# Patient Record
Sex: FEMALE | Race: WHITE | NOT HISPANIC OR LATINO | Employment: STUDENT | URBAN - METROPOLITAN AREA
[De-identification: names, ages, dates, MRNs, and addresses within clinical notes are randomized per-mention and may not be internally consistent; named-entity substitution may affect disease eponyms.]

---

## 2018-09-05 ENCOUNTER — APPOINTMENT (OUTPATIENT)
Dept: RADIOLOGY | Facility: CLINIC | Age: 23
End: 2018-09-05
Payer: COMMERCIAL

## 2018-09-05 ENCOUNTER — OFFICE VISIT (OUTPATIENT)
Dept: URGENT CARE | Facility: CLINIC | Age: 23
End: 2018-09-05
Payer: COMMERCIAL

## 2018-09-05 VITALS
DIASTOLIC BLOOD PRESSURE: 73 MMHG | SYSTOLIC BLOOD PRESSURE: 129 MMHG | TEMPERATURE: 98.2 F | RESPIRATION RATE: 18 BRPM | HEART RATE: 62 BPM | OXYGEN SATURATION: 100 %

## 2018-09-05 DIAGNOSIS — M79.641 RIGHT HAND PAIN: ICD-10-CM

## 2018-09-05 DIAGNOSIS — M79.641 RIGHT HAND PAIN: Primary | ICD-10-CM

## 2018-09-05 PROCEDURE — 99203 OFFICE O/P NEW LOW 30 MIN: CPT | Performed by: NURSE PRACTITIONER

## 2018-09-05 PROCEDURE — 73130 X-RAY EXAM OF HAND: CPT

## 2018-09-05 NOTE — PROGRESS NOTES
[facility] [facility]        NAME: Jackson Edgar is a 26 y o  female  : 1995    MRN: [id number]  DATE: 2018  TIME: 10:25 AM    Assessment and Plan   Right hand pain [M79 641]  1  Right hand pain  XR hand 3+ vw right         Patient Instructions   X-ray of right hand reveals No acute osseous abnormality  Follow RICE protocol the next 48-72 hours  Ibuprofen as directed  Follow up with PCP in 3-5 days  Proceed to  ER if symptoms worsen  Chief Complaint     Chief Complaint   Patient presents with    Hand Pain     right punched a wall on friday          History of Present Illness       Hand Pain    Incident onset: 5 days ago/ The injury mechanism was a direct blow (punched a wall  )  The pain is present in the right hand  The pain is at a severity of 3/10  She has tried NSAIDs for the symptoms  The treatment provided mild relief  Review of Systems   Review of Systems   Constitutional: Negative  HENT: Negative  Eyes: Negative  Respiratory: Negative  Cardiovascular: Negative  Gastrointestinal: Negative  Genitourinary: Negative  Musculoskeletal: Positive for arthralgias (right hand pain)  Neurological: Negative  Hematological: Negative  Psychiatric/Behavioral: Negative  Current Medications     No current outpatient prescriptions on file  Current Allergies     Allergies as of 2018    (No Known Allergies)            The following portions of the patient's history were reviewed and updated as appropriate: allergies, current medications, past family history, past medical history, past social history, past surgical history and problem list      No past medical history on file  No past surgical history on file  No family history on file  Medications have been verified          Objective   /73   Pulse 62   Temp 98 2 °F (36 8 °C)   Resp 18   SpO2 100%        Physical Exam     Physical Exam   Constitutional: She is oriented to person, place, and time  She appears well-developed and well-nourished  No distress  HENT:   Head: Normocephalic and atraumatic  Right Ear: External ear normal    Left Ear: External ear normal    Nose: Nose normal    Mouth/Throat: Oropharynx is clear and moist    Eyes: Conjunctivae and EOM are normal  Pupils are equal, round, and reactive to light  Neck: Normal range of motion  Neck supple  Cardiovascular: Normal rate, regular rhythm and normal heart sounds  Pulmonary/Chest: Effort normal and breath sounds normal    Abdominal: Soft  Bowel sounds are normal    Musculoskeletal:        Right hand: She exhibits tenderness (over 4th and 5th MCP's) and bony tenderness (4th and 5th meatatarsals  )  Neurological: She is alert and oriented to person, place, and time  She has normal reflexes  Skin: Skin is warm and dry  She is not diaphoretic  Psychiatric: She has a normal mood and affect  Her behavior is normal  Judgment and thought content normal    Nursing note and vitals reviewed  Ace bandage applied to right hand

## 2018-09-05 NOTE — PATIENT INSTRUCTIONS
Arthralgia   WHAT YOU NEED TO KNOW:   Arthralgia is pain in one or more joints, with no inflammation  It may be short-term and get better within 6 to 8 weeks  Arthralgia can be an early sign of arthritis  Arthralgia may be caused by a medical condition, such as a hormone disorder or a tumor  It may also be caused by an infection or injury  DISCHARGE INSTRUCTIONS:   Medicines: The following medicines may  be ordered for you:  · Acetaminophen  decreases pain  Ask how much to take and how often to take it  Follow directions  Acetaminophen can cause liver damage if not taken correctly  · NSAIDs  decrease pain and prevent swelling  Ask your healthcare provider which medicine is right for you  Ask how much to take and when to take it  Take as directed  NSAIDs can cause stomach bleeding and kidney problems if not taken correctly  · Pain relief cream  decreases pain  Use this cream as directed  · Take your medicine as directed  Contact your healthcare provider if you think your medicine is not helping or if you have side effects  Tell him of her if you are allergic to any medicine  Keep a list of the medicines, vitamins, and herbs you take  Include the amounts, and when and why you take them  Bring the list or the pill bottles to follow-up visits  Carry your medicine list with you in case of an emergency  Follow up with your healthcare provider or specialist as directed:  Write down your questions so you remember to ask them during your visits  Self-care:   · Apply heat  to help decrease pain  Use a heating pad or heat wrap  Apply heat for 20 to 30 minutes every 2 hours for as many days as directed  · Rest  as much as possible  Avoid activities that cause joint pain  · Apply ice  to help decrease swelling and pain  Ice may also help prevent tissue damage  Use an ice pack, or put crushed ice in a plastic bag   Cover it with a towel and place it on your painful joint for 15 to 20 minutes every hour or as directed  · Support  the joint with a brace or elastic wrap as directed  · Elevate  your joint above the level of your heart as often as you can to help decrease swelling and pain  Prop your painful joint on pillows or blankets to keep it elevated comfortably  · Lose weight  if you are overweight  Extra weight can put pressure on your joints and cause more pain  Ask your healthcare provider how much you should weigh  Ask him to help you create a weight loss plan  · Exercise  regularly to help improve joint movement and to decrease pain  Ask about the best exercise plan for you  Low-impact exercises can help take the pressure off your joints  Examples are walking, swimming, and water aerobics  Physical therapy:  A physical therapist teaches you exercises to help improve movement and strength, and to decrease pain  Ask your healthcare provider if physical therapy is right for you  Contact your healthcare provider or specialist if:   · You have a fever  · You continue to have joint pain that cannot be relieved with heat, ice, or medicine  · You have pain and inflammation around your joint  · You have questions or concerns about your condition or care  Return to the emergency department if:   · You have sudden, severe pain when you move your joint  · You have a fever and shaking chills  · You cannot move your joint  · You lose feeling on the side of your body where you have the painful joint  © 2017 [employer]  Information is for End User's use only and may not be sold, redistributed or otherwise used for commercial purposes  All illustrations and images included in CareNotes® are the copyrighted property of [employer]  or [employer]  The above information is an  only  It is not intended as medical advice for individual conditions or treatments   Talk to your doctor, nurse or pharmacist before following any medical regimen to see if it is safe and effective for you

## 2020-08-17 ENCOUNTER — TRANSCRIBE ORDERS (OUTPATIENT)
Dept: ADMINISTRATIVE | Facility: HOSPITAL | Age: 25
End: 2020-08-17

## 2020-08-17 DIAGNOSIS — M25.551 PAIN IN JOINT INVOLVING RIGHT PELVIC REGION AND THIGH: Primary | ICD-10-CM

## 2020-08-18 ENCOUNTER — HOSPITAL ENCOUNTER (OUTPATIENT)
Dept: ULTRASOUND IMAGING | Facility: HOSPITAL | Age: 25
Discharge: HOME | End: 2020-08-18
Attending: SPECIALIST
Payer: COMMERCIAL

## 2020-08-18 DIAGNOSIS — M25.551 PAIN IN JOINT INVOLVING RIGHT PELVIC REGION AND THIGH: ICD-10-CM

## 2020-08-18 PROCEDURE — 76856 US EXAM PELVIC COMPLETE: CPT

## 2020-08-18 PROCEDURE — 76830 TRANSVAGINAL US NON-OB: CPT

## 2020-10-20 ENCOUNTER — TRANSCRIBE ORDERS (OUTPATIENT)
Dept: ADMINISTRATIVE | Facility: HOSPITAL | Age: 25
End: 2020-10-20

## 2020-10-20 DIAGNOSIS — M25.551 PELVIC JOINT PAIN, RIGHT: Primary | ICD-10-CM

## 2020-10-26 ENCOUNTER — HOSPITAL ENCOUNTER (OUTPATIENT)
Dept: ULTRASOUND IMAGING | Facility: HOSPITAL | Age: 25
Discharge: HOME | End: 2020-10-26
Attending: SPECIALIST
Payer: COMMERCIAL

## 2020-10-26 DIAGNOSIS — M25.551 PELVIC JOINT PAIN, RIGHT: ICD-10-CM

## 2020-10-26 PROCEDURE — 76856 US EXAM PELVIC COMPLETE: CPT

## 2020-10-26 PROCEDURE — 76830 TRANSVAGINAL US NON-OB: CPT

## 2020-11-28 ENCOUNTER — HOSPITAL ENCOUNTER (EMERGENCY)
Facility: HOSPITAL | Age: 25
Discharge: HOME | End: 2020-11-28
Attending: INTERNAL MEDICINE | Admitting: INTERNAL MEDICINE
Payer: COMMERCIAL

## 2020-11-28 VITALS
WEIGHT: 130 LBS | DIASTOLIC BLOOD PRESSURE: 89 MMHG | TEMPERATURE: 98.8 F | SYSTOLIC BLOOD PRESSURE: 132 MMHG | OXYGEN SATURATION: 100 % | HEART RATE: 72 BPM | RESPIRATION RATE: 18 BRPM

## 2020-11-28 DIAGNOSIS — T83.32XA INTRAUTERINE CONTRACEPTIVE DEVICE THREADS LOST, INITIAL ENCOUNTER: Primary | ICD-10-CM

## 2020-11-28 LAB
EXT PREG TEST URINE: NEGATIVE
EXT. CONTROL ED NAV: NORMAL

## 2020-11-28 PROCEDURE — 99283 EMERGENCY DEPT VISIT LOW MDM: CPT

## 2020-11-28 PROCEDURE — 99282 EMERGENCY DEPT VISIT SF MDM: CPT | Performed by: PHYSICIAN ASSISTANT

## 2020-11-28 PROCEDURE — 81025 URINE PREGNANCY TEST: CPT | Performed by: PHYSICIAN ASSISTANT

## 2021-04-21 DIAGNOSIS — J02.9 SORE THROAT: Primary | ICD-10-CM

## 2021-04-21 DIAGNOSIS — R53.83 FATIGUE, UNSPECIFIED TYPE: ICD-10-CM

## 2021-04-21 DIAGNOSIS — R51.9 ACUTE NONINTRACTABLE HEADACHE, UNSPECIFIED HEADACHE TYPE: ICD-10-CM

## 2021-04-21 PROCEDURE — U0005 INFEC AGEN DETEC AMPLI PROBE: HCPCS | Performed by: PHYSICIAN ASSISTANT

## 2021-04-21 PROCEDURE — U0003 INFECTIOUS AGENT DETECTION BY NUCLEIC ACID (DNA OR RNA); SEVERE ACUTE RESPIRATORY SYNDROME CORONAVIRUS 2 (SARS-COV-2) (CORONAVIRUS DISEASE [COVID-19]), AMPLIFIED PROBE TECHNIQUE, MAKING USE OF HIGH THROUGHPUT TECHNOLOGIES AS DESCRIBED BY CMS-2020-01-R: HCPCS | Performed by: PHYSICIAN ASSISTANT

## 2021-04-22 LAB — SARS-COV-2 RNA RESP QL NAA+PROBE: NEGATIVE

## 2021-06-11 ENCOUNTER — OFFICE VISIT (OUTPATIENT)
Dept: URGENT CARE | Facility: CLINIC | Age: 26
End: 2021-06-11
Payer: COMMERCIAL

## 2021-06-11 VITALS
OXYGEN SATURATION: 100 % | HEIGHT: 64 IN | BODY MASS INDEX: 22.2 KG/M2 | HEART RATE: 60 BPM | RESPIRATION RATE: 18 BRPM | WEIGHT: 130 LBS | TEMPERATURE: 97.8 F

## 2021-06-11 DIAGNOSIS — H10.31 ACUTE CONJUNCTIVITIS OF RIGHT EYE, UNSPECIFIED ACUTE CONJUNCTIVITIS TYPE: Primary | ICD-10-CM

## 2021-06-11 PROCEDURE — 99213 OFFICE O/P EST LOW 20 MIN: CPT | Performed by: PHYSICIAN ASSISTANT

## 2021-06-11 RX ORDER — TOBRAMYCIN 3 MG/ML
1 SOLUTION/ DROPS OPHTHALMIC
Qty: 5 ML | Refills: 0 | Status: SHIPPED | OUTPATIENT
Start: 2021-06-11 | End: 2022-01-04

## 2021-06-11 NOTE — PROGRESS NOTES
[address] [facility] [employer] [facility]- [facility] pass          NAME: Shari Toledo is a 23 y o  female  : 1995    MRN: [id number]  DATE: 2021  TIME: 9:26 AM    Assessment and Plan   Acute conjunctivitis of right eye, unspecified acute conjunctivitis type [H10 31]  1  Acute conjunctivitis of right eye, unspecified acute conjunctivitis type  tobramycin (TOBREX) 0 3 % SOLN       Patient Instructions   To take drops as prescribed  · Wash your hands with soap and water often  Wash your hands before and after you touch your eyes  Also wash your hands before you prepare or eat food and after you use the bathroom or change a diaper  · Avoid allergens  Try to avoid the things that cause your allergies, such as pets, dust, or grass  · Avoid contact with others  Do not share towels or washcloths  Try to stay away from others as much as possible  Follow up with PCP in 3-5 days  To present to the ER if symptoms worsen  Chief Complaint     Chief Complaint   Patient presents with    Conjunctivitis     right eye crusted over this morning, swelling          History of Present Illness   Shari Toledo presents to the clinic c/o    Conjunctivitis   The current episode started yesterday  The onset was sudden  The problem occurs rarely  The problem has been unchanged  The problem is mild  Nothing relieves the symptoms  Nothing aggravates the symptoms  Associated symptoms include eye itching, eye discharge, eye pain and eye redness  Pertinent negatives include no fever, no decreased vision, no double vision, no photophobia, no abdominal pain, no congestion, no ear discharge, no ear pain, no headaches, no cough, no wheezing and no rash  Review of Systems   Review of Systems   Constitutional: Negative for chills, diaphoresis, fatigue and fever  HENT: Negative for congestion, ear discharge, ear pain and facial swelling  Eyes: Positive for pain, discharge, redness and itching   Negative for double vision, photophobia and visual disturbance  Respiratory: Negative for apnea, cough, chest tightness, shortness of breath and wheezing  Cardiovascular: Negative for chest pain and palpitations  Gastrointestinal: Negative for abdominal pain  Skin: Negative for color change, rash and wound  Neurological: Negative for dizziness and headaches  Hematological: Negative for adenopathy  Current Medications     No long-term medications on file  Current Allergies     Allergies as of 06/11/2021    (No Known Allergies)            The following portions of the patient's history were reviewed and updated as appropriate: allergies, current medications, past family history, past medical history, past social history, past surgical history and problem list   History reviewed  No pertinent past medical history    Past Surgical History:   Procedure Laterality Date    SINUS SURGERY      WISDOM TOOTH EXTRACTION       Social History     Socioeconomic History    Marital status: Single     Spouse name: Not on file    Number of children: Not on file    Years of education: Not on file    Highest education level: Not on file   Occupational History    Not on file   Social Needs    Financial resource strain: Not on file    Food insecurity     Worry: Not on file     Inability: Not on file    Transportation needs     Medical: Not on file     Non-medical: Not on file   Tobacco Use    Smoking status: Never Smoker    Smokeless tobacco: Never Used   Substance and Sexual Activity    Alcohol use: Yes     Frequency: 2-4 times a month    Drug use: Not Currently    Sexual activity: Not on file   Lifestyle    Physical activity     Days per week: Not on file     Minutes per session: Not on file    Stress: Not on file   Relationships    Social connections     Talks on phone: Not on file     Gets together: Not on file     Attends Hoahaoism service: Not on file     Active member of club or organization: Not on file Attends meetings of clubs or organizations: Not on file     Relationship status: Not on file    Intimate partner violence     Fear of current or ex partner: Not on file     Emotionally abused: Not on file     Physically abused: Not on file     Forced sexual activity: Not on file   Other Topics Concern    Not on file   Social History Narrative    Not on file       Objective   Pulse 60   Temp 97 8 °F (36 6 °C) (Temporal)   Resp 18   Ht 5' 4" (1 626 m)   Wt 59 kg (130 lb)   LMP 06/03/2021 (Exact Date)   SpO2 100%   BMI 22 31 kg/m²      Physical Exam     Physical Exam  Vitals signs and nursing note reviewed  Constitutional:       General: She is not in acute distress  Appearance: She is well-developed  She is not diaphoretic  HENT:      Head: Normocephalic and atraumatic  Right Ear: Tympanic membrane and external ear normal       Left Ear: Tympanic membrane and external ear normal       Nose: Nose normal    Eyes:      General: Lids are normal  Lids are everted, no foreign bodies appreciated  Vision grossly intact  No scleral icterus  Right eye: Discharge present  Left eye: No discharge  Extraocular Movements: Extraocular movements intact  Conjunctiva/sclera:      Right eye: Right conjunctiva is injected  Pupils: Pupils are equal, round, and reactive to light  Right eye: No corneal abrasion or fluorescein uptake  Left eye: No corneal abrasion or fluorescein uptake  Cardiovascular:      Rate and Rhythm: Normal rate and regular rhythm  Heart sounds: Normal heart sounds  No murmur  No friction rub  No gallop  Pulmonary:      Effort: Pulmonary effort is normal  No respiratory distress  Breath sounds: Normal breath sounds  No decreased breath sounds, wheezing, rhonchi or rales  Skin:     General: Skin is warm and dry  Coloration: Skin is not pale  Findings: No erythema or rash     Neurological:      Mental Status: She is alert and oriented to person, place, and time  Psychiatric:         Behavior: Behavior normal          Thought Content:  Thought content normal          Judgment: Judgment normal          Lilliam Villasenor PA-C

## 2021-06-11 NOTE — LETTER
June 11, 2021     Patient: Porsha Vick   YOB: 1995   Date of Visit: 6/11/2021       To Whom it May Concern: Johnnysadaf Parada is under my professional care  She was seen in my office on 6/11/2021  She may return to work on 06/12/2021  If you have any questions or concerns, please don't hesitate to call           Sincerely,          TAMMY DAY        CC: No Recipients

## 2021-06-18 ENCOUNTER — OFFICE VISIT (OUTPATIENT)
Dept: URGENT CARE | Facility: CLINIC | Age: 26
End: 2021-06-18
Payer: COMMERCIAL

## 2021-06-18 ENCOUNTER — HOSPITAL ENCOUNTER (EMERGENCY)
Facility: HOSPITAL | Age: 26
Discharge: HOME | End: 2021-06-18
Attending: INTERNAL MEDICINE
Payer: COMMERCIAL

## 2021-06-18 VITALS
SYSTOLIC BLOOD PRESSURE: 128 MMHG | RESPIRATION RATE: 16 BRPM | HEIGHT: 64 IN | OXYGEN SATURATION: 99 % | WEIGHT: 130 LBS | DIASTOLIC BLOOD PRESSURE: 87 MMHG | BODY MASS INDEX: 22.2 KG/M2 | HEART RATE: 58 BPM

## 2021-06-18 VITALS
TEMPERATURE: 99.1 F | DIASTOLIC BLOOD PRESSURE: 70 MMHG | OXYGEN SATURATION: 99 % | SYSTOLIC BLOOD PRESSURE: 113 MMHG | HEART RATE: 71 BPM | RESPIRATION RATE: 18 BRPM

## 2021-06-18 DIAGNOSIS — R53.83 OTHER FATIGUE: ICD-10-CM

## 2021-06-18 DIAGNOSIS — G43.909 MIGRAINE WITHOUT STATUS MIGRAINOSUS, NOT INTRACTABLE, UNSPECIFIED MIGRAINE TYPE: Primary | ICD-10-CM

## 2021-06-18 DIAGNOSIS — R51.9 ACUTE INTRACTABLE HEADACHE, UNSPECIFIED HEADACHE TYPE: Primary | ICD-10-CM

## 2021-06-18 LAB
ALBUMIN SERPL BCP-MCNC: 4.3 G/DL (ref 3.5–5.7)
ALP SERPL-CCNC: 49 U/L (ref 40–150)
ALT SERPL W P-5'-P-CCNC: 10 U/L (ref 7–52)
ANION GAP SERPL CALCULATED.3IONS-SCNC: 7 MMOL/L (ref 4–13)
APTT PPP: 29 SECONDS (ref 23–37)
AST SERPL W P-5'-P-CCNC: 13 U/L (ref 13–39)
BASOPHILS # BLD AUTO: 0 THOUSANDS/ΜL (ref 0–0.1)
BASOPHILS NFR BLD AUTO: 1 % (ref 0–2)
BILIRUB SERPL-MCNC: 0.4 MG/DL (ref 0.2–1)
BUN SERPL-MCNC: 12 MG/DL (ref 7–25)
CALCIUM SERPL-MCNC: 8.7 MG/DL (ref 8.6–10.5)
CHLORIDE SERPL-SCNC: 104 MMOL/L (ref 98–107)
CO2 SERPL-SCNC: 28 MMOL/L (ref 21–31)
CREAT SERPL-MCNC: 0.94 MG/DL (ref 0.6–1.2)
EOSINOPHIL # BLD AUTO: 0.1 THOUSAND/ΜL (ref 0–0.61)
EOSINOPHIL NFR BLD AUTO: 2 % (ref 0–5)
ERYTHROCYTE [DISTWIDTH] IN BLOOD BY AUTOMATED COUNT: 12.3 % (ref 11.5–14.5)
GFR SERPL CREATININE-BSD FRML MDRD: 85 ML/MIN/1.73SQ M
GLUCOSE SERPL-MCNC: 99 MG/DL (ref 65–99)
HCT VFR BLD AUTO: 38.6 % (ref 42–47)
HGB BLD-MCNC: 13 G/DL (ref 12–16)
INR PPP: 1.1 (ref 0.84–1.19)
LYMPHOCYTES # BLD AUTO: 2.1 THOUSANDS/ΜL (ref 0.6–4.47)
LYMPHOCYTES NFR BLD AUTO: 40 % (ref 21–51)
MCH RBC QN AUTO: 30.2 PG (ref 26–34)
MCHC RBC AUTO-ENTMCNC: 33.6 G/DL (ref 31–37)
MCV RBC AUTO: 90 FL (ref 81–99)
MONOCYTES # BLD AUTO: 0.4 THOUSAND/ΜL (ref 0.17–1.22)
MONOCYTES NFR BLD AUTO: 7 % (ref 2–12)
NEUTROPHILS # BLD AUTO: 2.6 THOUSANDS/ΜL (ref 1.4–6.5)
NEUTS SEG NFR BLD AUTO: 50 % (ref 42–75)
PLATELET # BLD AUTO: 253 THOUSANDS/UL (ref 149–390)
PMV BLD AUTO: 7.7 FL (ref 8.6–11.7)
POTASSIUM SERPL-SCNC: 4.1 MMOL/L (ref 3.5–5.5)
PROT SERPL-MCNC: 6.6 G/DL (ref 6.4–8.9)
PROTHROMBIN TIME: 14.1 SECONDS (ref 11.6–14.5)
RBC # BLD AUTO: 4.3 MILLION/UL (ref 3.9–5.2)
SODIUM SERPL-SCNC: 139 MMOL/L (ref 134–143)
WBC # BLD AUTO: 5.2 THOUSAND/UL (ref 4.8–10.8)

## 2021-06-18 PROCEDURE — 96375 TX/PRO/DX INJ NEW DRUG ADDON: CPT

## 2021-06-18 PROCEDURE — 85025 COMPLETE CBC W/AUTO DIFF WBC: CPT | Performed by: INTERNAL MEDICINE

## 2021-06-18 PROCEDURE — 96376 TX/PRO/DX INJ SAME DRUG ADON: CPT

## 2021-06-18 PROCEDURE — 96374 THER/PROPH/DIAG INJ IV PUSH: CPT

## 2021-06-18 PROCEDURE — 80053 COMPREHEN METABOLIC PANEL: CPT | Performed by: INTERNAL MEDICINE

## 2021-06-18 PROCEDURE — 99283 EMERGENCY DEPT VISIT LOW MDM: CPT

## 2021-06-18 PROCEDURE — 36415 COLL VENOUS BLD VENIPUNCTURE: CPT | Performed by: INTERNAL MEDICINE

## 2021-06-18 PROCEDURE — 85730 THROMBOPLASTIN TIME PARTIAL: CPT | Performed by: INTERNAL MEDICINE

## 2021-06-18 PROCEDURE — 96361 HYDRATE IV INFUSION ADD-ON: CPT

## 2021-06-18 PROCEDURE — 99282 EMERGENCY DEPT VISIT SF MDM: CPT | Performed by: INTERNAL MEDICINE

## 2021-06-18 PROCEDURE — 99215 OFFICE O/P EST HI 40 MIN: CPT | Performed by: NURSE PRACTITIONER

## 2021-06-18 PROCEDURE — 85610 PROTHROMBIN TIME: CPT | Performed by: INTERNAL MEDICINE

## 2021-06-18 RX ORDER — KETOROLAC TROMETHAMINE 30 MG/ML
15 INJECTION, SOLUTION INTRAMUSCULAR; INTRAVENOUS ONCE
Status: COMPLETED | OUTPATIENT
Start: 2021-06-18 | End: 2021-06-18

## 2021-06-18 RX ORDER — SODIUM CHLORIDE 9 MG/ML
1000 INJECTION, SOLUTION INTRAVENOUS CONTINUOUS
Status: DISCONTINUED | OUTPATIENT
Start: 2021-06-18 | End: 2021-06-18 | Stop reason: HOSPADM

## 2021-06-18 RX ORDER — METOCLOPRAMIDE HYDROCHLORIDE 5 MG/ML
10 INJECTION INTRAMUSCULAR; INTRAVENOUS ONCE
Status: COMPLETED | OUTPATIENT
Start: 2021-06-18 | End: 2021-06-18

## 2021-06-18 RX ORDER — DIPHENHYDRAMINE HYDROCHLORIDE 50 MG/ML
25 INJECTION INTRAMUSCULAR; INTRAVENOUS ONCE
Status: COMPLETED | OUTPATIENT
Start: 2021-06-18 | End: 2021-06-18

## 2021-06-18 RX ADMIN — DIPHENHYDRAMINE HYDROCHLORIDE 25 MG: 50 INJECTION INTRAMUSCULAR; INTRAVENOUS at 15:44

## 2021-06-18 RX ADMIN — METOCLOPRAMIDE 10 MG: 5 INJECTION, SOLUTION INTRAMUSCULAR; INTRAVENOUS at 15:45

## 2021-06-18 RX ADMIN — KETOROLAC TROMETHAMINE 15 MG: 30 INJECTION, SOLUTION INTRAMUSCULAR at 16:48

## 2021-06-18 RX ADMIN — KETOROLAC TROMETHAMINE 15 MG: 30 INJECTION, SOLUTION INTRAMUSCULAR at 15:45

## 2021-06-18 RX ADMIN — SODIUM CHLORIDE 1000 ML/HR: 0.9 INJECTION, SOLUTION INTRAVENOUS at 15:48

## 2021-06-18 NOTE — PATIENT INSTRUCTIONS
It is recommended that you go to the ED for evaluation of intractable headache  You have a ride to the ED   Follow up with your PCP after ED visit

## 2021-06-18 NOTE — ED PROVIDER NOTES
History  Chief Complaint   Patient presents with    Migraine     for several days unrelieved by medications  sent to ED from the care now     43-year-old female accompanied by boyfriend presents emergency room with 2-3 day history of headache  Patient states she has a history of migraines x2 her last 1 being about 5 years ago in [place] where she was hospitalized for the migraine  Patient states about a week ago she developed upper respiratory symptoms pinkeye which seemed to resolve however as of Wednesday she started getting headache which appeared like her previous migraines for sensitive nausea no vomiting     She denies any trauma loss of consciousness, facial droop slurred speech ambulatory difficulty hemiparesis  She denies fever cough  The patient is sitting in a dark room with sunglasses on  Patient states she has taken ibuprofen as well as Tylenol without any relief  Patient's last menstrual cycle ended on June 13th 2021 and it was a normal cycle  Prior to Admission Medications   Prescriptions Last Dose Informant Patient Reported? Taking?   tobramycin (TOBREX) 0 3 % SOLN   No No   Sig: Administer 1 drop to the right eye every 4 (four) hours while awake      Facility-Administered Medications: None       History reviewed  No pertinent past medical history  Past Surgical History:   Procedure Laterality Date    SINUS SURGERY      WISDOM TOOTH EXTRACTION         History reviewed  No pertinent family history  I have reviewed and agree with the history as documented  E-Cigarette/Vaping    E-Cigarette Use Never User      E-Cigarette/Vaping Substances     Social History     Tobacco Use    Smoking status: Never Smoker    Smokeless tobacco: Never Used   Vaping Use    Vaping Use: Never used   Substance Use Topics    Alcohol use: Yes    Drug use: Not Currently       Review of Systems   Constitutional: Positive for fatigue  HENT: Negative      Eyes:        Patient has sensitivity to light   Respiratory: Negative  Cardiovascular: Negative  Gastrointestinal: Negative  Genitourinary: Negative  Skin: Negative  Neurological: Negative  Hematological: Negative  Psychiatric/Behavioral: Negative  Physical Exam  Physical Exam  Vitals and nursing note reviewed  Exam conducted with a chaperone present  Constitutional:       General: She is not in acute distress  Appearance: Normal appearance  She is normal weight  She is not ill-appearing, toxic-appearing or diaphoretic  HENT:      Head: Normocephalic and atraumatic  Right Ear: Tympanic membrane normal       Left Ear: Tympanic membrane normal       Nose: Nose normal    Eyes:      Extraocular Movements: Extraocular movements intact  Conjunctiva/sclera: Conjunctivae normal       Pupils: Pupils are equal, round, and reactive to light  Cardiovascular:      Rate and Rhythm: Normal rate and regular rhythm  Pulmonary:      Effort: Pulmonary effort is normal       Breath sounds: Normal breath sounds  Abdominal:      General: Abdomen is flat  Palpations: Abdomen is soft  Musculoskeletal:         General: Normal range of motion  Cervical back: Normal range of motion and neck supple  Skin:     General: Skin is warm and dry  Neurological:      General: No focal deficit present  Mental Status: She is alert and oriented to person, place, and time  Mental status is at baseline  Comments: Cranial nerves 2-12 were grossly intact  Upper extremity examination normal mass tone sensation DTRs are equal and symmetric  Lower extremity examination normal mass tone sensation DTRs are equal and symmetric  Psychiatric:         Mood and Affect: Mood normal          Behavior: Behavior normal          Thought Content:  Thought content normal          Judgment: Judgment normal          Vital Signs  ED Triage Vitals [06/18/21 1506]   Temp Pulse Respirations Blood Pressure SpO2   -- (!) 48 17 138/80 99 %      Temp src Heart Rate Source Patient Position - Orthostatic VS BP Location FiO2 (%)   -- Monitor Sitting Left arm --      Pain Score       8           Vitals:    06/18/21 1506   BP: 138/80   Pulse: (!) 48   Patient Position - Orthostatic VS: Sitting         Visual Acuity      ED Medications  Medications   sodium chloride 0 9 % infusion (1,000 mL/hr Intravenous New Bag 6/18/21 1548)   diphenhydrAMINE (BENADRYL) injection 25 mg (25 mg Intravenous Given 6/18/21 1544)   metoclopramide (REGLAN) injection 10 mg (10 mg Intravenous Given 6/18/21 1545)   ketorolac (TORADOL) injection 15 mg (15 mg Intravenous Given 6/18/21 1545)   ketorolac (TORADOL) injection 15 mg (15 mg Intravenous Given 6/18/21 1648)       Diagnostic Studies  Results Reviewed     Procedure Component Value Units Date/Time    Protime-INR [[id number]]  (Normal) Collected: 06/18/21 1601    Lab Status: Final result Specimen: Blood from Arm, Left Updated: 06/18/21 1617     Protime 14 1 seconds      INR 1 10    APTT [[id number]]  (Normal) Collected: 06/18/21 1601    Lab Status: Final result Specimen: Blood from Arm, Left Updated: 06/18/21 1617     PTT 29 seconds     Comprehensive metabolic panel [[id number]] Collected: 06/18/21 1536    Lab Status: Final result Specimen: Blood from Arm, Left Updated: 06/18/21 1600     Sodium 139 mmol/L      Potassium 4 1 mmol/L      Chloride 104 mmol/L      CO2 28 mmol/L      ANION GAP 7 mmol/L      BUN 12 mg/dL      Creatinine 0 94 mg/dL      Glucose 99 mg/dL      Calcium 8 7 mg/dL      AST 13 U/L      ALT 10 U/L      Alkaline Phosphatase 49 U/L      Total Protein 6 6 g/dL      Albumin 4 3 g/dL      Total Bilirubin 0 40 mg/dL      eGFR 85 ml/min/1 73sq m     Narrative:      Urvashi guidelines for Chronic Kidney Disease (CKD):     Stage 1 with normal or high GFR (GFR > 90 mL/min/1 73 square meters)    Stage 2 Mild CKD (GFR = 60-89 mL/min/1 73 square meters)    Stage 3A Moderate CKD (GFR = 45-59 mL/min/1 73 square meters)    Stage 3B Moderate CKD (GFR = 30-44 mL/min/1 73 square meters)    Stage 4 Severe CKD (GFR = 15-29 mL/min/1 73 square meters)    Stage 5 End Stage CKD (GFR <15 mL/min/1 73 square meters)  Note: GFR calculation is accurate only with a steady state creatinine    CBC and differential [[id number]]  (Abnormal) Collected: 06/18/21 1536    Lab Status: Final result Specimen: Blood from Arm, Left Updated: 06/18/21 1544     WBC 5 20 Thousand/uL      RBC 4 30 Million/uL      Hemoglobin 13 0 g/dL      Hematocrit 38 6 %      MCV 90 fL      MCH 30 2 pg      MCHC 33 6 g/dL      RDW 12 3 %      MPV 7 7 fL      Platelets 112 Thousands/uL      Neutrophils Relative 50 %      Lymphocytes Relative 40 %      Monocytes Relative 7 %      Eosinophils Relative 2 %      Basophils Relative 1 %      Neutrophils Absolute 2 60 Thousands/µL      Lymphocytes Absolute 2 10 Thousands/µL      Monocytes Absolute 0 40 Thousand/µL      Eosinophils Absolute 0 10 Thousand/µL      Basophils Absolute 0 00 Thousands/µL     POCT pregnancy, urine [[id number]]     Lab Status: No result Specimen: Urine                  No orders to display              Procedures  Procedures         ED Course                                           MDM  Number of Diagnoses or Management Options  Diagnosis management comments: Patient states her headache is about 70-80% improved at this time and feels well enough to go home  Recommend following up with her gyn or PCP  If the headaches resume her continue or become more recurrence recommend possible Neurology follow-up  This is her 1st migraine in nearly 5 years        Disposition  Final diagnoses:   Migraine without status migrainosus, not intractable, unspecified migraine type     Time reflects when diagnosis was documented in both MDM as applicable and the Disposition within this note     Time User Action Codes Description Comment    6/18/2021  5:01 PM Lui Lao [Z21 171] Migraine without status migrainosus, not intractable, unspecified migraine type       ED Disposition     ED Disposition Condition Date/Time Comment    Discharge Stable Fri Jun 18, 2021  5:01 PM Carmen Reid discharge to home/self care  Follow-up Information    None         Patient's Medications   Discharge Prescriptions    No medications on file     No discharge procedures on file      PDMP Review     None          ED Provider  Electronically Signed by           Solomon Stapleton MD  06/18/21 7692

## 2021-06-18 NOTE — DISCHARGE INSTRUCTIONS
Follow-up with her PCP or gyn  Headaches become more prevalent or recurrent the patient should probably be seen by Neurology

## 2021-06-18 NOTE — PROGRESS NOTES
[address]        NAME: Nette Ponce is a 23 y o  female  : 1995    MRN: [id number]  DATE: 2021  TIME: 2:56 PM    Assessment and Plan   Acute intractable headache, unspecified headache type [R51 9]  1  Acute intractable headache, unspecified headache type  Transfer to other facility   2  Other fatigue  Transfer to other facility         Patient Instructions       Follow up with PCP in 3-5 days  Proceed to  ER if symptoms worsen  It is recommended that you go to the ED for evaluation of intractable headache  You have a ride to the ED   Follow up with your PCP after ED visit  Chief Complaint     Chief Complaint   Patient presents with    Migraine     Pt c/o a migraine for three days  History of Present Illness       This is a 23year old female who states hasn't been feeling well x 1 week  She states she was seen here 1 week ago with pink eye and treated  She states over the last 3 days she has had a migraine and it is in both temples and feels like a knife going through it  She states that she has taken ibuprofen and tylenol w/o relief  She states that she is nauseated but no vomiting  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  She denies fevers  She states she had covid in 2020  She did not drive here  Migraine   Associated symptoms include nausea  Review of Systems   Review of Systems   Constitutional: Positive for fatigue  Eyes: Negative  Respiratory: Negative  Cardiovascular: Negative  Gastrointestinal: Positive for nausea  Endocrine: Negative  Genitourinary: Negative  Musculoskeletal: Negative  Skin: Negative  Allergic/Immunologic: Negative  Neurological: Positive for headaches  Hematological: Negative  Psychiatric/Behavioral: Negative            Current Medications       Current Outpatient Medications:     tobramycin (TOBREX) 0 3 % SOLN, Administer 1 drop to the right eye every 4 (four) hours while awake, Disp: 5 mL, Rfl: 0    Current Allergies     Allergies as of 06/18/2021    (No Known Allergies)            The following portions of the patient's history were reviewed and updated as appropriate: allergies, current medications, past family history, past medical history, past social history, past surgical history and problem list      History reviewed  No pertinent past medical history  Past Surgical History:   Procedure Laterality Date    SINUS SURGERY      WISDOM TOOTH EXTRACTION         History reviewed  No pertinent family history  Medications have been verified  Objective   /70   Pulse 71   Temp 99 1 °F (37 3 °C)   Resp 18   LMP 06/03/2021 (Exact Date)   SpO2 99%   Patient's last menstrual period was 06/03/2021 (exact date)  Physical Exam     Physical Exam  Vitals and nursing note reviewed  Constitutional:       General: She is not in acute distress  Appearance: Normal appearance  She is normal weight  She is ill-appearing  She is not toxic-appearing or diaphoretic  Comments: Dressed in long sleeve shirt, has a ball cap on, alves over the head with her glasses and sun glasses on , appears to not feel well, slow moving  Not toxic appearing  VSS    HENT:      Head: Normocephalic and atraumatic  Right Ear: Tympanic membrane and ear canal normal       Left Ear: Tympanic membrane and ear canal normal       Nose: Nose normal       Mouth/Throat:      Mouth: Mucous membranes are moist    Eyes:      General: No scleral icterus  Right eye: No discharge  Left eye: No discharge  Extraocular Movements: Extraocular movements intact  Conjunctiva/sclera: Conjunctivae normal       Pupils: Pupils are equal, round, and reactive to light  Comments: Left ptosis    Cardiovascular:      Rate and Rhythm: Normal rate and regular rhythm  Pulses: Normal pulses  Heart sounds: Normal heart sounds     Pulmonary: Effort: Pulmonary effort is normal       Breath sounds: Normal breath sounds  Abdominal:      General: Abdomen is flat  Palpations: Abdomen is soft  Musculoskeletal:         General: Normal range of motion  Cervical back: Normal range of motion and neck supple  Skin:     General: Skin is warm and dry  Capillary Refill: Capillary refill takes less than 2 seconds  Neurological:      General: No focal deficit present  Mental Status: She is alert and oriented to person, place, and time  Psychiatric:         Mood and Affect: Mood normal          Behavior: Behavior normal          Thought Content: Thought content normal          Judgment: Judgment normal            Sent to ED for further evaluation - may benefit from migraine cocktail, CT head due to pain and recent pink eye to rule out sinusitis or abnormal CT  Concern of ? Ptosis    - pt denies knowledge of normalcy

## 2022-01-03 ENCOUNTER — OFFICE VISIT (OUTPATIENT)
Dept: URGENT CARE | Facility: CLINIC | Age: 27
End: 2022-01-03
Payer: COMMERCIAL

## 2022-01-03 VITALS
OXYGEN SATURATION: 100 % | HEART RATE: 52 BPM | WEIGHT: 130 LBS | RESPIRATION RATE: 14 BRPM | DIASTOLIC BLOOD PRESSURE: 70 MMHG | SYSTOLIC BLOOD PRESSURE: 140 MMHG | TEMPERATURE: 97.4 F | BODY MASS INDEX: 22.31 KG/M2

## 2022-01-03 DIAGNOSIS — R53.83 OTHER FATIGUE: Primary | ICD-10-CM

## 2022-01-03 PROCEDURE — 99213 OFFICE O/P EST LOW 20 MIN: CPT

## 2022-01-03 NOTE — PATIENT INSTRUCTIONS
Add magnesium po daily  Schedule appointment with PCP for follow up and routine blood work  Drink plenty of fluids and rest       Fatigue, Ambulatory Care   GENERAL INFORMATION:   Fatigue  is mental and physical exhaustion that does not get better with rest  It may interfere with your daily activities and can cause extreme sleepiness  Although it is normal to feel tired sometimes, long-term fatigue may be a sign of serious illness  Seek immediate care for the following symptoms:   · Difficulty breathing    · Chest pain  Management and treatment for fatigue includes the following:   · Keep a fatigue diary  to help you find out what makes you feel more or less tired  · Exercise as directed  Ask your healthcare provider about the best exercise plan for you  Even moderate exercise may decrease your fatigue  · Keep a regular schedule  Go to bed at the same time every night and limit naps  · Eat healthy foods  including fruits, vegetables, whole-grain breads, low-fat dairy products, beans, lean meats, and fish  Ask if you need to be on a special diet  · Limit caffeine and alcohol  because they can interfere with your sleep  Women should limit alcohol to 1 drink a day  Men should limit alcohol to 2 drinks a day  A drink of alcohol is 12 ounces of beer, 5 ounces of wine, or 1½ ounces of liquor  Ask your healthcare provider how much caffeine is safe for you  · Do not smoke  Smoking can cause health problems that make you tired  If you smoke, it is never too late to quit  Ask your healthcare provider for information if you need help quitting  Follow up with your healthcare provider as directed:  Write down your questions so you remember to ask them during your visits  CARE AGREEMENT:   You have the right to help plan your care  Learn about your health condition and how it may be treated  Discuss treatment options with your caregivers to decide what care you want to receive   You always have the right to refuse treatment  The above information is an  only  It is not intended as medical advice for individual conditions or treatments  Talk to your doctor, nurse or pharmacist before following any medical regimen to see if it is safe and effective for you  © 2014 [address] is for End User's use only and may not be sold, redistributed or otherwise used for commercial purposes  All illustrations and images included in CareNotes® are the copyrighted property of A D [employer] M [employer]  or [employer]

## 2022-01-03 NOTE — LETTER
January 3, 2022     Patient: Jasmeet Moore   YOB: 1995   Date of Visit: 1/3/2022       To Whom It May Concern: It is my medical opinion that Chad Ragsdale may return to work on 1/4/2022  If you have any questions or concerns, please don't hesitate to call           Sincerely,        TAMMY DAY    CC: No Recipients

## 2022-01-03 NOTE — PROGRESS NOTES
[address]        NAME: Xander Francisco is a 23 y o  female  : 1995    MRN: [id number]  DATE: January 3, 2022  TIME: 3:10 PM    Assessment and Plan   Other fatigue [R53 83]  1  Other fatigue  Ambulatory referral to [facility]       Referral placed to primary care for follow-up  Patient Instructions     Discussed with patient taking magnesium over-the-counter as directed on the bottles for headaches  Will need follow-up with PCP for further workup    Follow up with PCP in 3-5 days  Proceed to  ER if symptoms worsen  Chief Complaint     Chief Complaint   Patient presents with    Headache     unusual fatigue, headache not relieved by meds  Very sleepy, dizziness and weakness  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  Covid x2 and flu vaccine taken  Taking tylenol and ibuprofen  Suspects mono or anemia    Fatigue         History of Present Illness       Patient is a 81-year-old female who presents to the office today with complaints of fatigue and headache for the past 2 weeks  She states that she has a history of anemia and would like her blood levels checked  She just started a new insurance at the beginning of the month and currently does not have a primary care physician  She denies any other symptoms  She states that she had her menses on the  which was her typical 5 days with the heaviest day being day 3  She states that she goes through about 6 pads in a day  Denies any blood in her stool or vomit  States that she does take an iron pill daily  Denies any recent illness or exposure to COVID  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 but is declining COVID testing at this time  Review of Systems   Review of Systems   Constitutional: Positive for fatigue  Negative for fever  Respiratory: Negative for shortness of breath and wheezing  Cardiovascular: Negative for chest pain and palpitations     Gastrointestinal: Negative for diarrhea, nausea and vomiting  Neurological: Positive for headaches  Hematological: Bruises/bleeds easily  All other systems reviewed and are negative  LMP: 12/29/2021    Current Medications       Current Outpatient Medications:     Cyanocobalamin 5000 MCG SUBL, Place under the tongue, Disp: , Rfl:     Fe Bisgly-Vit C-Vit B12-FA 28-60-0 008-0 4 MG CAPS, Take by mouth, Disp: , Rfl:     VITAMIN D PO, Take by mouth, Disp: , Rfl:     tobramycin (TOBREX) 0 3 % SOLN, Administer 1 drop to the right eye every 4 (four) hours while awake, Disp: 5 mL, Rfl: 0    Current Allergies     Allergies as of 01/03/2022    (No Known Allergies)            The following portions of the patient's history were reviewed and updated as appropriate: allergies, current medications, past family history, past medical history, past social history, past surgical history and problem list      Past Medical History:   Diagnosis Date    Anemia     Mononucleosis        Past Surgical History:   Procedure Laterality Date    SINUS SURGERY      WISDOM TOOTH EXTRACTION         History reviewed  No pertinent family history  Medications have been verified  Objective   /70   Pulse (!) 52   Temp (!) 97 4 °F (36 3 °C)   Resp 14   Wt 59 kg (130 lb)   LMP 12/27/2021   SpO2 100%   BMI 22 31 kg/m²        Physical Exam     Physical Exam  Vitals and nursing note reviewed  Constitutional:       General: She is not in acute distress  Appearance: Normal appearance  She is normal weight  She is not ill-appearing or toxic-appearing  HENT:      Right Ear: Tympanic membrane normal       Left Ear: Tympanic membrane normal       Nose: No congestion or rhinorrhea  Mouth/Throat:      Mouth: Mucous membranes are moist  Mucous membranes are pale  Eyes:      Comments: Pale conjunctiva bilaterally   Cardiovascular:      Rate and Rhythm: Normal rate and regular rhythm  Pulses: Normal pulses        Heart sounds: Normal heart sounds  No murmur heard  Pulmonary:      Effort: No respiratory distress  Breath sounds: No wheezing or rales  Abdominal:      Palpations: Abdomen is soft  Lymphadenopathy:      Cervical: No cervical adenopathy  Skin:     General: Skin is warm and dry  Capillary Refill: Capillary refill takes less than 2 seconds  Neurological:      General: No focal deficit present  Mental Status: She is alert and oriented to person, place, and time  GCS: GCS eye subscore is 4  GCS verbal subscore is 5  GCS motor subscore is 6  Cranial Nerves: No cranial nerve deficit  Sensory: No sensory deficit  Motor: No weakness        Coordination: Coordination normal       Gait: Gait normal       Deep Tendon Reflexes: Reflexes normal    Psychiatric:         Mood and Affect: Mood normal          Behavior: Behavior normal

## 2022-01-04 ENCOUNTER — OFFICE VISIT (OUTPATIENT)
Dept: FAMILY MEDICINE CLINIC | Facility: CLINIC | Age: 27
End: 2022-01-04
Payer: COMMERCIAL

## 2022-01-04 VITALS
DIASTOLIC BLOOD PRESSURE: 60 MMHG | OXYGEN SATURATION: 100 % | TEMPERATURE: 97.2 F | HEART RATE: 53 BPM | HEIGHT: 66 IN | SYSTOLIC BLOOD PRESSURE: 100 MMHG | BODY MASS INDEX: 21.21 KG/M2 | WEIGHT: 132 LBS

## 2022-01-04 DIAGNOSIS — Z11.59 NEED FOR HEPATITIS C SCREENING TEST: ICD-10-CM

## 2022-01-04 DIAGNOSIS — Z11.4 SCREENING FOR HIV (HUMAN IMMUNODEFICIENCY VIRUS): ICD-10-CM

## 2022-01-04 DIAGNOSIS — Z76.89 ENCOUNTER TO ESTABLISH CARE WITH NEW DOCTOR: Primary | ICD-10-CM

## 2022-01-04 DIAGNOSIS — D50.9 IRON DEFICIENCY ANEMIA, UNSPECIFIED IRON DEFICIENCY ANEMIA TYPE: ICD-10-CM

## 2022-01-04 DIAGNOSIS — Z23 NEED FOR HPV VACCINE: ICD-10-CM

## 2022-01-04 DIAGNOSIS — R53.83 FATIGUE, UNSPECIFIED TYPE: ICD-10-CM

## 2022-01-04 PROCEDURE — 3725F SCREEN DEPRESSION PERFORMED: CPT | Performed by: FAMILY MEDICINE

## 2022-01-04 PROCEDURE — 90471 IMMUNIZATION ADMIN: CPT

## 2022-01-04 PROCEDURE — 90651 9VHPV VACCINE 2/3 DOSE IM: CPT

## 2022-01-04 PROCEDURE — 99204 OFFICE O/P NEW MOD 45 MIN: CPT | Performed by: FAMILY MEDICINE

## 2022-01-04 RX ORDER — BUTALBITAL, ACETAMINOPHEN AND CAFFEINE 300; 40; 50 MG/1; MG/1; MG/1
CAPSULE ORAL
COMMUNITY
Start: 2021-10-08 | End: 2022-01-04

## 2022-01-04 NOTE — PROGRESS NOTES
Assessment/Plan:      Diagnoses and all orders for this visit:    Encounter to establish care with new doctor    Need for hepatitis C screening test  -     Hepatitis C Antibody ([facility]); Future    Screening for HIV (human immunodeficiency virus)  -     HIV 1/2 Antigen/Antibody (4th Generation) w Reflex [facility]; Future    Fatigue, unspecified type  -     CBC and differential; Future  -     Iron Panel (Includes Ferritin, Iron Sat%, Iron, and TIBC); Future  -     TSH, 3rd generation with Free T4 reflex; Future    Iron deficiency anemia, unspecified iron deficiency anemia type  -     CBC and differential; Future  -     Iron Panel (Includes Ferritin, Iron Sat%, Iron, and TIBC); Future    Need for HPV vaccine  -     HPV VACCINE 9 VALENT IM    Other orders  -     Discontinue: Butalbital-APAP-Caffeine -40 MG CAPS; take 1 to 2 capsules by mouth every 4 hours if needed for headache (Patient not taking: Reported on 3/7/3720)  -     IRON-FOLIC ACID PO; Take by mouth      Reports she had the flu shot already and thinks she has had booster for tdap so will bring records for next visit  Return in about 4 weeks (around 2/1/2022) for Annual physical with pcp  The following portions of the patient's history were reviewed and updated as appropriate: allergies, current medications, past family history, past medical history, past social history, past surgical history, and problem list      Subjective:     Patient ID: Deedee Peralta is a 23 y o  female  HPI    Deedee Peralta presents today to establish care  Patient doing well today  History was reviewed as below  Anemia: reports history of anemia  Reports was taking folic acid/iron 28 mg with vit c to help absorb  Reports was taking it regularly but has not been taking it recently  She feels severe fatigue that is similar to what she normally feels like when her iron is low  Reports headaches and muscle aches since getting the vaccine   Taking ibuprofen frequently for the last month then got abdominal pain  Thought she had a gastric ulcer so started taking over the counter omeprazole as advised by her dad who is a PA at an urgent care  She reports improvement of the abdominal pain since then  Patient reports she does not normally like to take medications for pain so she takes a lot of vitamins to help with that  ADHD: Reports diagnosis 6 years ago  Reports depression and ADHD  Reports initially on depression and adhd  reports was on meds for adhd and reports out of body experience so stopped  Was seeing psychiatrist and therapist at the time at a place called [facility]  Reports no longer seeing either  Reports therapy is something she is interested in returning to but declines referral at this time  PHQ-9 Depression Screening    Little interest or pleasure in doing things: 0 - not at all  Feeling down, depressed, or hopeless: 1 - several days          Past Medical History:   Diagnosis Date    ADHD     Anemia     Migraine     Mononucleosis        Past Surgical History:   Procedure Laterality Date    SINUS SURGERY      WISDOM TOOTH EXTRACTION         Family History   Problem Relation Age of Onset    Hypertension Mother     Hypertension Father     No Known Problems Brother     Hypertension Maternal Grandmother     Cancer Maternal Grandfather         bladder    Lupus Paternal Grandmother     Diabetes Paternal Grandmother     Alzheimer's disease Paternal Grandfather        Social History     Tobacco Use    Smoking status: Never Smoker    Smokeless tobacco: Never Used   Vaping Use    Vaping Use: Never used   Substance Use Topics    Alcohol use:  Yes     Alcohol/week: 1 0 standard drink     Types: 1 Glasses of wine per week     Comment: socially weekly    Drug use: Never      Social History     Social History Narrative    Lives in house with BF            Currently in college        Not working while in school       No Known Allergies    Current Outpatient Medications on File Prior to Visit   Medication Sig Dispense Refill    Cyanocobalamin 5000 MCG SUBL Place under the tongue      Fe Bisgly-Vit C-Vit B12-FA 28-60-0 008-0 4 MG CAPS Take by mouth      IRON-FOLIC ACID PO Take by mouth      VITAMIN D PO Take by mouth      [DISCONTINUED] Butalbital-APAP-Caffeine -40 MG CAPS take 1 to 2 capsules by mouth every 4 hours if needed for headache (Patient not taking: Reported on 1/4/2022)      [DISCONTINUED] tobramycin (TOBREX) 0 3 % SOLN Administer 1 drop to the right eye every 4 (four) hours while awake (Patient not taking: Reported on 1/4/2022 ) 5 mL 0     No current facility-administered medications on file prior to visit  Review of Systems   Constitutional: Positive for fatigue  Negative for chills and fever  HENT: Negative for congestion, rhinorrhea and sore throat  Eyes: Negative for visual disturbance  Respiratory: Negative for cough, shortness of breath and wheezing  Cardiovascular: Negative for chest pain, palpitations and leg swelling  Gastrointestinal: Negative for abdominal pain, constipation, diarrhea, nausea and vomiting  Genitourinary: Negative for dysuria and hematuria  Musculoskeletal: Negative for arthralgias and myalgias  Skin: Negative for rash  Neurological: Negative for weakness, light-headedness and headaches  Objective:    Vitals:    01/04/22 0643   BP: 100/60   Pulse: (!) 53   Temp: (!) 97 2 °F (36 2 °C)   SpO2: 100%   Weight: 59 9 kg (132 lb)   Height: 5' 5 75" (1 67 m)         Physical Exam  Vitals and nursing note reviewed  Constitutional:       General: She is not in acute distress  Appearance: Normal appearance  She is not ill-appearing or toxic-appearing  HENT:      Head: Normocephalic and atraumatic  Right Ear: External ear normal       Left Ear: External ear normal    Eyes:      General: No scleral icterus  Right eye: No discharge           Left eye: No discharge  Extraocular Movements: Extraocular movements intact  Conjunctiva/sclera: Conjunctivae normal    Cardiovascular:      Rate and Rhythm: Regular rhythm  Bradycardia present  Heart sounds: Normal heart sounds  No murmur heard  No friction rub  No gallop  Pulmonary:      Effort: Pulmonary effort is normal  No respiratory distress  Breath sounds: Normal breath sounds  No wheezing or rales  Neurological:      Mental Status: She is alert

## 2022-01-21 ENCOUNTER — OFFICE VISIT (OUTPATIENT)
Dept: FAMILY MEDICINE CLINIC | Facility: CLINIC | Age: 27
End: 2022-01-21
Payer: COMMERCIAL

## 2022-01-21 VITALS
SYSTOLIC BLOOD PRESSURE: 114 MMHG | WEIGHT: 132 LBS | HEIGHT: 66 IN | HEART RATE: 63 BPM | DIASTOLIC BLOOD PRESSURE: 70 MMHG | BODY MASS INDEX: 21.21 KG/M2 | TEMPERATURE: 97.3 F | OXYGEN SATURATION: 100 %

## 2022-01-21 DIAGNOSIS — Z11.1 SCREENING FOR TUBERCULOSIS: ICD-10-CM

## 2022-01-21 DIAGNOSIS — R59.0 LYMPHADENOPATHY OF RIGHT CERVICAL REGION: Primary | ICD-10-CM

## 2022-01-21 PROCEDURE — 3008F BODY MASS INDEX DOCD: CPT | Performed by: FAMILY MEDICINE

## 2022-01-21 PROCEDURE — 99214 OFFICE O/P EST MOD 30 MIN: CPT | Performed by: FAMILY MEDICINE

## 2022-01-21 PROCEDURE — 1036F TOBACCO NON-USER: CPT | Performed by: FAMILY MEDICINE

## 2022-01-21 PROCEDURE — 86580 TB INTRADERMAL TEST: CPT

## 2022-01-21 NOTE — PROGRESS NOTES
Assessment/Plan:      Diagnoses and all orders for this visit:    Lymphadenopathy of right cervical region  Comments:  likely reactive but no definite source  closely monitor one f/u to r/o malignancy  pt advised of importance and verbalized undestanding  Screening for tuberculosis  -     TB Skin Test    Other orders  -     Ginkgo Biloba (GINKOBA PO); Take by mouth          No follow-ups on file  The following portions of the patient's history were reviewed and updated as appropriate: allergies, current medications, past family history, past medical history, past social history, past surgical history, and problem list      Subjective:     Patient ID: Halle Anderson is a 23 y o  female  HPI    Right side of neck has had lymph nodes  She was here today for ppd read but wanted someone to take a look at her neck  Blood work was reviewed as well  lumps for 10 days  Started on the top of the right side of the neck but now it feels like there is one on the right side of the face and under the jaw  Reports it causes pain because it was pushing on the face which has since stopped  Tender to the touch  Reports the jaw one decreased since it was swollen, the neck has gotten bigger, and the ear has stayed the same  Reports she feels better than the last visit so she thinks that it might be from that  Denies any dental pain  No cough, congestion, runny nose, diarrhea, abdominal pain, fevers, chills, night sweats, lightheadedness, dizziness, ear pain  Reports fatigue but that has improved  Reports feels well enough to work out on [employer]  No sick contacts  No mono, no strep       PHQ-9 Depression Screening            Current Outpatient Medications on File Prior to Visit   Medication Sig Dispense Refill    Cyanocobalamin 5000 MCG SUBL Place under the tongue      Fe Bisgly-Vit C-Vit B12-FA 28-60-0 008-0 4 MG CAPS Take by mouth      Ginkgo Biloba (GINKOBA PO) Take by mouth      IRON-FOLIC ACID PO Take by mouth  VITAMIN D PO Take by mouth       No current facility-administered medications on file prior to visit  Review of Systems      Objective:    Vitals:    01/21/22 1055   BP: 114/70   BP Location: Left arm   Patient Position: Sitting   Cuff Size: Standard   Pulse: 63   Temp: (!) 97 3 °F (36 3 °C)   TempSrc: Tympanic   SpO2: 100%   Weight: 59 9 kg (132 lb)   Height: 5' 5 75" (1 67 m)         Physical Exam  Vitals and nursing note reviewed  Constitutional:       General: She is not in acute distress  Appearance: Normal appearance  She is not ill-appearing or toxic-appearing  HENT:      Head: Normocephalic and atraumatic  Right Ear: Hearing, ear canal and external ear normal  A middle ear effusion is present  Left Ear: Hearing, ear canal and external ear normal  A middle ear effusion is present  Nose:      Right Sinus: No maxillary sinus tenderness or frontal sinus tenderness  Left Sinus: No maxillary sinus tenderness or frontal sinus tenderness  Mouth/Throat:      Mouth: Mucous membranes are moist       Pharynx: Oropharynx is clear  No oropharyngeal exudate  Eyes:      General: No scleral icterus  Right eye: No discharge  Left eye: No discharge  Extraocular Movements: Extraocular movements intact  Conjunctiva/sclera: Conjunctivae normal       Pupils: Pupils are equal, round, and reactive to light  Cardiovascular:      Rate and Rhythm: Normal rate and regular rhythm  Heart sounds: Normal heart sounds  No murmur heard  No friction rub  No gallop  Pulmonary:      Effort: Pulmonary effort is normal  No respiratory distress  Breath sounds: Normal breath sounds  No wheezing or rales  Chest:   Breasts:      Right: No axillary adenopathy or supraclavicular adenopathy  Left: No axillary adenopathy or supraclavicular adenopathy  Lymphadenopathy:      Head:      Right side of head: Submandibular adenopathy present   No submental, tonsillar, preauricular, posterior auricular or occipital adenopathy  Left side of head: No submental, submandibular, tonsillar, preauricular, posterior auricular or occipital adenopathy  Cervical: Cervical adenopathy present  Right cervical: Superficial cervical adenopathy present  No deep cervical adenopathy  Left cervical: No superficial or deep cervical adenopathy  Upper Body:      Right upper body: No supraclavicular, axillary or epitrochlear adenopathy  Left upper body: No supraclavicular, axillary or epitrochlear adenopathy  Lower Body: No right inguinal adenopathy  No left inguinal adenopathy  Comments: 2 lymph nodes one right cervical chain and one submandibular  Cervical larger about 1 5 cm  Both TTP  No overlying skin changes   Neurological:      Mental Status: She is alert

## 2022-01-24 LAB
INDURATION: 0 MM
TB SKIN TEST: NEGATIVE

## 2022-02-24 ENCOUNTER — OFFICE VISIT (OUTPATIENT)
Dept: FAMILY MEDICINE CLINIC | Facility: CLINIC | Age: 27
End: 2022-02-24
Payer: COMMERCIAL

## 2022-02-24 VITALS
DIASTOLIC BLOOD PRESSURE: 70 MMHG | HEART RATE: 57 BPM | TEMPERATURE: 98.9 F | WEIGHT: 131.25 LBS | HEIGHT: 66 IN | OXYGEN SATURATION: 100 % | BODY MASS INDEX: 21.09 KG/M2 | SYSTOLIC BLOOD PRESSURE: 112 MMHG

## 2022-02-24 DIAGNOSIS — Z00.00 ANNUAL PHYSICAL EXAM: Primary | ICD-10-CM

## 2022-02-24 DIAGNOSIS — Z23 ENCOUNTER FOR IMMUNIZATION: ICD-10-CM

## 2022-02-24 DIAGNOSIS — R53.83 FATIGUE, UNSPECIFIED TYPE: ICD-10-CM

## 2022-02-24 DIAGNOSIS — D50.9 IRON DEFICIENCY ANEMIA, UNSPECIFIED IRON DEFICIENCY ANEMIA TYPE: ICD-10-CM

## 2022-02-24 PROCEDURE — 90471 IMMUNIZATION ADMIN: CPT

## 2022-02-24 PROCEDURE — 1036F TOBACCO NON-USER: CPT | Performed by: FAMILY MEDICINE

## 2022-02-24 PROCEDURE — 3008F BODY MASS INDEX DOCD: CPT | Performed by: FAMILY MEDICINE

## 2022-02-24 PROCEDURE — 3725F SCREEN DEPRESSION PERFORMED: CPT | Performed by: FAMILY MEDICINE

## 2022-02-24 PROCEDURE — 90715 TDAP VACCINE 7 YRS/> IM: CPT

## 2022-02-24 PROCEDURE — 99395 PREV VISIT EST AGE 18-39: CPT | Performed by: FAMILY MEDICINE

## 2022-02-24 NOTE — PATIENT INSTRUCTIONS
Start taking ferrous sulfate either 324 mg or 65 of the elemental iron daily or twice daily on how you tolerate it  - if doing poorly, take with orange juice and limit to either all doses at one time or every other day      Wellness Visit for Adults   AMBULATORY CARE:   A wellness visit  is when you see your healthcare provider to get screened for health problems  Your healthcare provider will also give you advice on how to stay healthy  Write down your questions so you remember to ask them  Ask your healthcare provider how often you should have a wellness visit  What happens at a wellness visit:  Your healthcare provider will ask about your health, and your family history of health problems  This includes high blood pressure, heart disease, and cancer  He or she will ask if you have symptoms that concern you, if you smoke, and about your mood  You may also be asked about your intake of medicines, supplements, food, and alcohol  Any of the following may be done:  · Your weight  will be checked  Your height may also be checked so your body mass index (BMI) can be calculated  Your BMI shows if you are at a healthy weight  · Your blood pressure  and heart rate will be checked  Your temperature may also be checked  · Blood and urine tests  may be done  Blood tests may be done to check your cholesterol levels  Abnormal cholesterol levels increase your risk for heart disease and stroke  You may also need a blood or urine test to check for diabetes if you are at increased risk  Urine tests may be done to look for signs of an infection or kidney disease  · A physical exam  includes checking your heartbeat and lungs with a stethoscope  Your healthcare provider may also check your skin to look for sun damage  · Screening tests  may be recommended  A screening test is done to check for diseases that may not cause symptoms   The screening tests you may need depend on your age, gender, family history, and lifestyle habits  For example, colorectal screening may be recommended if you are 48years old or older  Screening tests you need if you are a woman:   · A Pap smear  is used to screen for cervical cancer  Pap smears are usually done every 3 to 5 years depending on your age  You may need them more often if you have had abnormal Pap smear test results in the past  Ask your healthcare provider how often you should have a Pap smear  · A mammogram  is an x-ray of your breasts to screen for breast cancer  Experts recommend mammograms every 2 years starting at age 48 years  You may need a mammogram at age 52 years or younger if you have an increased risk for breast cancer  Talk to your healthcare provider about when you should start having mammograms and how often you need them  Vaccines you may need:   · Get an influenza vaccine  every year  The influenza vaccine protects you from the flu  Several types of viruses cause the flu  The viruses change over time, so new vaccines are made each year  · Get a tetanus-diphtheria (Td) booster vaccine  every 10 years  This vaccine protects you against tetanus and diphtheria  Tetanus is a severe infection that may cause painful muscle spasms and lockjaw  Diphtheria is a severe bacterial infection that causes a thick covering in the back of your mouth and throat  · Get a human papillomavirus (HPV) vaccine  if you are female and aged 23 to 32 or male 23 to 24 and never received it  This vaccine protects you from HPV infection  HPV is the most common infection spread by sexual contact  HPV may also cause vaginal, penile, and anal cancers  · Get a pneumococcal vaccine  if you are aged 72 years or older  The pneumococcal vaccine is an injection given to protect you from pneumococcal disease  Pneumococcal disease is an infection caused by pneumococcal bacteria  The infection may cause pneumonia, meningitis, or an ear infection      · Get a shingles vaccine  if you are 61 or older, even if you have had shingles before  The shingles vaccine is an injection to protect you from the varicella-zoster virus  This is the same virus that causes chickenpox  Shingles is a painful rash that develops in people who had chickenpox or have been exposed to the virus  How to eat healthy:  My Plate is a model for planning healthy meals  It shows the types and amounts of foods that should go on your plate  Fruits and vegetables make up about half of your plate, and grains and protein make up the other half  A serving of dairy is included on the side of your plate  The amount of calories and serving sizes you need depends on your age, gender, weight, and height  Examples of healthy foods are listed below:  · Eat a variety of vegetables  such as dark green, red, and orange vegetables  You can also include canned vegetables low in sodium (salt) and frozen vegetables without added butter or sauces  · Eat a variety of fresh fruits , canned fruit in 100% juice, frozen fruit, and dried fruit  · Include whole grains  At least half of the grains you eat should be whole grains  Examples include whole-wheat bread, wheat pasta, brown rice, and whole-grain cereals such as oatmeal     · Eat a variety of protein foods such as seafood (fish and shellfish), lean meat, and poultry without skin (turkey and chicken)  Examples of lean meats include pork leg, shoulder, or tenderloin, and beef round, sirloin, tenderloin, and extra lean ground beef  Other protein foods include eggs and egg substitutes, beans, peas, soy products, nuts, and seeds  · Choose low-fat dairy products such as skim or 1% milk or low-fat yogurt, cheese, and cottage cheese  · Limit unhealthy fats  such as butter, hard margarine, and shortening  Exercise:  Exercise at least 30 minutes per day on most days of the week  Some examples of exercise include walking, biking, dancing, and swimming   You can also fit in more physical activity by taking the stairs instead of the elevator or parking farther away from stores  Include muscle strengthening activities 2 days each week  Regular exercise provides many health benefits  It helps you manage your weight, and decreases your risk for type 2 diabetes, heart disease, stroke, and high blood pressure  Exercise can also help improve your mood  Ask your healthcare provider about the best exercise plan for you  General health and safety guidelines:   · Do not smoke  Nicotine and other chemicals in cigarettes and cigars can cause lung damage  Ask your healthcare provider for information if you currently smoke and need help to quit  E-cigarettes or smokeless tobacco still contain nicotine  Talk to your healthcare provider before you use these products  · Limit alcohol  A drink of alcohol is 12 ounces of beer, 5 ounces of wine, or 1½ ounces of liquor  · Lose weight, if needed  Being overweight increases your risk of certain health conditions  These include heart disease, high blood pressure, type 2 diabetes, and certain types of cancer  · Protect your skin  Do not sunbathe or use tanning beds  Use sunscreen with a SPF 15 or higher  Apply sunscreen at least 15 minutes before you go outside  Reapply sunscreen every 2 hours  Wear protective clothing, hats, and sunglasses when you are outside  · Drive safely  Always wear your seatbelt  Make sure everyone in your car wears a seatbelt  A seatbelt can save your life if you are in an accident  Do not use your cell phone when you are driving  This could distract you and cause an accident  Pull over if you need to make a call or send a text message  · Practice safe sex  Use latex condoms if are sexually active and have more than one partner  Your healthcare provider may recommend screening tests for sexually transmitted infections (STIs)  · Wear helmets, lifejackets, and protective gear    Always wear a helmet when you ride a bike or motorcycle, go skiing, or play sports that could cause a head injury  Wear protective equipment when you play sports  Wear a lifejacket when you are on a boat or doing water sports  © Copyright [employer] 2021 Information is for End User's use only and may not be sold, redistributed or otherwise used for commercial purposes  All illustrations and images included in CareNotes® are the copyrighted property of A D A M , Inc  or [facility] Hermila Rodriguez   The above information is an  only  It is not intended as medical advice for individual conditions or treatments  Talk to your doctor, nurse or pharmacist before following any medical regimen to see if it is safe and effective for you

## 2022-02-24 NOTE — PROGRESS NOTES
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  is going to think about booster shot for covid pending how cases are going march    Relevant Orders    TDAP VACCINE GREATER THAN OR EQUAL TO 6YO IM (Completed)    Iron deficiency anemia, unspecified iron deficiency anemia type        low normal iron studies  advised on ferrous sulfate once or twice daily  once every other day if having constipation or other SE    Fatigue, unspecified type        low normal iron studies          Immunizations and preventive care screenings were discussed with patient today  Appropriate education was printed on patient's after visit summary  Counseling:  Alcohol/drug use: discussed moderation in alcohol intake, the recommendations for healthy alcohol use, and avoidance of illicit drug use  Dental Health: discussed importance of regular tooth brushing, flossing, and dental visits  Injury prevention: discussed safety/seat belts, safety helmets, smoke detectors, carbon dioxide detectors, and smoking near bedding or upholstery  Sexual health: discussed sexually transmitted diseases, partner selection, use of condoms, avoidance of unintended pregnancy, and contraceptive alternatives  · Exercise: the importance of regular exercise/physical activity was discussed  Recommend exercise 3-5 times per week for at least 30 minutes  Return in 1 year (on 2023) for Annual physical with pcp       Chief Complaint:     Chief Complaint   Patient presents with   Paige Mccoy Physical Exam      History of Present Illness:     Adult Annual Physical   Patient here for a comprehensive physical exam  The patient reports no problems  Diet and Physical Activity  · Diet/Nutrition: well balanced diet, limited junk food and consuming 3-5 servings of fruits/vegetables daily  · Exercise: 1-2 times a week on average and 30-60 minutes on average  Depression Screening  PHQ-2/9 Depression Screening    Little interest or pleasure in doing things: 0 - not at all  Feeling down, depressed, or hopeless: 0 - not at all  PHQ-2 Score: 0  PHQ-2 Interpretation: Negative depression screen       General Health  · Sleep: sleeps well and gets 7-8 hours of sleep on average  · Hearing: normal - bilateral   · Vision: no vision problems, goes for regular eye exams, wears glasses and wears contacts  · Dental: regular dental visits, brushes teeth twice daily and flosses teeth occasionally  /GYN Health  · Last menstrual period: 01/29/2022, regular  · Contraceptive method: copper IUD  · History of STDs?: yes  Treated, retested after too         Review of Systems:     Review of Systems   Constitutional: Negative for chills, fatigue and fever  HENT: Negative for ear pain and sore throat  Eyes: Negative for pain and visual disturbance  Respiratory: Negative for cough and shortness of breath  Cardiovascular: Negative for chest pain and palpitations  Gastrointestinal: Negative for abdominal pain, constipation, diarrhea, nausea and vomiting  Endocrine: Negative for polydipsia, polyphagia and polyuria  Genitourinary: Negative for dysuria and hematuria  Musculoskeletal: Negative for arthralgias and back pain  Skin: Negative for color change, pallor and rash  Neurological: Negative for dizziness, seizures, syncope, speech difficulty and weakness  All other systems reviewed and are negative       Past Medical History:     Past Medical History:   Diagnosis Date    ADHD     Anemia     Migraine     Mononucleosis       Past Surgical History:     Past Surgical History:   Procedure Laterality Date    SINUS SURGERY      WISDOM TOOTH EXTRACTION        Social History:     Social History     Socioeconomic History    Marital status: Single     Spouse name: None    Number of children: None    Years of education: None    Highest education level: None   Occupational History    None   Tobacco Use    Smoking status: Never Smoker    Smokeless tobacco: Never Used   Vaping Use    Vaping Use: Never used   Substance and Sexual Activity    Alcohol use:  Yes     Alcohol/week: 1 0 standard drink     Types: 1 Glasses of wine per week     Comment: socially weekly    Drug use: Never    Sexual activity: Yes     Partners: Male     Comment: history STD-chlamydia   Other Topics Concern    None   Social History Narrative    Lives in house with BF            Currently in college        Not working while in school     Social Determinants of Health     Financial Resource Strain: Not on file   Food Insecurity: Not on file   Transportation Needs: Not on file   Physical Activity: Not on file   Stress: Not on file   Social Connections: Not on file   Intimate Partner Violence: Not on file   Housing Stability: Not on file      Family History:     Family History   Problem Relation Age of Onset    Hypertension Mother     Hypertension Father     No Known Problems Brother     Hypertension Maternal Grandmother     Cancer Maternal Grandfather         bladder    Lupus Paternal Grandmother     Diabetes Paternal Grandmother     Alzheimer's disease Paternal Grandfather       Current Medications:     Current Outpatient Medications   Medication Sig Dispense Refill    Cyanocobalamin 5000 MCG SUBL Place under the tongue      Fe Bisgly-Vit C-Vit B12-FA 28-60-0 008-0 4 MG CAPS Take by mouth      Ginkgo Biloba (GINKOBA PO) Take by mouth      IRON-FOLIC ACID PO Take by mouth      VITAMIN D PO Take by mouth       No current facility-administered medications for this visit  Allergies:     No Known Allergies   Physical Exam:     /70 (BP Location: Left arm, Patient Position: Sitting, Cuff Size: Standard)   Pulse 57   Temp 98 9 °F (37 2 °C) (Tympanic)   Ht 5' 5 75" (1 67 m)   Wt 59 5 kg (131 lb 4 oz)   LMP 01/28/2022 (Exact Date)   SpO2 100%   BMI 21 35 kg/m²     Physical Exam  Vitals and nursing note reviewed  Constitutional:       General: She is not in acute distress  Appearance: Normal appearance  She is well-developed  She is not ill-appearing, toxic-appearing or diaphoretic  HENT:      Head: Normocephalic and atraumatic  Right Ear: Tympanic membrane, ear canal and external ear normal       Left Ear: Tympanic membrane, ear canal and external ear normal       Nose: Nose normal  No congestion or rhinorrhea  Mouth/Throat:      Mouth: Mucous membranes are moist       Pharynx: Oropharynx is clear  No oropharyngeal exudate or posterior oropharyngeal erythema  Eyes:      Extraocular Movements: Extraocular movements intact  Conjunctiva/sclera: Conjunctivae normal       Pupils: Pupils are equal, round, and reactive to light  Neck:      Vascular: No carotid bruit  Cardiovascular:      Rate and Rhythm: Normal rate and regular rhythm  Pulses: Normal pulses  Heart sounds: Normal heart sounds  No murmur heard  No friction rub  No gallop  Pulmonary:      Effort: Pulmonary effort is normal  No respiratory distress  Breath sounds: Normal breath sounds  No wheezing or rales  Abdominal:      General: Bowel sounds are normal       Palpations: Abdomen is soft  Tenderness: There is no abdominal tenderness  There is no guarding  Musculoskeletal:         General: Normal range of motion  Cervical back: Neck supple  No muscular tenderness  Right lower leg: No edema  Left lower leg: No edema  Lymphadenopathy:      Cervical: No cervical adenopathy     Skin:     General: Skin is warm and dry  Findings: No lesion  Neurological:      General: No focal deficit present  Mental Status: She is alert  Cranial Nerves: No cranial nerve deficit  Sensory: No sensory deficit  Motor: No weakness            Nel Ferreira MD   [id number]

## 2022-03-21 ENCOUNTER — OFFICE VISIT (OUTPATIENT)
Dept: URGENT CARE | Facility: CLINIC | Age: 27
End: 2022-03-21
Payer: COMMERCIAL

## 2022-03-21 VITALS
OXYGEN SATURATION: 98 % | HEART RATE: 55 BPM | TEMPERATURE: 97.6 F | SYSTOLIC BLOOD PRESSURE: 118 MMHG | RESPIRATION RATE: 16 BRPM | BODY MASS INDEX: 21.66 KG/M2 | HEIGHT: 65 IN | DIASTOLIC BLOOD PRESSURE: 72 MMHG | WEIGHT: 130 LBS

## 2022-03-21 DIAGNOSIS — T36.95XA ANTIBIOTIC-INDUCED YEAST INFECTION: ICD-10-CM

## 2022-03-21 DIAGNOSIS — H65.92 LEFT NON-SUPPURATIVE OTITIS MEDIA: Primary | ICD-10-CM

## 2022-03-21 DIAGNOSIS — B37.9 ANTIBIOTIC-INDUCED YEAST INFECTION: ICD-10-CM

## 2022-03-21 PROCEDURE — 99213 OFFICE O/P EST LOW 20 MIN: CPT

## 2022-03-21 RX ORDER — AMOXICILLIN 875 MG/1
875 TABLET, COATED ORAL 2 TIMES DAILY
Qty: 14 TABLET | Refills: 0 | Status: SHIPPED | OUTPATIENT
Start: 2022-03-21 | End: 2022-03-28

## 2022-03-21 RX ORDER — FLUCONAZOLE 150 MG/1
150 TABLET ORAL ONCE
Qty: 1 TABLET | Refills: 0 | Status: SHIPPED | OUTPATIENT
Start: 2022-03-21 | End: 2022-03-21

## 2022-03-21 NOTE — PROGRESS NOTES
[facility]        NAME: Deedee Peralta is a 23 y o  female  : 1995    MRN: [id number]  DATE: 2022  TIME: 5:09 PM    Assessment and Plan   Left non-suppurative otitis media [H65 92]  1  Left non-suppurative otitis media  amoxicillin (AMOXIL) 875 mg tablet   2  Antibiotic-induced yeast infection  fluconazole (DIFLUCAN) 150 mg tablet         Patient Instructions     Take the antibiotics with food  Take probiotics or eat yogurt with live cultures  Follow up with PCP in 3-5 days  Proceed to  ER if symptoms worsen  Chief Complaint     Chief Complaint   Patient presents with    Earache     B/L earache started yesterday         History of Present Illness       Earache   There is pain in the left ear  This is a new problem  The current episode started in the past 7 days  The problem occurs constantly  The problem has been gradually worsening  There has been no fever  The pain is mild  Associated symptoms include headaches and neck pain  Pertinent negatives include no abdominal pain, coughing, diarrhea, ear discharge, rhinorrhea, sore throat or vomiting  She has tried ear drops and heat packs for the symptoms  The treatment provided no relief  Review of Systems   Review of Systems   Constitutional: Negative for activity change, appetite change, chills and fever  HENT: Positive for ear pain, sinus pressure and sinus pain  Negative for ear discharge, facial swelling, rhinorrhea and sore throat  Respiratory: Negative for cough and chest tightness  Cardiovascular: Negative for chest pain and palpitations  Gastrointestinal: Negative for abdominal pain, diarrhea, nausea and vomiting  Musculoskeletal: Positive for neck pain  Neurological: Positive for dizziness and headaches  Negative for weakness and numbness  All other systems reviewed and are negative          Current Medications       Current Outpatient Medications:     Cyanocobalamin 5000 MCG SUBL, Place under the tongue, Disp: , Rfl:     Fe Bisgly-Vit C-Vit B12-FA 28-60-0 008-0 4 MG CAPS, Take by mouth, Disp: , Rfl:     Ginkgo Biloba (GINKOBA PO), Take by mouth, Disp: , Rfl:     IRON-FOLIC ACID PO, Take by mouth, Disp: , Rfl:     VITAMIN D PO, Take by mouth, Disp: , Rfl:     amoxicillin (AMOXIL) 875 mg tablet, Take 1 tablet (875 mg total) by mouth 2 (two) times a day for 7 days, Disp: 14 tablet, Rfl: 0    fluconazole (DIFLUCAN) 150 mg tablet, Take 1 tablet (150 mg total) by mouth once for 1 dose, Disp: 1 tablet, Rfl: 0    Current Allergies     Allergies as of 03/21/2022    (No Known Allergies)            The following portions of the patient's history were reviewed and updated as appropriate: allergies, current medications, past family history, past medical history, past social history, past surgical history and problem list      Past Medical History:   Diagnosis Date    ADHD     Anemia     Migraine     Mononucleosis        Past Surgical History:   Procedure Laterality Date    SINUS SURGERY      WISDOM TOOTH EXTRACTION         Family History   Problem Relation Age of Onset    Hypertension Mother     Hypertension Father     No Known Problems Brother     Hypertension Maternal Grandmother     Cancer Maternal Grandfather         bladder    Lupus Paternal Grandmother     Diabetes Paternal Grandmother     Alzheimer's disease Paternal Grandfather          Medications have been verified  Objective   /72   Pulse 55   Temp 97 6 °F (36 4 °C)   Resp 16   Ht 5' 5" (1 651 m)   Wt 59 kg (130 lb)   SpO2 98%   BMI 21 63 kg/m²        Physical Exam     Physical Exam  Vitals and nursing note reviewed  Constitutional:       General: She is not in acute distress  Appearance: Normal appearance  She is normal weight  She is not ill-appearing or toxic-appearing     HENT:      Right Ear: Tympanic membrane normal       Ears:      Comments: Erythematous, bulging TM without rupture     Nose: Congestion and rhinorrhea present  Mouth/Throat:      Pharynx: Oropharynx is clear  No oropharyngeal exudate or posterior oropharyngeal erythema  Cardiovascular:      Rate and Rhythm: Normal rate and regular rhythm  Pulses: Normal pulses  Heart sounds: Normal heart sounds  Pulmonary:      Effort: Pulmonary effort is normal       Breath sounds: Normal breath sounds  Musculoskeletal:         General: Normal range of motion  Lymphadenopathy:      Cervical: No cervical adenopathy  Skin:     General: Skin is warm and dry  Capillary Refill: Capillary refill takes less than 2 seconds  Neurological:      General: No focal deficit present  Mental Status: She is alert and oriented to person, place, and time

## 2022-03-21 NOTE — PATIENT INSTRUCTIONS
Take the antibiotics with food  Take probiotics or eat yogurt with live cultures  Otitis Media, Ambulatory Care   GENERAL INFORMATION:   Otitis media  is an ear infection  Common symptoms include the following:   · Fever or a headache    · Ear pain    · Trouble hearing    · Ear feels plugged or full or you have ringing or buzzing in your ear    · Dizziness or you lose your balance    · Nausea or vomiting  Seek immediate care for the following symptoms:   · Seizure    · Fever and a stiff neck  Treatment for otitis media  may include any of the following:  · NSAIDs  help decrease swelling and pain or fever  This medicine is available with or without a doctor's order  NSAIDs can cause stomach bleeding or kidney problems in certain people  If you take blood thinner medicine, always ask your healthcare provider if NSAIDs are safe for you  Always read the medicine label and follow directions  · Ear drops  to help treat your ear pain  · Antibiotics  to help kill the germs that caused your ear infection  Care for otitis media:   · Use heat  Place a warm, moist washcloth on your ear to decrease pain  Apply for 15 to 20 minutes, 3 to 4 times a day    · Use ice  Ice helps decrease swelling and pain  Use an ice pack or put crushed ice in a plastic bag  Cover the ice pack with a towel and place it on your ear for 15 to 20 minutes, 3 to 4 times a day for 2 days  Prevent otitis media:   · Wash your hands often  This will help prevent the spread of germs  Encourage everyone in your house to wash their hands with soap and water after they use the bathroom  Everyone should also wash their hands after they change a child's diaper and before they prepare or eat food  · Stay away from people who are ill  Germs are easily and quickly spread through contact  Follow up with your healthcare provider as directed:  Write down your questions so you remember to ask them during your visits     CARE AGREEMENT:   You have the right to help plan your care  Learn about your health condition and how it may be treated  Discuss treatment options with your caregivers to decide what care you want to receive  You always have the right to refuse treatment  The above information is an  only  It is not intended as medical advice for individual conditions or treatments  Talk to your doctor, nurse or pharmacist before following any medical regimen to see if it is safe and effective for you  © 2014 [address] is for End User's use only and may not be sold, redistributed or otherwise used for commercial purposes  All illustrations and images included in CareNotes® are the copyrighted property of [employer]  or Tomer Jaime

## 2022-04-04 ENCOUNTER — OFFICE VISIT (OUTPATIENT)
Dept: OBGYN CLINIC | Facility: CLINIC | Age: 27
End: 2022-04-04
Payer: COMMERCIAL

## 2022-04-04 VITALS
BODY MASS INDEX: 22.59 KG/M2 | HEIGHT: 65 IN | DIASTOLIC BLOOD PRESSURE: 62 MMHG | SYSTOLIC BLOOD PRESSURE: 120 MMHG | WEIGHT: 135.6 LBS

## 2022-04-04 DIAGNOSIS — Z01.419 ROUTINE GYNECOLOGICAL EXAMINATION: Primary | ICD-10-CM

## 2022-04-04 DIAGNOSIS — N94.6 DYSMENORRHEA: ICD-10-CM

## 2022-04-04 DIAGNOSIS — R10.2 PELVIC PAIN: ICD-10-CM

## 2022-04-04 DIAGNOSIS — Z12.4 SCREENING FOR MALIGNANT NEOPLASM OF CERVIX: ICD-10-CM

## 2022-04-04 PROCEDURE — G0145 SCR C/V CYTO,THINLAYER,RESCR: HCPCS | Performed by: ADVANCED PRACTICE MIDWIFE

## 2022-04-04 PROCEDURE — 99385 PREV VISIT NEW AGE 18-39: CPT | Performed by: ADVANCED PRACTICE MIDWIFE

## 2022-04-04 PROCEDURE — 1036F TOBACCO NON-USER: CPT | Performed by: ADVANCED PRACTICE MIDWIFE

## 2022-04-04 PROCEDURE — 3008F BODY MASS INDEX DOCD: CPT | Performed by: ADVANCED PRACTICE MIDWIFE

## 2022-04-04 RX ORDER — NAPROXEN SODIUM 550 MG/1
550 TABLET ORAL 2 TIMES DAILY WITH MEALS
Qty: 30 TABLET | Refills: 1 | Status: SHIPPED | OUTPATIENT
Start: 2022-04-04

## 2022-04-04 NOTE — PATIENT INSTRUCTIONS
Breast Self Exam for Women   AMBULATORY CARE:   A breast self-exam (BSE)  is a way to check your breasts for lumps and other changes  Regular BSEs can help you know how your breasts normally look and feel  Most breast lumps or changes are not cancer, but you should always have them checked by a healthcare provider  Why you should do a BSE:  Breast cancer is the most common type of cancer in women  Even if you have mammograms, you may still want to do a BSE regularly  If you know how your breasts normally feel and look, it may help you know when to contact your healthcare provider  Mammograms can miss some cancers  You may find a lump during a BSE that did not show up on a mammogram   When you should do a BSE:  If you have periods, you may want to do your BSE 1 week after your period ends  This is the time when your breasts may be the least swollen, lumpy, or tender  You can do regular BSEs even if you are breastfeeding or have breast implants  Call your doctor if:   · You find any lumps or changes in your breasts  · You have breast pain or fluid coming from your nipples  · You have questions or concerns about your condition or care  How to do a BSE:       · Look at your breasts in a mirror  Look at the size and shape of each breast and nipple  Check for swelling, lumps, dimpling, scaly skin, or other skin changes  Look for nipple changes, such as a nipple that is painful or beginning to pull inward  Gently squeeze both nipples and check to see if fluid (that is not breast milk) comes out of them  If you find any of these or other breast changes, contact your healthcare provider  Check your breasts while you sit or  the following 3 positions:    ? Hang your arms down at your sides  ? Raise your hands and join them behind your head  ? Put firm pressure with your hands on your hips  Bend slightly forward while you look at your breasts in the mirror  · Lie down and feel your breasts    When you lie down, your breast tissue spreads out evenly over your chest  This makes it easier for you to feel for lumps and anything that may not be normal for your breasts  Do a BSE on one breast at a time  ? Place a small pillow or towel under your left shoulder  Put your left arm behind your head  ? Use the 3 middle fingers of your right hand  Use your fingertip pads, on the top of your fingers  Your fingertip pad is the most sensitive part of your finger  ? Use small circles to feel your breast tissue  Use your fingertip pads to make dime-sized, overlapping circles on your breast and armpits  Use light, medium, and firm pressure  First, press lightly  Second, press with medium pressure to feel a little deeper into the breast  Last, use firm pressure to feel deep within your breast     ? Examine your entire breast area  Examine the breast area from above the breast to below the breast where you feel only ribs  Make small circles with your fingertips, starting in the middle of your armpit  Make circles going up and down the breast area  Continue toward your breast and all the way across it  Examine the area from your armpit all the way over to the middle of your chest (breastbone)  Stop at the middle of your chest     ? Move the pillow or towel to your right shoulder, and put your right arm behind your head  Use the 3 fingertip pads of your left hand, and repeat the above steps to do a BSE on your right breast     What else you can do to check for breast problems or cancer:  Talk to your healthcare provider about mammograms  A mammogram is an x-ray of your breasts to screen for breast cancer or other problems  Your provider can tell you the benefits and risks of mammograms  The first mammogram is usually at age 39 or 48  Your provider may recommend you start at 36 or younger if your risk for breast cancer is high  Mammograms usually continue every 1 to 2 years until age 76         Follow up with your doctor as directed:  Write down your questions so you remember to ask them during your visits  © Copyright [employer] 2022 Information is for End User's use only and may not be sold, redistributed or otherwise used for commercial purposes  All illustrations and images included in CareNotes® are the copyrighted property of A D A M , Inc  or Italia Kin[employer]  The above information is an  only  It is not intended as medical advice for individual conditions or treatments  Talk to your doctor, nurse or pharmacist before following any medical regimen to see if it is safe and effective for you

## 2022-04-04 NOTE — PROGRESS NOTES
[facility] [address], [place]    ASSESSMENT/PLAN: Jeanine Alvarenga is a 33 y o  No obstetric history on file  who presents for annual gynecologic exam     Encounter for routine gynecologic examination  - Routine well woman exam completed today  - Cervical Cancer Screening: Current ASCCP Guidelines reviewed  Last Pap: 04/04/2022   Next Pap Due: today  - HPV Vaccination status: had 1 dose- reaction to vaccine  - STI screening offered including HIV testing: not indicated based on hx or requested at time of visit  - Contraceptive counseling discussed  Current contraception: Paraguard IUD since 6/2020:   - RTO 1 yr    Additional problems addressed during this visit:  1  Routine gynecological examination  -     Liquid-based pap, screening    2  Screening for malignant neoplasm of cervix  -     Liquid-based pap, screening    3  Dysmenorrhea  -     naproxen sodium (ANAPROX) 550 mg tablet; Take 1 tablet (550 mg total) by mouth 2 (two) times a day with meals    4  Pelvic pain  -     naproxen sodium (ANAPROX) 550 mg tablet; Take 1 tablet (550 mg total) by mouth 2 (two) times a day with meals  -     US pelvis complete w transvaginal; Future; Expected date: 04/04/2022    - if pain continues will get pelvic ultrasound    CC:  Annual Gynecologic Examination    HPI: Jeanine Alvarenga is a 33 y o  No obstetric history on file  who presents for annual gynecologic examination  Glenny Zaldivar presents for gyn exam today  3/3/2022 LMP  Menses is have been irregular 30-40 days, flow approximately 5 days long and 2nd day is the worst- states that she takes motrin- notes that it is only on the right side and notes that pain radiates down right leg  Using Paraguard as birth control method  States that this has been the best birth control method  Currently taking a supplement to improve cycle changes  Has intense breast pain before menses starts  Last pap smear 2020- normal   6-8 sleep per day   1-3 servings of calcium rich food per day, takes mg-Ca, Zinc  3-4 days exercise per week  1 servings of caffeine per day  Ocassionally perform SBE monthly  Concerns: has tried the patch, multiple pills and Nexplanon had reaction to hormones and is using Paraguard  States that she has hx of right ovarian cyst rupture as a teen  The following portions of the patient's history were reviewed and updated as appropriate: allergies, current medications, past family history, past medical history, obstetric history, gynecologic history, past social history, past surgical history and problem list     Review of Systems   Constitutional: Negative for activity change, appetite change, fatigue and fever  Respiratory: Negative for cough and shortness of breath  Cardiovascular: Negative for chest pain, palpitations and leg swelling  Gastrointestinal: Negative for constipation and diarrhea  Genitourinary: Positive for pelvic pain  Negative for difficulty urinating, dysuria, frequency, menstrual problem, vaginal bleeding, vaginal discharge and vaginal pain  Neurological: Negative for light-headedness and headaches  Psychiatric/Behavioral: The patient is not nervous/anxious  Objective:  /62 (BP Location: Right arm, Patient Position: Sitting, Cuff Size: Standard)   Ht 5' 5" (1 651 m)   Wt 61 5 kg (135 lb 9 6 oz)   LMP 03/03/2022 (Exact Date)   BMI 22 57 kg/m²    Physical Exam  Vitals reviewed  Constitutional:       Appearance: Normal appearance  HENT:      Head: Normocephalic  Neck:      Thyroid: No thyroid mass or thyroid tenderness  Cardiovascular:      Rate and Rhythm: Normal rate and regular rhythm  Heart sounds: Normal heart sounds  Pulmonary:      Effort: Pulmonary effort is normal       Breath sounds: Normal breath sounds  Chest:   Breasts:      Right: No mass, nipple discharge, skin change, tenderness or axillary adenopathy        Left: No mass, nipple discharge, skin change, tenderness or axillary adenopathy  Abdominal:      General: There is no distension  Palpations: There is no mass  Tenderness: There is no abdominal tenderness  There is no guarding  Genitourinary:     General: Normal vulva  Exam position: Lithotomy position  Labia:         Right: No tenderness or lesion  Left: No tenderness or lesion  Vagina: No vaginal discharge, tenderness, bleeding or lesions  Cervix: No discharge, lesion, erythema or cervical bleeding  Uterus: Normal  Not enlarged and not tender  Adnexa:         Right: No mass, tenderness or fullness  Left: No mass, tenderness or fullness  Comments: IUD string visualized  Musculoskeletal:      Cervical back: Normal range of motion  Lymphadenopathy:      Upper Body:      Right upper body: No axillary adenopathy  Left upper body: No axillary adenopathy  Skin:     General: Skin is warm and dry  Neurological:      Mental Status: She is alert     Psychiatric:         Mood and Affect: Mood normal          Behavior: Behavior normal          Judgment: Judgment normal

## 2022-04-11 ENCOUNTER — TELEPHONE (OUTPATIENT)
Dept: OBGYN CLINIC | Facility: CLINIC | Age: 27
End: 2022-04-11

## 2022-04-11 LAB
LAB AP GYN PRIMARY INTERPRETATION: NORMAL
Lab: NORMAL

## 2022-04-11 NOTE — TELEPHONE ENCOUNTER
Pt called office stated she has a lot of pain 10 out of 10 was going to go to the ER was taking tylenol and ibuprofen would like a call back  Stated she will be going for ultrasound sooner  Please review previous appt notes and contact pt

## 2022-04-11 NOTE — TELEPHONE ENCOUNTER
Spoke with Gage at this time  Stated she had a 10/10 pain in her abd on Saturday and was going to go to the ED but took tylenol and motrin and pain subsided  Mild pain noted on Sunday  Pain today is only 1/10 and described as more sore not painful  No heavy bleeding noted  Pt wanted to let you know she was scheduling for the us asap  Would also like to know if you have any other recommendations   She is willing to try new bc at this time due to how intense the pain was on saturday

## 2022-04-11 NOTE — TELEPHONE ENCOUNTER
I would recommend that she get u/s and then we can discuss options  eCce Joaquin did leave a message for her

## 2022-04-11 NOTE — TELEPHONE ENCOUNTER
Tried to return patients call, but had to [facility]  I did tell her to try to get me in the [place] office since I am here today

## 2022-04-13 ENCOUNTER — HOSPITAL ENCOUNTER (OUTPATIENT)
Dept: ULTRASOUND IMAGING | Facility: HOSPITAL | Age: 27
Discharge: HOME | End: 2022-04-13
Payer: COMMERCIAL

## 2022-04-13 DIAGNOSIS — R10.2 PELVIC PAIN: ICD-10-CM

## 2022-04-13 PROCEDURE — 76856 US EXAM PELVIC COMPLETE: CPT

## 2022-04-13 PROCEDURE — 76830 TRANSVAGINAL US NON-OB: CPT

## 2022-04-27 ENCOUNTER — TELEPHONE (OUTPATIENT)
Dept: OBGYN CLINIC | Facility: MEDICAL CENTER | Age: 27
End: 2022-04-27

## 2022-04-27 DIAGNOSIS — N94.6 DYSMENORRHEA: ICD-10-CM

## 2022-04-27 DIAGNOSIS — R10.2 PELVIC PAIN: Primary | ICD-10-CM

## 2022-04-27 RX ORDER — ACETAMINOPHEN AND CODEINE PHOSPHATE 120; 12 MG/5ML; MG/5ML
1 SOLUTION ORAL DAILY
Qty: 28 TABLET | Refills: 3 | Status: SHIPPED | OUTPATIENT
Start: 2022-04-27 | End: 2022-06-23

## 2022-04-27 RX ORDER — FLUCONAZOLE 150 MG/1
TABLET ORAL
COMMUNITY
Start: 2022-03-21 | End: 2022-06-23

## 2022-04-27 NOTE — TELEPHONE ENCOUNTER
Spoke with Corwin Postachio  States that she is continuing to have pelvic pain sporadically and some episodes of spotting  She has done pregnancy tests that are negative  Has not found any benefit from using FLOW  We discussed possibility that the she is sensitive to the presence of the Paragard or the Copper in the Paragard  Will try a progestin only pill with onset of next period  In the past she has had weight loss and hair loss with different pills, but is willing to try since there has been no other explanation

## 2022-04-27 NOTE — TELEPHONE ENCOUNTER
Patient called is a little upset and would like to speak to you about the medication that was prescribed to her  Please review when you get a chance   Thank you

## 2022-06-22 NOTE — PROGRESS NOTES
Assessment/Plan:      Diagnoses and all orders for this visit:    Numbness and tingling in right hand  -     Nerve conduction test; Future    Alpha-1-antitrypsin deficiency ([facility] [address] )  -     Alpha-1-Antitrypsin Deficiency; Future          Return for Next scheduled follow up with pcp  The following portions of the patient's history were reviewed and updated as appropriate: allergies, current medications, past family history, past medical history, past social history, past surgical history, and problem list      Subjective:     Patient ID: Kristina Berg is a 33 y o  female  HPI    Patient here for evaluation after her chiropractor advised her to do so for right hand numbness  Reports fingers get numbness  Reports right hand  At her job she does a lot of twisting- dialysis tech  Reports been going on for 3 months where it is worsening  She did massage therapy for the shoulder to help with tight shoulder and didn't help it  In college she was supposed to get tested for thoracic outlet syndrome and never follow through  She has trouble writing because holding the pencil is hard  Right handed  Reports occasionally comes and goes  Reports symptoms everyday  Reports sometimes wakes up and its numb  Patient also reports dad recently diagnosed with alpha 1 antitrypsin deficiency and she would like to get tested  Requesting TEO as well because grandmother has lupus  She denies any symptoms so we discussed that TEO is not a screening test done regularly without symptoms as it is nonspecific and can be elevated even in patients that dont have lupus        PHQ-9 Depression Screening    Little interest or pleasure in doing things: 1 - several days  Feeling down, depressed, or hopeless: 1 - several days          Current Outpatient Medications on File Prior to Visit   Medication Sig Dispense Refill    Cyanocobalamin 5000 MCG SUBL Place under the tongue      Fe Bisgly-Vit C-Vit B12-FA 28-60-0 008-0 4 MG CAPS Take by mouth      Ginkgo Biloba (GINKOBA PO) Take by mouth      IRON-FOLIC ACID PO Take by mouth      naproxen sodium (ANAPROX) 550 mg tablet Take 1 tablet (550 mg total) by mouth 2 (two) times a day with meals 30 tablet 1    VITAMIN D PO Take by mouth      [DISCONTINUED] fluconazole (DIFLUCAN) 150 mg tablet take 1 tablet by mouth AS A ONE TIME DOSE (Patient not taking: Reported on 6/23/2022)      [DISCONTINUED] norethindrone (Ortho Micronor) 0 35 MG tablet Take 1 tablet (0 35 mg total) by mouth daily (Patient not taking: Reported on 6/23/2022) 28 tablet 3     No current facility-administered medications on file prior to visit  Review of Systems      Objective:    Vitals:    06/23/22 0712   BP: 108/70   Temp: 98 1 °F (36 7 °C)   Weight: 64 kg (141 lb)   Height: 5' 5" (1 651 m)         Physical Exam  Musculoskeletal:      Right hand: No swelling, tenderness or bony tenderness  Normal range of motion  Decreased strength (decreased  strength)  Decreased sensation (reports tingling Pinky and 4th finger tingling with phalens  no symptoms with tinels)  Left hand: Normal  No swelling, tenderness or bony tenderness  Normal range of motion  Cervical back: Normal range of motion and neck supple  Spasms (right trapezius) and tenderness present  No bony tenderness  No pain with movement  Normal range of motion        Comments: spurling negative bilaterally

## 2022-06-23 ENCOUNTER — OFFICE VISIT (OUTPATIENT)
Dept: FAMILY MEDICINE CLINIC | Facility: CLINIC | Age: 27
End: 2022-06-23
Payer: COMMERCIAL

## 2022-06-23 ENCOUNTER — APPOINTMENT (OUTPATIENT)
Dept: LAB | Facility: CLINIC | Age: 27
End: 2022-06-23
Payer: COMMERCIAL

## 2022-06-23 VITALS
WEIGHT: 141 LBS | BODY MASS INDEX: 23.49 KG/M2 | HEIGHT: 65 IN | SYSTOLIC BLOOD PRESSURE: 108 MMHG | TEMPERATURE: 98.1 F | DIASTOLIC BLOOD PRESSURE: 70 MMHG

## 2022-06-23 DIAGNOSIS — R53.83 FATIGUE, UNSPECIFIED TYPE: ICD-10-CM

## 2022-06-23 DIAGNOSIS — R20.0 NUMBNESS AND TINGLING IN RIGHT HAND: Primary | ICD-10-CM

## 2022-06-23 DIAGNOSIS — Z11.59 NEED FOR HEPATITIS C SCREENING TEST: ICD-10-CM

## 2022-06-23 DIAGNOSIS — Z11.4 SCREENING FOR HIV (HUMAN IMMUNODEFICIENCY VIRUS): ICD-10-CM

## 2022-06-23 DIAGNOSIS — E88.01 ALPHA-1-ANTITRYPSIN DEFICIENCY (HCC): ICD-10-CM

## 2022-06-23 DIAGNOSIS — R53.83 OTHER FATIGUE: ICD-10-CM

## 2022-06-23 DIAGNOSIS — D50.9 IRON DEFICIENCY ANEMIA, UNSPECIFIED IRON DEFICIENCY ANEMIA TYPE: ICD-10-CM

## 2022-06-23 DIAGNOSIS — R20.2 NUMBNESS AND TINGLING IN RIGHT HAND: Primary | ICD-10-CM

## 2022-06-23 LAB
BASOPHILS # BLD AUTO: 0.04 THOUSANDS/ΜL (ref 0–0.1)
BASOPHILS NFR BLD AUTO: 1 % (ref 0–1)
EOSINOPHIL # BLD AUTO: 0.07 THOUSAND/ΜL (ref 0–0.61)
EOSINOPHIL NFR BLD AUTO: 2 % (ref 0–6)
ERYTHROCYTE [DISTWIDTH] IN BLOOD BY AUTOMATED COUNT: 11.9 % (ref 11.6–15.1)
FERRITIN SERPL-MCNC: 14 NG/ML (ref 8–388)
HCT VFR BLD AUTO: 40.4 % (ref 34.8–46.1)
HCV AB SER QL: NORMAL
HGB BLD-MCNC: 13.9 G/DL (ref 11.5–15.4)
IMM GRANULOCYTES # BLD AUTO: 0.01 THOUSAND/UL (ref 0–0.2)
IMM GRANULOCYTES NFR BLD AUTO: 0 % (ref 0–2)
IRON SATN MFR SERPL: 57 % (ref 15–50)
IRON SERPL-MCNC: 225 UG/DL (ref 50–170)
LYMPHOCYTES # BLD AUTO: 1.62 THOUSANDS/ΜL (ref 0.6–4.47)
LYMPHOCYTES NFR BLD AUTO: 36 % (ref 14–44)
MCH RBC QN AUTO: 31.5 PG (ref 26.8–34.3)
MCHC RBC AUTO-ENTMCNC: 34.4 G/DL (ref 31.4–37.4)
MCV RBC AUTO: 92 FL (ref 82–98)
MONOCYTES # BLD AUTO: 0.33 THOUSAND/ΜL (ref 0.17–1.22)
MONOCYTES NFR BLD AUTO: 7 % (ref 4–12)
NEUTROPHILS # BLD AUTO: 2.4 THOUSANDS/ΜL (ref 1.85–7.62)
NEUTS SEG NFR BLD AUTO: 54 % (ref 43–75)
NRBC BLD AUTO-RTO: 0 /100 WBCS
PLATELET # BLD AUTO: 237 THOUSANDS/UL (ref 149–390)
PMV BLD AUTO: 10.1 FL (ref 8.9–12.7)
RBC # BLD AUTO: 4.41 MILLION/UL (ref 3.81–5.12)
TIBC SERPL-MCNC: 397 UG/DL (ref 250–450)
TSH SERPL DL<=0.05 MIU/L-ACNC: 2.85 UIU/ML (ref 0.45–4.5)
WBC # BLD AUTO: 4.47 THOUSAND/UL (ref 4.31–10.16)

## 2022-06-23 PROCEDURE — 3725F SCREEN DEPRESSION PERFORMED: CPT | Performed by: FAMILY MEDICINE

## 2022-06-23 PROCEDURE — 83550 IRON BINDING TEST: CPT

## 2022-06-23 PROCEDURE — 99214 OFFICE O/P EST MOD 30 MIN: CPT | Performed by: FAMILY MEDICINE

## 2022-06-23 PROCEDURE — 86803 HEPATITIS C AB TEST: CPT

## 2022-06-23 PROCEDURE — 3008F BODY MASS INDEX DOCD: CPT | Performed by: FAMILY MEDICINE

## 2022-06-23 PROCEDURE — 36415 COLL VENOUS BLD VENIPUNCTURE: CPT

## 2022-06-23 PROCEDURE — 84443 ASSAY THYROID STIM HORMONE: CPT

## 2022-06-23 PROCEDURE — 85025 COMPLETE CBC W/AUTO DIFF WBC: CPT

## 2022-06-23 PROCEDURE — 82103 ALPHA-1-ANTITRYPSIN TOTAL: CPT

## 2022-06-23 PROCEDURE — 87389 HIV-1 AG W/HIV-1&-2 AB AG IA: CPT

## 2022-06-23 PROCEDURE — 1036F TOBACCO NON-USER: CPT | Performed by: FAMILY MEDICINE

## 2022-06-23 PROCEDURE — 83540 ASSAY OF IRON: CPT

## 2022-06-23 PROCEDURE — 82728 ASSAY OF FERRITIN: CPT

## 2022-06-24 LAB
A1AT SERPL-MCNC: 120 MG/DL (ref 100–188)
HIV 1+2 AB+HIV1 P24 AG SERPL QL IA: NORMAL

## 2022-09-19 ENCOUNTER — PROCEDURE VISIT (OUTPATIENT)
Dept: NEUROLOGY | Facility: CLINIC | Age: 27
End: 2022-09-19
Payer: COMMERCIAL

## 2022-09-19 DIAGNOSIS — R20.2 NUMBNESS AND TINGLING OF RIGHT HAND: ICD-10-CM

## 2022-09-19 DIAGNOSIS — R20.0 NUMBNESS AND TINGLING OF RIGHT HAND: ICD-10-CM

## 2022-09-19 PROCEDURE — 95886 MUSC TEST DONE W/N TEST COMP: CPT | Performed by: PHYSICAL MEDICINE & REHABILITATION

## 2022-09-19 PROCEDURE — 95909 NRV CNDJ TST 5-6 STUDIES: CPT | Performed by: PHYSICAL MEDICINE & REHABILITATION

## 2022-09-19 NOTE — PROGRESS NOTES
EMG 1 Limb     Date/Time 9/19/2022 11:01 AM     Performed by  Donalda Phoenix, MD     Authorized by Antonia Li MD                [facility] [address]  [phone]    Electromyography & Nerve Conduction Studies Report          Full Name: Phil Workman Gender: Female  MRN: [id number] YOB: 1995      Visit Date: 9/19/2022 9:18 AM  Age: 33 Years  Examining Physician: Dr Medardo Hinson  Referring Physician: Dr Clara Yanez History: 33years old right-handed female presents with complaints of numbness in all fingers for the last 3 months  At times, she notes pain in her right shoulder which is not relieved with massage  Numbness in the fingers does wake her up from sleep sometimes  Patient is being evaluated for a focal neuropathy  Temperature 32 degrees  Sensory Nerve Conduction Study       Nerve / Sites Rec  Site Onset Lat Peak Lat  Amp Segments Distance Velocity     ms ms µV  cm m/s   R Median - Dig II (Antidromic)  Wrist Index 2 0 2 9 58 2 Wrist - Index 14 69      Ref  ?3 4  ? 20 0 Ref  ?50   R Ulnar - Dig V (Antidromic)  Wrist Dig V 2 1 2 9 36 2 Wrist - Dig V 12 56      Ref  ?2 9  ? 17 0 Ref  ?50   R Radial - Superficial (Antidromic)      Forearm Wrist 1 4 2 0 30 4 Forearm - Wrist 10 71      Ref  ?2 9 ? 15 0 Ref  ?50       Motor Nerve Conduction Study       Nerve / Sites Muscle Latency Ref  Amplitude Ref  Segments Distance Lat Diff Velocity Ref  ms ms mV mV  cm ms m/s m/s   R Median - APB      Wrist APB 3 0 ?4 4 10 6 ?4 0 Wrist - APB 7         Elbow APB 7 0  8 8  Elbow - Wrist 22 4 06 54 ?49   R Ulnar - ADM      Wrist ADM 2 6 ?3 3 8 4 ?6 0 Wrist - ADM 7         B  Elbow ADM 6 4  9 6  B  Elbow - Wrist 22 3 73 59 ?49      A  Elbow ADM 7 5  9 5  A  Elbow - B  Elbow 10 1 17 86 ?49       F Waves       Nerve F Latency Ref  ms ms   R Median - APB 26 1 ? 31 0   R Ulnar - ADM 27 1 ? 32 0       EMG Summary Table     Spontaneous MUAP Recruitment   Muscle Nerve Roots IA Fib PSW Fasc H F  Dur  Amp PPP Config Pattern   R  First dorsal interosseous Ulnar C8-T1 NL None None None None NL NL None NL NL   R  Deltoid Axillary C5-C6 NL None None None None NL NL None NL NL   R  Biceps brachii Musculocut  C5-C6 NL None None None None NL NL None NL NL   R  Triceps brachii Radial C6-C8 NL None None None None NL NL None NL NL   R  Pronator teres Median C6-C7 NL None None None None NL NL None NL NL   R  Abductor pollicis brevis Median P7-L1 NL None None None None NL NL None NL NL   R  Cervical paraspinals (low)  - NL None None None None NL NL None NL NL                     Summary        Motor and sensory conduction studies were performed on the right median and ulnar nerves  The distal motor latencies were normal  The motor action potential amplitudes were normal  Motor conduction velocities were normal including conduction velocity of the ulnar nerve across the elbow  Median and ulnar F waves were normal     Median and ulnar distal sensory latencies were normal including conduction of the median nerve across the palm with normal sensory action potential amplitudes  Concentric needle EMG was performed on various distal and proximal muscles of the right upper extremity including APB, FDI, pronator teres, deltoid, biceps, triceps and low cervical paraspinal region  There was no evidence of active denervation in any of the muscles tested  The compound motor unit action potentials were of normal configuration with interference patterns being full or full for effort  Impression:        Normal study  1 There is no electrophysiologic evidence of a median or ulnar neuropathy  2  There is no electrophysiologic evidence of a cervical radiculopathy

## 2022-09-28 ENCOUNTER — OFFICE VISIT (OUTPATIENT)
Dept: OBGYN CLINIC | Facility: CLINIC | Age: 27
End: 2022-09-28
Payer: COMMERCIAL

## 2022-09-28 VITALS
HEART RATE: 74 BPM | BODY MASS INDEX: 23.66 KG/M2 | WEIGHT: 142 LBS | SYSTOLIC BLOOD PRESSURE: 131 MMHG | DIASTOLIC BLOOD PRESSURE: 87 MMHG | HEIGHT: 65 IN

## 2022-09-28 DIAGNOSIS — M89.8X6 PAIN OF LEFT TIBIA: Primary | ICD-10-CM

## 2022-09-28 DIAGNOSIS — M25.562 ACUTE PAIN OF LEFT KNEE: ICD-10-CM

## 2022-09-28 PROCEDURE — 99204 OFFICE O/P NEW MOD 45 MIN: CPT | Performed by: FAMILY MEDICINE

## 2022-09-28 NOTE — PROGRESS NOTES
Subjective:    Chief Complaint   Patient presents with    Left Leg - Pain       Porsha Vick is a 33 y o  female complains of left tibia pain  Onset of the symptoms was several months ago  Mechanism of injury: fell off horse  Aggravating factors: direct pressure, standing, walking  and weight bearing  Treatment to date: avoidance of offending activity, ice, OTC analgesics which are ineffective and rest  Symptoms have gradually worsened  Patient reports worsening pain right anterior tibia over the past 2 weeks  Patient is unable to stand on affected extremity without pain  Does report occasional locking of the knee as well    Patient had a fall while riding a horse on June 26, 2022  Evaluated at [facility] where initial radiographs revealed no acute fracture or dislocation of left knee or tib-fib    The following portions of the patient's history were reviewed and updated as appropriate: allergies, current medications, past family history, past medical history, past social history, past surgical history and problem list     Occupation:   nursing school    Review of Systems   Constitutional: Negative for fever  HENT: Negative for dental problem and headaches  Eyes: Negative for vision loss  Respiratory: Negative for cough and shortness of breath  Cardiovascular: Negative for leg swelling and palpitations  Gastrointestinal: Negative for constipation and diarrhea  Genitourinary: Negative for bladder incontinence and difficulty urinating  Musculoskeletal: Negative for back pain and difficulty walking  Skin: Negative for rash and ulcer  Neurological: Negative for dizziness and headaches  Hem/Lymph/Immuno: Negative for blood clots  Does not bruise/bleed easily  Psychiatric/Behavioral: Negative for confusion           Objective:  /87   Pulse 74   Ht 5' 5" (1 651 m)   Wt 64 4 kg (142 lb)   BMI 23 63 kg/m²   Skin: no rashes, lesions, skin discolorations, lacerations  Vasculature: normal popliteal and pedal pulse, normal skin color, normal capillary refill in extremity, no lower extremity edema  Neurologic: Neurologic exam is normal throughout lower extremities, Awake, alert, and oriented x3, no apparent distress  Musculoskeletal: left KNEE EXAM  Gait: limping gait negative  Inspection:No erythema, no induration  There is no gross deformity  Palpation: Swelling: negative  Effusion:  Mild effusion noted  Medial joint line TTP:  Mild tenderness  Lateral joint line TTP: negative  Positive tenderness to palpation over anterior midshaft of the tibia  Positive fulcrum test tibia  Patient is unable to perform hop test on the affected extremity secondary to pain  ROM: Full flexion and extension  Special tests: Mari's: negative, Apley's compression: negative  Instability to varus/valgus stress: negative  Anterior Drawer: negative Lachman's test: negative  Posterior Drawer: negative  Full dorsiflexion plantar flexion inversion and eversion of the left ankle with 5/5 strength  No sensory deficits in the left lower extremity to light touch             Assessment/Plan:  1  Acute pain of left knee  2  Pain of left tibia  - MRI tibia fibula left wo contrast; Future    >45min devoted to review of previous, pertinent medical records, imaging, discussion of treatment options, counseling and documentation  Given worsening pain over midshaft of the tibia ongoing for greater than 3 months we will order an MRI of the tib-fib for further evaluation  Patient will follow-up with the visit to discuss results  Crutches and nonweightbearing status offered patient however she declines after counseling  Will continue to weight bear as tolerated  Directed to ice the area daily for 20 minutes at a time using a barrier to protect the skin- stressed specifically icing after activity to address inflammation  Continue with naproxen as needed for pain  Follow up in once MRI results return   Should sx's worsen or any concerns arise, they were advised to follow up sooner or seek more immediate medical attention  All of the patient's concerns were addressed and questions answered  They verbalized agreement with and understanding of the treatment plan

## 2022-10-06 ENCOUNTER — HOSPITAL ENCOUNTER (OUTPATIENT)
Dept: MRI IMAGING | Facility: HOSPITAL | Age: 27
End: 2022-10-06
Attending: FAMILY MEDICINE
Payer: COMMERCIAL

## 2022-10-06 DIAGNOSIS — M89.8X6 PAIN OF LEFT TIBIA: ICD-10-CM

## 2022-10-06 PROCEDURE — 73718 MRI LOWER EXTREMITY W/O DYE: CPT

## 2022-10-06 PROCEDURE — G1004 CDSM NDSC: HCPCS

## 2022-10-11 ENCOUNTER — TELEPHONE (OUTPATIENT)
Dept: FAMILY MEDICINE CLINIC | Facility: CLINIC | Age: 27
End: 2022-10-11

## 2022-10-11 DIAGNOSIS — M89.8X6 PAIN IN LEFT TIBIA: Primary | ICD-10-CM

## 2022-10-11 NOTE — TELEPHONE ENCOUNTER
Pt called in asking for a referral for orthopedics for her appointment tomorrow for left tibia pain from an accident in June 2022       Please advise

## 2022-11-09 ENCOUNTER — VBI (OUTPATIENT)
Dept: ADMINISTRATIVE | Facility: OTHER | Age: 27
End: 2022-11-09

## 2023-06-26 ENCOUNTER — ANNUAL EXAM (OUTPATIENT)
Dept: OBGYN CLINIC | Facility: CLINIC | Age: 28
End: 2023-06-26
Payer: COMMERCIAL

## 2023-06-26 VITALS
DIASTOLIC BLOOD PRESSURE: 76 MMHG | WEIGHT: 135 LBS | HEIGHT: 65 IN | BODY MASS INDEX: 22.49 KG/M2 | SYSTOLIC BLOOD PRESSURE: 112 MMHG

## 2023-06-26 DIAGNOSIS — N91.4 SECONDARY OLIGOMENORRHEA: ICD-10-CM

## 2023-06-26 DIAGNOSIS — Z11.3 ROUTINE SCREENING FOR STI (SEXUALLY TRANSMITTED INFECTION): ICD-10-CM

## 2023-06-26 DIAGNOSIS — N94.6 DYSMENORRHEA: ICD-10-CM

## 2023-06-26 DIAGNOSIS — Z01.419 ENCOUNTER FOR GYNECOLOGICAL EXAMINATION: Primary | ICD-10-CM

## 2023-06-26 PROCEDURE — 99395 PREV VISIT EST AGE 18-39: CPT | Performed by: STUDENT IN AN ORGANIZED HEALTH CARE EDUCATION/TRAINING PROGRAM

## 2023-06-26 PROCEDURE — 87591 N.GONORRHOEAE DNA AMP PROB: CPT | Performed by: STUDENT IN AN ORGANIZED HEALTH CARE EDUCATION/TRAINING PROGRAM

## 2023-06-26 PROCEDURE — 87491 CHLMYD TRACH DNA AMP PROBE: CPT | Performed by: STUDENT IN AN ORGANIZED HEALTH CARE EDUCATION/TRAINING PROGRAM

## 2023-06-26 RX ORDER — MAGNESIUM GLUCONATE 30 MG(550)
400 TABLET ORAL 2 TIMES DAILY
COMMUNITY

## 2023-06-26 NOTE — PROGRESS NOTES
[facility]  [address] [facility]    ASSESSMENT/PLAN: Leopoldo Melo is a 33 y o  Janis Haus who presents for annual gynecologic exam     Encounter for routine gynecologic examination  - Routine well woman exam completed today  - Cervical Cancer Screening: Current ASCCP Guidelines reviewed  Last Pap: 04/04/2022   Due 2025  - HPV Vaccination status: Immunization series complete  - STI screening offered including HIV testing: Declines  - Contraceptive counseling discussed  Current contraception: Pargard IUD  Has severe dysmenorrhea, suspect endometriosis, however has mood effects from [facility]  Discussed trying Mirena instead of Paraguard given severe dysmenorrhea  Additional problems addressed during this visit:  1  Encounter for gynecological examination    2  Dysmenorrhea  Assessment & Plan:  - Longstandign history of severe dysmenorrhea  However has severe mood disturbance with hormonal contraception  Discussed trying lngIUD (e g  Mirena)      3  Secondary oligomenorrhea  -     TSH, 3rd generation with Free T4 reflex; Future  -     Follicle stimulating hormone; Future  -     Luteinizing hormone; Future  -     Prolactin; Future  -     Testosterone, free, total; Future  -     HIV 1/2 AG/AB w Reflex [facility] for 2 yr old and above; Future  -     RPR-Syphilis Screening (Total Syphilis IGG/IGM); Future  -     Hepatitis B surface antigen; Future    4  Routine screening for STI (sexually transmitted infection)  -     Chlamydia/GC amplified DNA by PCR      CC:  Annual Gynecologic Examination    HPI: Leopoldo Melo is a 33 y o  Janis Ha who presents for annual gynecologic examination  Ria Royalzach presents for annual GYN visit  She has a longstanding history of severe dysmenorrhea for which she has to medicate with ibuprofen 800 mg plus tylenol 1 gram and still needs to take time off from work due to the intensity of the pain  She contracepts with Parguard IUD     She has a history of "severe mood disturbance with hormonal contraception in the past     She denies any breast concerns  Is not currently sexually active  The following portions of the patient's history were reviewed and updated as appropriate: allergies, current medications, past family history, past medical history, obstetric history, gynecologic history, past social history, past surgical history and problem list     Review of Systems   Constitutional: Negative for chills and fever  Respiratory: Negative for cough and shortness of breath  Cardiovascular: Negative for chest pain and leg swelling  Gastrointestinal: Negative for abdominal pain, nausea and vomiting  Genitourinary: Negative for dysuria, frequency and urgency  Neurological: Negative for dizziness, light-headedness and headaches  Objective:  /76   Ht 5' 5\" (1 651 m)   Wt 61 2 kg (135 lb)   LMP 06/16/2023 (Exact Date)   BMI 22 47 kg/m²    Physical Exam  Exam conducted with a chaperone present  Constitutional:       Appearance: Normal appearance  HENT:      Head: Normocephalic and atraumatic  Cardiovascular:      Rate and Rhythm: Normal rate  Pulmonary:      Effort: Pulmonary effort is normal    Chest:   Breasts:     Breasts are symmetrical       Right: Normal  No swelling, bleeding, inverted nipple, mass, nipple discharge, skin change or tenderness  Left: Normal  No swelling, bleeding, inverted nipple, mass, nipple discharge, skin change or tenderness  Abdominal:      General: There is no distension  Tenderness: There is no abdominal tenderness  There is no guarding  Genitourinary:     Exam position: Lithotomy position  Pubic Area: No rash  Labia:         Right: No rash, tenderness or lesion  Left: No rash, tenderness or lesion  Urethra: No prolapse, urethral swelling or urethral lesion  Vagina: Normal  No vaginal discharge, erythema, tenderness, bleeding or lesions        Cervix: No " cervical motion tenderness, discharge, friability or erythema  Uterus: Not enlarged, not tender and no uterine prolapse  Adnexa:         Right: No mass, tenderness or fullness  Left: No mass, tenderness or fullness  Lymphadenopathy:      Upper Body:      Right upper body: No axillary adenopathy  Left upper body: No axillary adenopathy  Lower Body: No right inguinal adenopathy  No left inguinal adenopathy  Neurological:      Mental Status: She is alert

## 2023-06-26 NOTE — ASSESSMENT & PLAN NOTE
- Longstandign history of severe dysmenorrhea  However has severe mood disturbance with hormonal contraception    Discussed trying lngIUD (e g  Mirena)

## 2023-06-27 LAB
C TRACH DNA SPEC QL NAA+PROBE: NEGATIVE
N GONORRHOEA DNA SPEC QL NAA+PROBE: NEGATIVE

## 2023-06-29 ENCOUNTER — APPOINTMENT (OUTPATIENT)
Dept: LAB | Facility: CLINIC | Age: 28
End: 2023-06-29
Payer: COMMERCIAL

## 2023-06-29 DIAGNOSIS — N91.4 SECONDARY OLIGOMENORRHEA: ICD-10-CM

## 2023-06-29 LAB
FSH SERPL-ACNC: 6.3 MIU/ML
HBV SURFACE AG SER QL: NORMAL
HIV 1+2 AB+HIV1 P24 AG SERPL QL IA: NORMAL
HIV 2 AB SERPL QL IA: NORMAL
HIV1 AB SERPL QL IA: NORMAL
HIV1 P24 AG SERPL QL IA: NORMAL
LH SERPL-ACNC: 8.1 MIU/ML
PROLACTIN SERPL-MCNC: 20.83 NG/ML (ref 3.34–26.72)
TREPONEMA PALLIDUM IGG+IGM AB [PRESENCE] IN SERUM OR PLASMA BY IMMUNOASSAY: NORMAL
TSH SERPL DL<=0.05 MIU/L-ACNC: 3.21 UIU/ML (ref 0.45–4.5)

## 2023-06-29 PROCEDURE — 84403 ASSAY OF TOTAL TESTOSTERONE: CPT

## 2023-06-29 PROCEDURE — 86780 TREPONEMA PALLIDUM: CPT

## 2023-06-29 PROCEDURE — 36415 COLL VENOUS BLD VENIPUNCTURE: CPT

## 2023-06-29 PROCEDURE — 87340 HEPATITIS B SURFACE AG IA: CPT

## 2023-06-29 PROCEDURE — 83002 ASSAY OF GONADOTROPIN (LH): CPT

## 2023-06-29 PROCEDURE — 84146 ASSAY OF PROLACTIN: CPT

## 2023-06-29 PROCEDURE — 87389 HIV-1 AG W/HIV-1&-2 AB AG IA: CPT

## 2023-06-29 PROCEDURE — 84402 ASSAY OF FREE TESTOSTERONE: CPT

## 2023-06-29 PROCEDURE — 83001 ASSAY OF GONADOTROPIN (FSH): CPT

## 2023-06-29 PROCEDURE — 84443 ASSAY THYROID STIM HORMONE: CPT

## 2023-06-30 LAB
TESTOST FREE SERPL-MCNC: 2.2 PG/ML (ref 0–4.2)
TESTOST SERPL-MCNC: 40 NG/DL (ref 13–71)

## 2023-07-07 ENCOUNTER — TELEPHONE (OUTPATIENT)
Dept: OBGYN CLINIC | Facility: MEDICAL CENTER | Age: 28
End: 2023-07-07

## 2023-09-29 ENCOUNTER — APPOINTMENT (OUTPATIENT)
Dept: LAB | Facility: MEDICAL CENTER | Age: 28
End: 2023-09-29
Payer: COMMERCIAL

## 2023-09-29 ENCOUNTER — OFFICE VISIT (OUTPATIENT)
Dept: FAMILY MEDICINE CLINIC | Facility: MEDICAL CENTER | Age: 28
End: 2023-09-29

## 2023-09-29 VITALS
TEMPERATURE: 98.2 F | SYSTOLIC BLOOD PRESSURE: 110 MMHG | HEART RATE: 52 BPM | WEIGHT: 136.4 LBS | HEIGHT: 65 IN | OXYGEN SATURATION: 97 % | BODY MASS INDEX: 22.73 KG/M2 | DIASTOLIC BLOOD PRESSURE: 68 MMHG

## 2023-09-29 DIAGNOSIS — M25.50 ARTHRALGIA, UNSPECIFIED JOINT: ICD-10-CM

## 2023-09-29 DIAGNOSIS — H04.123 DRY MOUTH AND EYES: ICD-10-CM

## 2023-09-29 DIAGNOSIS — Z76.89 ENCOUNTER TO ESTABLISH CARE WITH NEW DOCTOR: Primary | ICD-10-CM

## 2023-09-29 DIAGNOSIS — R53.83 FATIGUE, UNSPECIFIED TYPE: ICD-10-CM

## 2023-09-29 DIAGNOSIS — Z13.220 LIPID SCREENING: ICD-10-CM

## 2023-09-29 DIAGNOSIS — R68.2 DRY MOUTH AND EYES: ICD-10-CM

## 2023-09-29 LAB
ALBUMIN SERPL BCP-MCNC: 4.8 G/DL (ref 3.5–5)
ALP SERPL-CCNC: 51 U/L (ref 34–104)
ALT SERPL W P-5'-P-CCNC: 10 U/L (ref 7–52)
ANA SER QL IA: POSITIVE
ANION GAP SERPL CALCULATED.3IONS-SCNC: 8 MMOL/L
AST SERPL W P-5'-P-CCNC: 12 U/L (ref 13–39)
B BURGDOR IGG+IGM SER QL IA: NEGATIVE
BASOPHILS # BLD AUTO: 0.03 THOUSANDS/ÂΜL (ref 0–0.1)
BASOPHILS NFR BLD AUTO: 1 % (ref 0–1)
BILIRUB SERPL-MCNC: 0.63 MG/DL (ref 0.2–1)
BUN SERPL-MCNC: 11 MG/DL (ref 5–25)
CALCIUM SERPL-MCNC: 9.3 MG/DL (ref 8.4–10.2)
CHLORIDE SERPL-SCNC: 104 MMOL/L (ref 96–108)
CHOLEST SERPL-MCNC: 139 MG/DL
CO2 SERPL-SCNC: 28 MMOL/L (ref 21–32)
CREAT SERPL-MCNC: 0.85 MG/DL (ref 0.6–1.3)
CRP SERPL QL: <1 MG/L
EOSINOPHIL # BLD AUTO: 0.06 THOUSAND/ÂΜL (ref 0–0.61)
EOSINOPHIL NFR BLD AUTO: 1 % (ref 0–6)
ERYTHROCYTE [DISTWIDTH] IN BLOOD BY AUTOMATED COUNT: 12.1 % (ref 11.6–15.1)
EST. AVERAGE GLUCOSE BLD GHB EST-MCNC: 103 MG/DL
GFR SERPL CREATININE-BSD FRML MDRD: 94 ML/MIN/1.73SQ M
GLUCOSE P FAST SERPL-MCNC: 89 MG/DL (ref 65–99)
HBA1C MFR BLD: 5.2 %
HCT VFR BLD AUTO: 41.2 % (ref 34.8–46.1)
HDLC SERPL-MCNC: 65 MG/DL
HGB BLD-MCNC: 13.7 G/DL (ref 11.5–15.4)
IMM GRANULOCYTES # BLD AUTO: 0.01 THOUSAND/UL (ref 0–0.2)
IMM GRANULOCYTES NFR BLD AUTO: 0 % (ref 0–2)
LDLC SERPL CALC-MCNC: 65 MG/DL (ref 0–100)
LYMPHOCYTES # BLD AUTO: 1.56 THOUSANDS/ÂΜL (ref 0.6–4.47)
LYMPHOCYTES NFR BLD AUTO: 34 % (ref 14–44)
MCH RBC QN AUTO: 29.7 PG (ref 26.8–34.3)
MCHC RBC AUTO-ENTMCNC: 33.3 G/DL (ref 31.4–37.4)
MCV RBC AUTO: 89 FL (ref 82–98)
MONOCYTES # BLD AUTO: 0.31 THOUSAND/ÂΜL (ref 0.17–1.22)
MONOCYTES NFR BLD AUTO: 7 % (ref 4–12)
NEUTROPHILS # BLD AUTO: 2.59 THOUSANDS/ÂΜL (ref 1.85–7.62)
NEUTS SEG NFR BLD AUTO: 57 % (ref 43–75)
NONHDLC SERPL-MCNC: 74 MG/DL
NRBC BLD AUTO-RTO: 0 /100 WBCS
PLATELET # BLD AUTO: 234 THOUSANDS/UL (ref 149–390)
PMV BLD AUTO: 9.9 FL (ref 8.9–12.7)
POTASSIUM SERPL-SCNC: 4.1 MMOL/L (ref 3.5–5.3)
PROT SERPL-MCNC: 7.5 G/DL (ref 6.4–8.4)
RBC # BLD AUTO: 4.61 MILLION/UL (ref 3.81–5.12)
RHEUMATOID FACT SER QL LA: NEGATIVE
SODIUM SERPL-SCNC: 140 MMOL/L (ref 135–147)
TRIGL SERPL-MCNC: 44 MG/DL
WBC # BLD AUTO: 4.56 THOUSAND/UL (ref 4.31–10.16)

## 2023-09-29 PROCEDURE — 86430 RHEUMATOID FACTOR TEST QUAL: CPT

## 2023-09-29 PROCEDURE — 36415 COLL VENOUS BLD VENIPUNCTURE: CPT

## 2023-09-29 PROCEDURE — 80053 COMPREHEN METABOLIC PANEL: CPT

## 2023-09-29 PROCEDURE — 83036 HEMOGLOBIN GLYCOSYLATED A1C: CPT

## 2023-09-29 PROCEDURE — 85025 COMPLETE CBC W/AUTO DIFF WBC: CPT

## 2023-09-29 PROCEDURE — 86225 DNA ANTIBODY NATIVE: CPT

## 2023-09-29 PROCEDURE — 86039 ANTINUCLEAR ANTIBODIES (ANA): CPT

## 2023-09-29 PROCEDURE — 80061 LIPID PANEL: CPT

## 2023-09-29 PROCEDURE — 86235 NUCLEAR ANTIGEN ANTIBODY: CPT

## 2023-09-29 PROCEDURE — 86140 C-REACTIVE PROTEIN: CPT

## 2023-09-29 PROCEDURE — 86038 ANTINUCLEAR ANTIBODIES: CPT

## 2023-09-29 PROCEDURE — 86618 LYME DISEASE ANTIBODY: CPT

## 2023-09-29 PROCEDURE — 86200 CCP ANTIBODY: CPT

## 2023-09-29 RX ORDER — CYCLOSPORINE 0.5 MG/ML
EMULSION OPHTHALMIC
COMMUNITY
Start: 2023-09-25

## 2023-09-29 NOTE — PROGRESS NOTES
Assessment/Plan:    Check labs as below. Advised about influenza vaccine which she states she will have done at work. Return in 2 weeks for annual physical and follow-up lab results. 1. Encounter to establish care with new doctor  60-year-old female presents to establish care with new provider. Past medical history includes but is not limited to anemia, ADHD, ovarian cyst, migraines. She is concerned with ongoing complaint of fatigue, arthralgias, dry eyes, dry mouth for several years. 2. Fatigue, unspecified type  - Sjogren's Antibodies; Future  - C-reactive protein; Future  - RF Screen w/ Reflex to Titer; Future  - Cyclic citrul peptide antibody, IgG; Future  - Lyme Disease Serology w/Reflex; Future  - TEO Screen w/ Reflex to Titer/Pattern; Future  - CBC and differential; Future  - Comprehensive metabolic panel; Future  - Hemoglobin A1C; Future    3. Dry mouth and eyes  - Sjogren's Antibodies; Future  - C-reactive protein; Future  - RF Screen w/ Reflex to Titer; Future  - Cyclic citrul peptide antibody, IgG; Future  - TEO Screen w/ Reflex to Titer/Pattern; Future  - CBC and differential; Future  - Comprehensive metabolic panel; Future  - Hemoglobin A1C; Future    4. Arthralgia, unspecified joint  - Sjogren's Antibodies; Future  - C-reactive protein; Future  - RF Screen w/ Reflex to Titer; Future  - Cyclic citrul peptide antibody, IgG; Future  - Lyme Disease Serology w/Reflex; Future  - TEO Screen w/ Reflex to Titer/Pattern; Future  - CBC and differential; Future  - Comprehensive metabolic panel; Future    5. Lipid screening  - Lipid panel; Future      Subjective:      Patient ID: Capo Oscar is a 33 y.o. female. 33year old female presents to establish care with new provider. 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   Past medical history includes but is not limited to migraines, anemia, ADHD, ovarian cyst.  She is complaining of chronic dry eyes, dry mouth, fatigue, joint pain, excessive thirst ongoing for years. She was at her eye doctor on Monday and was given Restasis for her dry eyes but has only been using the drops for 2 days so far. She was advised to follow up with pcp for this. He thinks she has a lot of inflammation. She has tried Ibuprofen prior for the pain which does help. She also takes Tumeric daily for inflammation. She has a family history of lupus. She has not yet had a workup for this. Fatigue  Associated symptoms include arthralgias, fatigue and headaches. Headache      The following portions of the patient's history were reviewed and updated as appropriate: allergies, past family history, past medical history, past social history, past surgical history and problem list.    Review of Systems   Constitutional: Positive for fatigue. HENT: Negative. Dry mouth     Eyes:        Dry eyes     Respiratory: Negative. Cardiovascular: Negative. Gastrointestinal: Negative. Endocrine: Positive for polydipsia. Genitourinary: Negative. Musculoskeletal: Positive for arthralgias. Skin: Negative. Allergic/Immunologic: Negative. Neurological: Positive for headaches. Hematological: Negative. Psychiatric/Behavioral: Negative. Objective:      /68 (BP Location: Left arm, Patient Position: Sitting, Cuff Size: Adult)   Pulse (!) 52   Temp 98.2 °F (36.8 °C)   Ht 5' 5" (1.651 m)   Wt 61.9 kg (136 lb 6.4 oz)   LMP 08/31/2023 (Exact Date)   SpO2 97%   BMI 22.70 kg/m²          Physical Exam  Vitals and nursing note reviewed. Constitutional:       General: She is not in acute distress. Appearance: Normal appearance. She is not ill-appearing. HENT:      Head: Normocephalic and atraumatic. Nose: Nose normal.      Mouth/Throat:      Pharynx: Oropharynx is clear. Eyes:      General:         Right eye: No discharge. Left eye: No discharge.       Conjunctiva/sclera: Conjunctivae normal.   Cardiovascular:      Rate and Rhythm: Regular rhythm. Bradycardia present. Pulses: Normal pulses. Heart sounds: Normal heart sounds. Pulmonary:      Effort: Pulmonary effort is normal.      Breath sounds: Normal breath sounds. Abdominal:      General: Abdomen is flat. Bowel sounds are normal.      Palpations: Abdomen is soft. Tenderness: There is no abdominal tenderness. Musculoskeletal:      Cervical back: Normal range of motion and neck supple. Skin:     General: Skin is warm and dry. Neurological:      General: No focal deficit present. Mental Status: She is alert and oriented to person, place, and time. Mental status is at baseline. Psychiatric:         Mood and Affect: Mood normal.         Behavior: Behavior normal.         Thought Content:  Thought content normal.                    Isadora Sotelo

## 2023-09-30 LAB
ENA SS-A AB SER-ACNC: <0.2 AI (ref 0–0.9)
ENA SS-B AB SER-ACNC: <0.2 AI (ref 0–0.9)

## 2023-10-02 DIAGNOSIS — R76.8 ANA POSITIVE: Primary | ICD-10-CM

## 2023-10-02 LAB
ANA HOMOGEN SER QL IF: NORMAL
ANA HOMOGEN TITR SER: NORMAL {TITER}
DSDNA AB SER-ACNC: <4 IU/ML
ENA RNP AB SER IA-ACNC: NEGATIVE
ENA SM AB SER IA-ACNC: NEGATIVE
ENA SM+RNP IGG SER-ACNC: NEGATIVE
ENA SS-A AB SER IA-ACNC: NEGATIVE
ENA SS-B AB SER IA-ACNC: NEGATIVE

## 2023-10-03 ENCOUNTER — TELEPHONE (OUTPATIENT)
Dept: FAMILY MEDICINE CLINIC | Facility: MEDICAL CENTER | Age: 28
End: 2023-10-03

## 2023-10-03 NOTE — TELEPHONE ENCOUNTER
----- Message from Kade RainestinghamDO sent at 10/2/2023  4:40 PM EDT -----  PCP out of office. Lab work-up notable for positive TEO which can be present with autoimmune issue. Also given constellation of symptoms reported at last PCP visit would advise consultation with Rheumatology, referral order placed.

## 2023-10-06 LAB — CCP AB SER IA-ACNC: 1.9

## 2023-10-12 ENCOUNTER — OFFICE VISIT (OUTPATIENT)
Dept: RHEUMATOLOGY | Facility: CLINIC | Age: 28
End: 2023-10-12
Payer: COMMERCIAL

## 2023-10-12 ENCOUNTER — APPOINTMENT (OUTPATIENT)
Dept: LAB | Facility: CLINIC | Age: 28
End: 2023-10-12
Payer: COMMERCIAL

## 2023-10-12 ENCOUNTER — OFFICE VISIT (OUTPATIENT)
Dept: FAMILY MEDICINE CLINIC | Facility: MEDICAL CENTER | Age: 28
End: 2023-10-12
Payer: COMMERCIAL

## 2023-10-12 VITALS
OXYGEN SATURATION: 99 % | BODY MASS INDEX: 22.53 KG/M2 | WEIGHT: 135.2 LBS | HEART RATE: 72 BPM | HEIGHT: 65 IN | SYSTOLIC BLOOD PRESSURE: 118 MMHG | DIASTOLIC BLOOD PRESSURE: 64 MMHG

## 2023-10-12 VITALS
BODY MASS INDEX: 22.49 KG/M2 | TEMPERATURE: 99.5 F | RESPIRATION RATE: 16 BRPM | HEIGHT: 65 IN | SYSTOLIC BLOOD PRESSURE: 118 MMHG | WEIGHT: 135 LBS | HEART RATE: 64 BPM | OXYGEN SATURATION: 98 % | DIASTOLIC BLOOD PRESSURE: 64 MMHG

## 2023-10-12 DIAGNOSIS — I73.00 RAYNAUD'S DISEASE WITHOUT GANGRENE: ICD-10-CM

## 2023-10-12 DIAGNOSIS — M35.00 SICCA, UNSPECIFIED TYPE (HCC): ICD-10-CM

## 2023-10-12 DIAGNOSIS — R19.7 DIARRHEA, UNSPECIFIED TYPE: ICD-10-CM

## 2023-10-12 DIAGNOSIS — R76.8 ANA POSITIVE: Primary | ICD-10-CM

## 2023-10-12 DIAGNOSIS — M25.50 ARTHRALGIA, UNSPECIFIED JOINT: ICD-10-CM

## 2023-10-12 DIAGNOSIS — Z83.49 FH: ALPHA 1 ANTITRYPSIN DEFICIENCY: ICD-10-CM

## 2023-10-12 DIAGNOSIS — R53.83 OTHER FATIGUE: ICD-10-CM

## 2023-10-12 DIAGNOSIS — Z11.59 NEED FOR HEPATITIS B SCREENING TEST: ICD-10-CM

## 2023-10-12 DIAGNOSIS — Z00.00 ANNUAL PHYSICAL EXAM: Primary | ICD-10-CM

## 2023-10-12 DIAGNOSIS — K21.9 GASTROESOPHAGEAL REFLUX DISEASE WITHOUT ESOPHAGITIS: ICD-10-CM

## 2023-10-12 LAB
BACTERIA UR QL AUTO: ABNORMAL /HPF
BILIRUB UR QL STRIP: NEGATIVE
C3 SERPL-MCNC: 128 MG/DL (ref 87–200)
C4 SERPL-MCNC: 32 MG/DL (ref 19–52)
CLARITY UR: CLEAR
COLOR UR: ABNORMAL
ERYTHROCYTE [SEDIMENTATION RATE] IN BLOOD: 1 MM/HOUR (ref 0–19)
GLUCOSE UR STRIP-MCNC: NEGATIVE MG/DL
HBV CORE AB SER QL: NORMAL
HBV CORE IGM SER QL: NORMAL
HBV SURFACE AG SER QL: NORMAL
HCV AB SER QL: NORMAL
HGB UR QL STRIP.AUTO: NEGATIVE
KETONES UR STRIP-MCNC: ABNORMAL MG/DL
LEUKOCYTE ESTERASE UR QL STRIP: NEGATIVE
NITRITE UR QL STRIP: NEGATIVE
NON-SQ EPI CELLS URNS QL MICRO: ABNORMAL /HPF
PH UR STRIP.AUTO: 6.5 [PH]
PROT UR STRIP-MCNC: NEGATIVE MG/DL
RBC #/AREA URNS AUTO: ABNORMAL /HPF
SP GR UR STRIP.AUTO: 1.01 (ref 1–1.03)
UROBILINOGEN UR STRIP-ACNC: <2 MG/DL
WBC #/AREA URNS AUTO: ABNORMAL /HPF

## 2023-10-12 PROCEDURE — 85652 RBC SED RATE AUTOMATED: CPT | Performed by: PHYSICIAN ASSISTANT

## 2023-10-12 PROCEDURE — 86160 COMPLEMENT ANTIGEN: CPT | Performed by: PHYSICIAN ASSISTANT

## 2023-10-12 PROCEDURE — 86671 FUNGUS NES ANTIBODY: CPT

## 2023-10-12 PROCEDURE — 99204 OFFICE O/P NEW MOD 45 MIN: CPT | Performed by: PHYSICIAN ASSISTANT

## 2023-10-12 PROCEDURE — 86235 NUCLEAR ANTIGEN ANTIBODY: CPT | Performed by: PHYSICIAN ASSISTANT

## 2023-10-12 PROCEDURE — 87340 HEPATITIS B SURFACE AG IA: CPT | Performed by: PHYSICIAN ASSISTANT

## 2023-10-12 PROCEDURE — 83516 IMMUNOASSAY NONANTIBODY: CPT

## 2023-10-12 PROCEDURE — 82103 ALPHA-1-ANTITRYPSIN TOTAL: CPT

## 2023-10-12 PROCEDURE — 86146 BETA-2 GLYCOPROTEIN ANTIBODY: CPT | Performed by: PHYSICIAN ASSISTANT

## 2023-10-12 PROCEDURE — 86036 ANCA SCREEN EACH ANTIBODY: CPT

## 2023-10-12 PROCEDURE — 81001 URINALYSIS AUTO W/SCOPE: CPT | Performed by: PHYSICIAN ASSISTANT

## 2023-10-12 PROCEDURE — 86803 HEPATITIS C AB TEST: CPT | Performed by: PHYSICIAN ASSISTANT

## 2023-10-12 PROCEDURE — 36415 COLL VENOUS BLD VENIPUNCTURE: CPT

## 2023-10-12 PROCEDURE — 86376 MICROSOMAL ANTIBODY EACH: CPT | Performed by: PHYSICIAN ASSISTANT

## 2023-10-12 PROCEDURE — 99395 PREV VISIT EST AGE 18-39: CPT

## 2023-10-12 PROCEDURE — 86705 HEP B CORE ANTIBODY IGM: CPT | Performed by: PHYSICIAN ASSISTANT

## 2023-10-12 PROCEDURE — 86704 HEP B CORE ANTIBODY TOTAL: CPT | Performed by: PHYSICIAN ASSISTANT

## 2023-10-12 PROCEDURE — 86147 CARDIOLIPIN ANTIBODY EA IG: CPT

## 2023-10-12 PROCEDURE — 86800 THYROGLOBULIN ANTIBODY: CPT | Performed by: PHYSICIAN ASSISTANT
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
## 2023-10-12 NOTE — PROGRESS NOTES
Assessment and Plan:  Ms. Gail Llanos is a 81-year-old female with past medical history significant for iron deficiency anemia and dysmenorrhea who presents for rheumatology evaluation of positive TEO. The patient has been experiencing fatigue, polyarthralgia with stiffness and swelling, intermittent salivary gland swelling, severe dry eye and dry mouth, Raynaud's phenomenon, diarrhea, GERD, and has a family history of SLE in paternal grandmother. Recent labs as ordered by the primary team revealed TEO 1:80 in a speckled pattern and otherwise unremarkable in terms of dsDNA, SSA, SSB, Joseph, RNP, RF, CCP. We discussed today that there is suspicion for underlying autoimmunity in light of the aforementioned complaints. I have placed referral for ENT for lip biopsy to evaluate for potential seronegative Sjogren's and GI referral for c/o diarrhea and GERD. Follow-up in approximately 4 to 6 weeks to review results.     Plan:  Diagnoses and all orders for this visit:    TEO positive  -     Ambulatory Referral to Rheumatology  -     C4 complement  -     C3 complement  -     Urinalysis with microscopic  -     Sedimentation rate, automated  -     Chronic Hepatitis Panel  -     Cancel: Anticardiolipin Ab, IgG/M, Qn  -     Beta-2 glycoprotein antibodies  -     Cancel: Inflammatory Bowel Disease-IBD  -     HLA-B27 antigen  -     Histone Antibody  -     Centromere Antibody  -     Thyroid Antibodies Panel  -     Ambulatory Referral to Otolaryngology  -     Ambulatory Referral to Gastroenterology    FH: alpha 1 antitrypsin deficiency  -     Cancel: Alpha-1-antitrypsin    Sicca, unspecified type ([address])  -     Ambulatory Referral to Otolaryngology    Diarrhea, unspecified type  -     Cancel: Inflammatory Bowel Disease-IBD  -     HLA-B27 antigen  -     Ambulatory Referral to Gastroenterology    Arthralgia, unspecified joint  -     Cancel: Inflammatory Bowel Disease-IBD  -     HLA-B27 antigen  -     Ambulatory Referral to Gastroenterology    Raynaud's disease without gangrene    Gastroesophageal reflux disease without esophagitis  -     Cancel: Inflammatory Bowel Disease-IBD  -     HLA-B27 antigen  -     Ambulatory Referral to Gastroenterology    Other fatigue  -     Thyroid Antibodies Panel    Need for hepatitis B screening test  -     Chronic Hepatitis Panel        I have personally reviewed prior notes, recent laboratory results, and pertinent films in PACS. Activities as tolerated. Exercise: try to maintain a low impact exercise regimen as much as possible. Walk for 30 minutes a day for at least 3 days a week. Continue other medications as prescribed by PCP and other specialists. RTC in 4 to 6 weeks      Follow-up plan: 4-6 wks         Chief Complaint  Chief Complaint   Patient presents with    New Patient Visit     Referred by PCP  TEO positive  Dry eyes and mouth    Fatigue    Joint Swelling    Joint Pain       HPI  Alejandro Quintanilla is a 33 y.o.  female who presents as a Rheumatology consult referred by Hailey Cline DO for evaluation of +TEO. The patient states that she has been having multiple complaints for quite some time now and was recommended by her ophthalmologist to have testing done for Sjogren's. This work-up was ordered by the primary team and then she was referred to us. Pertinent positives: fatigue, polyarthralgia with stiffness (hands, hips, knees - 2-3hrs of AM stiffness. Petrona Lavender occ swelling in hands and knees. .  Ibuprofen helps with pain), intermittent salivary gland swelling, severe dry eye and dry mouth (uses restasis drops), Raynaud's phenomenon (fingers and toes), headaches, diarrhea, GERD, and has a family history of SLE in paternal grandmother.     Denies fevers, unintentional weight loss, alopecia, inflammatory eye disease, persistent/recurrent skin rash, psoriasis, photosensitivity, mouth/nose ulcers, pleuritic chest pain, vomiting, blood in stools, inflammatory bowel disease, blood clots, miscarriages (G0). Review of Systems  Review of Systems  Constitutional: +fatigue. Negative for weight change, fevers, chills, night sweats. ENT/Mouth: Negative for hearing changes, ear pain, nasal congestion, sinus pain, hoarseness, sore throat, rhinorrhea, swallowing difficulty. Eyes: Negative for pain, redness, discharge, vision changes. Cardiovascular: Negative for chest pain, SOB, palpitations. Respiratory: Negative for cough, sputum, wheezing, dyspnea. Gastrointestinal: +diarrhea, pain, heartburn. Negative for nausea, vomiting, constipation  Genitourinary: Negative for dysuria, urinary frequency, hematuria. Musculoskeletal: As per HPI. Skin: +color changes. No skin rash   Neuro: Negative for weakness, numbness, tingling, loss of consciousness. Psych: Negative for anxiety, depression. Heme/Lymph: Negative for easy bruising, bleeding, lymphadenopathy.       Allergies  No Known Allergies    Home Medications    Current Outpatient Medications:     Cyanocobalamin 5000 MCG SUBL, Place under the tongue, Disp: , Rfl:     Fe Bisgly-Vit C-Vit B12-FA 28-60-0.008-0.4 MG CAPS, Take by mouth, Disp: , Rfl:     Ginkgo Biloba (GINKOBA PO), Take by mouth, Disp: , Rfl:     Magnesium Gluconate 550 MG TABS, Take 400 mg by mouth 2 (two) times a day, Disp: , Rfl:     Restasis 0.05 % ophthalmic emulsion, instill 1 drop into both eyes twice a day, Disp: , Rfl:     VITAMIN D PO, Take by mouth, Disp: , Rfl:     Past Medical History  Past Medical History:   Diagnosis Date    ADHD     Anemia     Chlamydia     Migraine     Mononucleosis     Ovarian cyst        Past Surgical History   Past Surgical History:   Procedure Laterality Date    SINUS SURGERY      WISDOM TOOTH EXTRACTION         Family History    Family History   Problem Relation Age of Onset    Hypertension Mother     Hypertension Father     No Known Problems Brother     Hypertension Maternal Grandmother     Cancer Maternal Grandfather         bladder Lupus Paternal Grandmother     Diabetes Paternal Grandmother     Alzheimer's disease Paternal Grandfather      Paternal grandmother with SLE. Social History  Occupation: RN (dialysis)  Social History     Substance and Sexual Activity   Alcohol Use Yes    Alcohol/week: 1.0 standard drink of alcohol    Types: 1 Glasses of wine per week    Comment: socially weekly     Social History     Substance and Sexual Activity   Drug Use Never     Social History     Tobacco Use   Smoking Status Never   Smokeless Tobacco Never       Objective:  Vitals:    10/12/23 1356   BP: 118/64   Pulse: 72   SpO2: 99%   Weight: 61.3 kg (135 lb 3.2 oz)   Height: 5' 5" (1.651 m)       Physical Exam  General: Well appearing, well nourished, in no acute distress. Oriented x 3, normal mood and affect. Ambulating without difficulty. Skin: Good turgor, no rash, unusual bruising or prominent lesions. Hair: Normal texture and distribution. Nails: Normal color, no deformities. HEENT:  Head: Normocephalic, atraumatic. Eyes: Conjunctiva clear, sclera non-icteric, EOM intact. Nose: No external lesions, mucosa non-inflamed. Mouth: Mucous membranes moist, no mucosal lesions. Neck: Supple  Musculoskeletal:   No asymmetry or deformities noted of bilateral upper and lower extremities. No pain or swelling of joints bilaterally to include: shoulder, elbow, wrist, MCP I-V, PIP I-V, knee, ankle, MTP I-V. Neurologic: Alert and oriented. No focal neurological deficits appreciated. Psychiatric: Normal mood and affect. Reviewed labs and imaging. Imaging:   No results found.      Labs:   Office Visit on 10/12/2023   Component Date Value Ref Range Status    C4, COMPLEMENT 10/12/2023 32  19 - 52 mg/dL Final    C3 Complement 10/12/2023 128  87 - 200 mg/dL Final    Color, UA 10/12/2023 Light Yellow   Final    Clarity, UA 10/12/2023 Clear   Final    Specific Gravity, UA 10/12/2023 1.015  1.003 - 1.030 Final    pH, UA 10/12/2023 6.5  4.5, 5.0, 5.5, 6. 0, 6.5, 7.0, 7.5, 8.0 Final    Leukocytes, UA 10/12/2023 Negative  Negative Final    Nitrite, UA 10/12/2023 Negative  Negative Final    Protein, UA 10/12/2023 Negative  Negative mg/dl Final    Glucose, UA 10/12/2023 Negative  Negative mg/dl Final    Ketones, UA 10/12/2023 20 (1+) (A)  Negative mg/dl Final    Urobilinogen, UA 10/12/2023 <2.0  <2.0 mg/dl mg/dl Final    Bilirubin, UA 10/12/2023 Negative  Negative Final    Occult Blood, UA 10/12/2023 Negative  Negative Final    RBC, UA 10/12/2023 1-2  None Seen, 1-2 /hpf Final    WBC, UA 10/12/2023 1-2  None Seen, 1-2 /hpf Final    Epithelial Cells 10/12/2023 Occasional  None Seen, Occasional /hpf Final    Bacteria, UA 10/12/2023 Occasional  None Seen, Occasional /hpf Final    Sed Rate 10/12/2023 1  0 - 19 mm/hour Final    Hepatitis B Surface Ag 10/12/2023 Non-reactive  Non-Reactive Final    Hepatitis C Ab 10/12/2023 Non-reactive  Non-Reactive Final    Hep B C IgM 10/12/2023 Non-reactive  Non-Reactive Final    Hep B Core Total Ab 10/12/2023 Non-reactive  Non-Reactive Final    Thyroglobulin Ab 10/12/2023 2.0 (H)  0.0 - 0.9 IU/mL Final    Thyroglobulin Antibody measured by [facility]   Appointment on 09/29/2023   Component Date Value Ref Range Status    SS-A (RO) Ab 09/29/2023 <0.2  0.0 - 0.9 AI Final    SS-B (LA) Ab 09/29/2023 <0.2  0.0 - 0.9 AI Final    CRP 09/29/2023 <1.0  <3.0 mg/L Final    Rheumatoid Factor 09/29/2023 Negative  Negative Final    Cyclic Citrullinated Peptide Ab  09/29/2023 1.9  See comment Final    TEO 09/29/2023 Positive (A)  Negative Final    WBC 09/29/2023 4.56  4.31 - 10.16 Thousand/uL Final    RBC 09/29/2023 4.61  3.81 - 5.12 Million/uL Final    Hemoglobin 09/29/2023 13.7  11.5 - 15.4 g/dL Final    Hematocrit 09/29/2023 41.2  34.8 - 46.1 % Final    MCV 09/29/2023 89  82 - 98 fL Final    MCH 09/29/2023 29.7  26.8 - 34.3 pg Final    MCHC 09/29/2023 33.3  31.4 - 37.4 g/dL Final    RDW 09/29/2023 12.1  11.6 - 15.1 % Final    MPV 09/29/2023 9.9  8.9 - 12.7 fL Final    Platelets 57/96/9386 234  149 - 390 Thousands/uL Final    nRBC 09/29/2023 0  /100 WBCs Final    Neutrophils Relative 09/29/2023 57  43 - 75 % Final    Immat GRANS % 09/29/2023 0  0 - 2 % Final    Lymphocytes Relative 09/29/2023 34  14 - 44 % Final    Monocytes Relative 09/29/2023 7  4 - 12 % Final    Eosinophils Relative 09/29/2023 1  0 - 6 % Final    Basophils Relative 09/29/2023 1  0 - 1 % Final    Neutrophils Absolute 09/29/2023 2.59  1.85 - 7.62 Thousands/µL Final    Immature Grans Absolute 09/29/2023 0.01  0.00 - 0.20 Thousand/uL Final    Lymphocytes Absolute 09/29/2023 1.56  0.60 - 4.47 Thousands/µL Final    Monocytes Absolute 09/29/2023 0.31  0.17 - 1.22 Thousand/µL Final    Eosinophils Absolute 09/29/2023 0.06  0.00 - 0.61 Thousand/µL Final    Basophils Absolute 09/29/2023 0.03  0.00 - 0.10 Thousands/µL Final    Sodium 09/29/2023 140  135 - 147 mmol/L Final    Potassium 09/29/2023 4.1  3.5 - 5.3 mmol/L Final    Chloride 09/29/2023 104  96 - 108 mmol/L Final    CO2 09/29/2023 28  21 - 32 mmol/L Final    ANION GAP 09/29/2023 8  mmol/L Final    BUN 09/29/2023 11  5 - 25 mg/dL Final    Creatinine 09/29/2023 0.85  0.60 - 1.30 mg/dL Final    Standardized to IDMS reference method    Glucose, Fasting 09/29/2023 89  65 - 99 mg/dL Final    Calcium 09/29/2023 9.3  8.4 - 10.2 mg/dL Final    AST 09/29/2023 12 (L)  13 - 39 U/L Final    ALT 09/29/2023 10  7 - 52 U/L Final    Specimen collection should occur prior to Sulfasalazine administration due to the potential for falsely depressed results. Alkaline Phosphatase 09/29/2023 51  34 - 104 U/L Final    Total Protein 09/29/2023 7.5  6.4 - 8.4 g/dL Final    Albumin 09/29/2023 4.8  3.5 - 5.0 g/dL Final    Total Bilirubin 09/29/2023 0.63  0.20 - 1.00 mg/dL Final    Use of this assay is not recommended for patients undergoing treatment with eltrombopag due to the potential for falsely elevated results.   N-acetyl-p-benzoquinone imine (metabolite of Acetaminophen) will generate erroneously low results in samples for patients that have taken an overdose of Acetaminophen. eGFR 09/29/2023 94  ml/min/1.73sq m Final    Hemoglobin A1C 09/29/2023 5.2  Normal 4.0-5.6%; PreDiabetic 5.7-6.4%; Diabetic >=6.5%; Glycemic control for adults with diabetes <7.0% % Final    EAG 09/29/2023 103  mg/dl Final    Cholesterol 09/29/2023 139  See Comment mg/dL Final    Cholesterol:         Pediatric <18 Years        Desirable          <170 mg/dL      Borderline High    170-199 mg/dL      High               >=200 mg/dL        Adult >=18 Years            Desirable         <200 mg/dL      Borderline High   200-239 mg/dL      High              >239 mg/dL      Triglycerides 09/29/2023 44  See Comment mg/dL Final    Triglyceride:     0-9Y            <75mg/dL     10Y-17Y         <90 mg/dL       >=18Y     Normal          <150 mg/dL     Borderline High 150-199 mg/dL     High            200-499 mg/dL        Very High       >499 mg/dL    Specimen collection should occur prior to Metamizole administration due to the potential for falsely depressed results. HDL, Direct 09/29/2023 65  >=50 mg/dL Final    LDL Calculated 09/29/2023 65  0 - 100 mg/dL Final    LDL Cholesterol:     Optimal           <100 mg/dl     Near Optimal      100-129 mg/dl     Above Optimal       Borderline High 130-159 mg/dl       High            160-189 mg/dl       Very High       >189 mg/dl         This screening LDL is a calculated result. It does not have the accuracy of the Direct Measured LDL in the monitoring of patients with hyperlipidemia and/or statin therapy. Direct Measure LDL (FEH438) must be ordered separately in these patients.     Non-HDL-Chol (CHOL-HDL) 09/29/2023 74  mg/dl Final    Lyme Total Antibodies 09/29/2023 Negative  Negative Final    TEO Titer 1 09/29/2023 Titer of 80   Final    TEO Pattern 1 09/29/2023 Speckled pattern   Final    Anti-dsDNA Quant 09/29/2023 <4  4 - 10 IU/mL Final Negative      <=4  IU/mL  Indeterminate 5-9  IU/mL  Positive      >=10 IU/mL    Sjogren's Anti-SS-A 09/29/2023 Negative  Negative Final    Sjogren's Anti-SS-B 09/29/2023 Negative  Negative Final    Joseph Antibodies 09/29/2023 Negative  Negative Final    Joseph/RNP Antibodies 09/29/2023 Negative  Negative Final    RNP Antibodies 09/29/2023 Negative  Negative Final   Appointment on 06/29/2023   Component Date Value Ref Range Status    TSH 3RD GENERATON 06/29/2023 3. 211  0.450 - 4.500 uIU/mL Final    The recommended reference ranges for TSH during pregnancy are as follows:   First trimester 0.1 to 2.5 uIU/mL   Second trimester  0.2 to 3.0 uIU/mL   Third trimester 0.3 to 3.0 uIU/m    Note: Normal ranges may not apply to patients who are transgender, non-binary, or whose legal sex, sex at birth, and gender identity differ. Adult TSH (3rd generation) reference range follows the recommended guidelines of the American Thyroid Association, January, 2020. [facility] 06/29/2023 6.3  See Comment mIU/mL Final    FSH:   Menstruating Females:     Mid-Follicular Phase  0.5-2.6 mIU/mL     Mid-Cycle Phase       4.5-22.5 mIU/mL     Mid-Luteal Phase      1.8-5.1  mIU/mL   Postmenopausal Females     Post Menopausal       16.7-113.6   mIU/mL         Note: Normal ranges may not apply to patients who are transgender, non-binary, or whose legal sex, sex at birth, and gender identity differ. [facility] 06/29/2023 8.1  See Comment mIU/mL Final    LH:     Mid-Follicular Phase  3.9-93.3  mIU/mL     Mid-Cycle Peak        19.2-103.0 mIU/mL     Mid-Luteal Phase      1.2-12.9  mIU/mL     Post Menopausal       10.9-58.6 mIU/mL    Note: Normal ranges may not apply to patients who are transgender, non-binary, or whose legal sex, sex at birth, and gender identity differ.     Prolactin 06/29/2023 20.83  3.34 - 26.72 ng/mL Final    PROLACTIN:     Note: Normal ranges may not apply to patients who are transgender, non-binary, or whose legal sex, sex at birth, and gender identity differ. Testosterone, Free 06/29/2023 2.2  0.0 - 4.2 pg/mL Final    TESTOSTERONE TOTAL 06/29/2023 40  13 - 71 ng/dL Final    HIV-1 p24 Antigen 06/29/2023 Non-Reactive  Non-Reactive Final    HIV-1 Antibody 06/29/2023 Non-Reactive  Non-Reactive Final    HIV-2 Antibody 06/29/2023 Non-Reactive  Non-Reactive Final    HIV Ag-Ab 5th Gen 06/29/2023 Non-Reactive  Non-Reactive Final    A Non-Reactive test result does not preclude the possibility of exposure or infection with HIV-1 and/or HIV-2. Non-Reactive results can occur if the quantity of marker present is below the detection limits or is not present during the stage of disease in which a sample is collected. Repeat testing should be considered where there is clinical suspicion of infection. Syphilis Total Antibody 06/29/2023 Non-reactive  Non-Reactive Final    No serological evidence of infection with T. pallidum. Early or incubating syphilis infection cannot be excluded. Consider repeat testing based on clinical suspicion.     Hepatitis B Surface Ag 06/29/2023 Non-reactive  Non-Reactive Final   Annual Exam on 06/26/2023   Component Date Value Ref Range Status    N gonorrhoeae, DNA Probe 06/26/2023 Negative  Negative Final    Chlamydia trachomatis, DNA Probe 06/26/2023 Negative  Negative Final         Ivy Mack PA-C  Rheumatology

## 2023-10-12 NOTE — PATIENT INSTRUCTIONS
Wellness Visit for Adults   AMBULATORY CARE:   A wellness visit  is when you see your healthcare provider to get screened for health problems. Your healthcare provider will also give you advice on how to stay healthy. Write down your questions so you remember to ask them. Ask your healthcare provider how often you should have a wellness visit. What happens at a wellness visit:  Your healthcare provider will ask about your health, and your family history of health problems. This includes high blood pressure, heart disease, and cancer. He or she will ask if you have symptoms that concern you, if you smoke, and about your mood. You may also be asked about your intake of medicines, supplements, food, and alcohol. Any of the following may be done: Your weight  will be checked. Your height may also be checked so your body mass index (BMI) can be calculated. Your BMI shows if you are at a healthy weight. Your blood pressure  and heart rate will be checked. Your temperature may also be checked. Blood and urine tests  may be done. Blood tests may be done to check your cholesterol levels. Abnormal cholesterol levels increase your risk for heart disease and stroke. You may also need a blood or urine test to check for diabetes if you are at increased risk. Urine tests may be done to look for signs of an infection or kidney disease. A physical exam  includes checking your heartbeat and lungs with a stethoscope. Your healthcare provider may also check your skin to look for sun damage. Screening tests  may be recommended. A screening test is done to check for diseases that may not cause symptoms. The screening tests you may need depend on your age, gender, family history, and lifestyle habits. For example, colorectal screening may be recommended if you are 48years old or older. Screening tests you need if you are a woman:   A Pap smear  is used to screen for cervical cancer.  Pap smears are usually done every 3 to 5 years depending on your age. You may need them more often if you have had abnormal Pap smear test results in the past. Ask your healthcare provider how often you should have a Pap smear. A mammogram  is an x-ray of your breasts to screen for breast cancer. Experts recommend mammograms every 2 years starting at age 48 years. You may need a mammogram at age 52 years or younger if you have an increased risk for breast cancer. Talk to your healthcare provider about when you should start having mammograms and how often you need them. Vaccines you may need:   Get an influenza vaccine  every year. The influenza vaccine protects you from the flu. Several types of viruses cause the flu. The viruses change over time, so new vaccines are made each year. Get a tetanus-diphtheria (Td) booster vaccine  every 10 years. This vaccine protects you against tetanus and diphtheria. Tetanus is a severe infection that may cause painful muscle spasms and lockjaw. Diphtheria is a severe bacterial infection that causes a thick covering in the back of your mouth and throat. Get a human papillomavirus (HPV) vaccine  if you are female and aged 23 to 32 or male 23 to 24 and never received it. This vaccine protects you from HPV infection. HPV is the most common infection spread by sexual contact. HPV may also cause vaginal, penile, and anal cancers. Get a pneumococcal vaccine  if you are aged 72 years or older. The pneumococcal vaccine is an injection given to protect you from pneumococcal disease. Pneumococcal disease is an infection caused by pneumococcal bacteria. The infection may cause pneumonia, meningitis, or an ear infection. Get a shingles vaccine  if you are 60 or older, even if you have had shingles before. The shingles vaccine is an injection to protect you from the varicella-zoster virus. This is the same virus that causes chickenpox.  Shingles is a painful rash that develops in people who had chickenpox or have been exposed to the virus. How to eat healthy:  My Plate is a model for planning healthy meals. It shows the types and amounts of foods that should go on your plate. Fruits and vegetables make up about half of your plate, and grains and protein make up the other half. A serving of dairy is included on the side of your plate. The amount of calories and serving sizes you need depends on your age, gender, weight, and height. Examples of healthy foods are listed below:  Eat a variety of vegetables  such as dark green, red, and orange vegetables. You can also include canned vegetables low in sodium (salt) and frozen vegetables without added butter or sauces. Eat a variety of fresh fruits , canned fruit in 100% juice, frozen fruit, and dried fruit. Include whole grains. At least half of the grains you eat should be whole grains. Examples include whole-wheat bread, wheat pasta, brown rice, and whole-grain cereals such as oatmeal.    Eat a variety of protein foods such as seafood (fish and shellfish), lean meat, and poultry without skin (turkey and chicken). Examples of lean meats include pork leg, shoulder, or tenderloin, and beef round, sirloin, tenderloin, and extra lean ground beef. Other protein foods include eggs and egg substitutes, beans, peas, soy products, nuts, and seeds. Choose low-fat dairy products such as skim or 1% milk or low-fat yogurt, cheese, and cottage cheese. Limit unhealthy fats  such as butter, hard margarine, and shortening. Exercise:  Exercise at least 30 minutes per day on most days of the week. Some examples of exercise include walking, biking, dancing, and swimming. You can also fit in more physical activity by taking the stairs instead of the elevator or parking farther away from stores. Include muscle strengthening activities 2 days each week. Regular exercise provides many health benefits.  It helps you manage your weight, and decreases your risk for type 2 diabetes, heart disease, stroke, and high blood pressure. Exercise can also help improve your mood. Ask your healthcare provider about the best exercise plan for you. General health and safety guidelines:   Do not smoke. Nicotine and other chemicals in cigarettes and cigars can cause lung damage. Ask your healthcare provider for information if you currently smoke and need help to quit. E-cigarettes or smokeless tobacco still contain nicotine. Talk to your healthcare provider before you use these products. Limit alcohol. A drink of alcohol is 12 ounces of beer, 5 ounces of wine, or 1½ ounces of liquor. Lose weight, if needed. Being overweight increases your risk of certain health conditions. These include heart disease, high blood pressure, type 2 diabetes, and certain types of cancer. Protect your skin. Do not sunbathe or use tanning beds. Use sunscreen with a SPF 15 or higher. Apply sunscreen at least 15 minutes before you go outside. Reapply sunscreen every 2 hours. Wear protective clothing, hats, and sunglasses when you are outside. Drive safely. Always wear your seatbelt. Make sure everyone in your car wears a seatbelt. A seatbelt can save your life if you are in an accident. Do not use your cell phone when you are driving. This could distract you and cause an accident. Pull over if you need to make a call or send a text message. Practice safe sex. Use latex condoms if are sexually active and have more than one partner. Your healthcare provider may recommend screening tests for sexually transmitted infections (STIs). Wear helmets, lifejackets, and protective gear. Always wear a helmet when you ride a bike or motorcycle, go skiing, or play sports that could cause a head injury. Wear protective equipment when you play sports. Wear a lifejacket when you are on a boat or doing water sports.     © Copyright Luisito Delgadillo 2023 Information is for End User's use only and may not be sold, redistributed or otherwise used for commercial purposes. The above information is an  only. It is not intended as medical advice for individual conditions or treatments. Talk to your doctor, nurse or pharmacist before following any medical regimen to see if it is safe and effective for you.

## 2023-10-13 ENCOUNTER — APPOINTMENT (OUTPATIENT)
Dept: LAB | Facility: CLINIC | Age: 28
End: 2023-10-13
Payer: COMMERCIAL

## 2023-10-13 ENCOUNTER — TELEPHONE (OUTPATIENT)
Dept: RHEUMATOLOGY | Facility: CLINIC | Age: 28
End: 2023-10-13

## 2023-10-13 ENCOUNTER — APPOINTMENT (OUTPATIENT)
Dept: LAB | Facility: MEDICAL CENTER | Age: 28
End: 2023-10-13
Payer: COMMERCIAL

## 2023-10-13 DIAGNOSIS — R76.8 ANA POSITIVE: ICD-10-CM

## 2023-10-13 DIAGNOSIS — R53.83 OTHER FATIGUE: ICD-10-CM

## 2023-10-13 DIAGNOSIS — K21.9 GASTROESOPHAGEAL REFLUX DISEASE WITHOUT ESOPHAGITIS: ICD-10-CM

## 2023-10-13 DIAGNOSIS — M25.50 ARTHRALGIA, UNSPECIFIED JOINT: ICD-10-CM

## 2023-10-13 DIAGNOSIS — I73.00 RAYNAUD'S DISEASE WITHOUT GANGRENE: ICD-10-CM

## 2023-10-13 DIAGNOSIS — K22.6: ICD-10-CM

## 2023-10-13 DIAGNOSIS — R76.8 CENTROMERE ANTIBODY POSITIVE: ICD-10-CM

## 2023-10-13 DIAGNOSIS — R76.8 CENTROMERE ANTIBODY POSITIVE: Primary | ICD-10-CM

## 2023-10-13 DIAGNOSIS — M35.00 SICCA, UNSPECIFIED TYPE (HCC): ICD-10-CM

## 2023-10-13 LAB
A1AT SERPL-MCNC: 132 MG/DL (ref 100–188)
B2 GLYCOPROT1 IGA SERPL IA-ACNC: 1
B2 GLYCOPROT1 IGG SERPL IA-ACNC: 0.9
B2 GLYCOPROT1 IGM SERPL IA-ACNC: <2.4
CENTROMERE B AB SER-ACNC: 1.2 AI (ref 0–0.9)
THYROGLOB AB SERPL-ACNC: 2 IU/ML (ref 0–0.9)
THYROPEROXIDASE AB SERPL-ACNC: 13 IU/ML (ref 0–34)

## 2023-10-13 PROCEDURE — 86381 MITOCHONDRIAL ANTIBODY EACH: CPT

## 2023-10-13 PROCEDURE — 36415 COLL VENOUS BLD VENIPUNCTURE: CPT

## 2023-10-13 PROCEDURE — 83516 IMMUNOASSAY NONANTIBODY: CPT

## 2023-10-13 PROCEDURE — 86235 NUCLEAR ANTIGEN ANTIBODY: CPT

## 2023-10-14 LAB
ENA SCL70 AB SER-ACNC: <0.2 AI (ref 0–0.9)
MITOCHONDRIA M2 IGG SER-ACNC: <20 UNITS (ref 0–20)

## 2023-10-17 LAB
CARDIOLIPIN IGA SER IA-ACNC: 2.4
CARDIOLIPIN IGG SER IA-ACNC: 1.5
CARDIOLIPIN IGM SER IA-ACNC: 3.6
HISTONE IGG SER IA-ACNC: 0.5 UNITS (ref 0–0.9)

## 2023-10-18 LAB
BAKER'S YEAST IGG QN IA: 70 UNITS (ref 0–50)
CHITOBIOSIDE IGA SERPL IA-ACNC: 14 UNITS (ref 0–90)
IBD COMMENTS: ABNORMAL
LAMINARIBIOSIDE IGG SERPL IA-ACNC: 34 UNITS (ref 0–60)
MANNOBIOSIDE IGG SERPL IA-ACNC: 19 UNITS (ref 0–100)
P-ANCA ATYPICAL SER QL IF: NEGATIVE

## 2023-10-19 LAB — HLA-B27 QL NAA+PROBE: NEGATIVE

## 2023-10-23 LAB — MISCELLANEOUS LAB TEST RESULT: NORMAL

## 2023-10-24 LAB — MISCELLANEOUS LAB TEST RESULT: NORMAL

## 2023-10-30 LAB — MISCELLANEOUS LAB TEST RESULT: NORMAL

## 2023-10-31 ENCOUNTER — TELEPHONE (OUTPATIENT)
Dept: OBGYN CLINIC | Facility: CLINIC | Age: 28
End: 2023-10-31

## 2023-10-31 DIAGNOSIS — N93.9 ABNORMAL UTERINE BLEEDING (AUB): Primary | ICD-10-CM

## 2023-10-31 PROBLEM — K11.7 XEROSTOMIA: Status: ACTIVE | Noted: 2023-10-31

## 2023-10-31 PROCEDURE — 88305 TISSUE EXAM BY PATHOLOGIST: CPT | Performed by: STUDENT IN AN ORGANIZED HEALTH CARE EDUCATION/TRAINING PROGRAM

## 2023-11-03 ENCOUNTER — HOSPITAL ENCOUNTER (OUTPATIENT)
Dept: ULTRASOUND IMAGING | Facility: HOSPITAL | Age: 28
End: 2023-11-03
Attending: STUDENT IN AN ORGANIZED HEALTH CARE EDUCATION/TRAINING PROGRAM
Payer: COMMERCIAL

## 2023-11-03 DIAGNOSIS — N93.9 ABNORMAL UTERINE BLEEDING (AUB): ICD-10-CM

## 2023-11-03 PROCEDURE — 76856 US EXAM PELVIC COMPLETE: CPT

## 2023-11-03 PROCEDURE — 76830 TRANSVAGINAL US NON-OB: CPT

## 2023-11-04 ENCOUNTER — APPOINTMENT (EMERGENCY)
Dept: CT IMAGING | Facility: HOSPITAL | Age: 28
End: 2023-11-04
Payer: COMMERCIAL

## 2023-11-04 ENCOUNTER — HOSPITAL ENCOUNTER (EMERGENCY)
Facility: HOSPITAL | Age: 28
Discharge: HOME | End: 2023-11-04
Attending: EMERGENCY MEDICINE
Payer: COMMERCIAL

## 2023-11-04 VITALS
SYSTOLIC BLOOD PRESSURE: 124 MMHG | WEIGHT: 132.5 LBS | HEART RATE: 67 BPM | TEMPERATURE: 98 F | BODY MASS INDEX: 22.05 KG/M2 | RESPIRATION RATE: 16 BRPM | DIASTOLIC BLOOD PRESSURE: 59 MMHG | OXYGEN SATURATION: 100 %

## 2023-11-04 DIAGNOSIS — R10.9 ABDOMINAL PAIN: ICD-10-CM

## 2023-11-04 DIAGNOSIS — K62.5 RECTAL BLEEDING: Primary | ICD-10-CM

## 2023-11-04 LAB
ALBUMIN SERPL BCP-MCNC: 4.9 G/DL (ref 3.5–5)
ALP SERPL-CCNC: 53 U/L (ref 34–104)
ALT SERPL W P-5'-P-CCNC: 7 U/L (ref 7–52)
ANION GAP SERPL CALCULATED.3IONS-SCNC: 8 MMOL/L
APTT PPP: 30 SECONDS (ref 23–37)
AST SERPL W P-5'-P-CCNC: 11 U/L (ref 13–39)
BASOPHILS # BLD AUTO: 0.03 THOUSANDS/ÂΜL (ref 0–0.1)
BASOPHILS NFR BLD AUTO: 1 % (ref 0–1)
BILIRUB SERPL-MCNC: 0.57 MG/DL (ref 0.2–1)
BILIRUB UR QL STRIP: NEGATIVE
BUN SERPL-MCNC: 5 MG/DL (ref 5–25)
CALCIUM SERPL-MCNC: 9.4 MG/DL (ref 8.4–10.2)
CHLORIDE SERPL-SCNC: 105 MMOL/L (ref 96–108)
CLARITY UR: CLEAR
CO2 SERPL-SCNC: 28 MMOL/L (ref 21–32)
COLOR UR: COLORLESS
CREAT SERPL-MCNC: 0.79 MG/DL (ref 0.6–1.3)
EOSINOPHIL # BLD AUTO: 0.02 THOUSAND/ÂΜL (ref 0–0.61)
EOSINOPHIL NFR BLD AUTO: 0 % (ref 0–6)
ERYTHROCYTE [DISTWIDTH] IN BLOOD BY AUTOMATED COUNT: 12.2 % (ref 11.6–15.1)
EXT PREGNANCY TEST URINE: NEGATIVE
EXT. CONTROL: NORMAL
GFR SERPL CREATININE-BSD FRML MDRD: 102 ML/MIN/1.73SQ M
GLUCOSE SERPL-MCNC: 90 MG/DL (ref 65–140)
GLUCOSE UR STRIP-MCNC: NEGATIVE MG/DL
HCT VFR BLD AUTO: 36.3 % (ref 34.8–46.1)
HGB BLD-MCNC: 12.4 G/DL (ref 11.5–15.4)
HGB UR QL STRIP.AUTO: NEGATIVE
IMM GRANULOCYTES # BLD AUTO: 0 THOUSAND/UL (ref 0–0.2)
IMM GRANULOCYTES NFR BLD AUTO: 0 % (ref 0–2)
INR PPP: 1.1 (ref 0.84–1.19)
KETONES UR STRIP-MCNC: ABNORMAL MG/DL
LEUKOCYTE ESTERASE UR QL STRIP: NEGATIVE
LIPASE SERPL-CCNC: 19 U/L (ref 11–82)
LYMPHOCYTES # BLD AUTO: 1.87 THOUSANDS/ÂΜL (ref 0.6–4.47)
LYMPHOCYTES NFR BLD AUTO: 28 % (ref 14–44)
MCH RBC QN AUTO: 30 PG (ref 26.8–34.3)
MCHC RBC AUTO-ENTMCNC: 34.2 G/DL (ref 31.4–37.4)
MCV RBC AUTO: 88 FL (ref 82–98)
MONOCYTES # BLD AUTO: 0.39 THOUSAND/ÂΜL (ref 0.17–1.22)
MONOCYTES NFR BLD AUTO: 6 % (ref 4–12)
NEUTROPHILS # BLD AUTO: 4.27 THOUSANDS/ÂΜL (ref 1.85–7.62)
NEUTS SEG NFR BLD AUTO: 65 % (ref 43–75)
NITRITE UR QL STRIP: NEGATIVE
NRBC BLD AUTO-RTO: 0 /100 WBCS
PH UR STRIP.AUTO: 6 [PH]
PLATELET # BLD AUTO: 247 THOUSANDS/UL (ref 149–390)
PMV BLD AUTO: 9.4 FL (ref 8.9–12.7)
POTASSIUM SERPL-SCNC: 3.6 MMOL/L (ref 3.5–5.3)
PROT SERPL-MCNC: 7.2 G/DL (ref 6.4–8.4)
PROT UR STRIP-MCNC: NEGATIVE MG/DL
PROTHROMBIN TIME: 14.8 SECONDS (ref 11.6–14.5)
RBC # BLD AUTO: 4.14 MILLION/UL (ref 3.81–5.12)
SODIUM SERPL-SCNC: 141 MMOL/L (ref 135–147)
SP GR UR STRIP.AUTO: 1 (ref 1–1.03)
UROBILINOGEN UR STRIP-ACNC: <2 MG/DL
WBC # BLD AUTO: 6.58 THOUSAND/UL (ref 4.31–10.16)

## 2023-11-04 PROCEDURE — G1004 CDSM NDSC: HCPCS

## 2023-11-04 PROCEDURE — 99284 EMERGENCY DEPT VISIT MOD MDM: CPT | Performed by: EMERGENCY MEDICINE

## 2023-11-04 PROCEDURE — 81003 URINALYSIS AUTO W/O SCOPE: CPT

## 2023-11-04 PROCEDURE — 85025 COMPLETE CBC W/AUTO DIFF WBC: CPT

## 2023-11-04 PROCEDURE — 85730 THROMBOPLASTIN TIME PARTIAL: CPT

## 2023-11-04 PROCEDURE — 81025 URINE PREGNANCY TEST: CPT

## 2023-11-04 PROCEDURE — 36415 COLL VENOUS BLD VENIPUNCTURE: CPT

## 2023-11-04 PROCEDURE — 85610 PROTHROMBIN TIME: CPT

## 2023-11-04 PROCEDURE — 80053 COMPREHEN METABOLIC PANEL: CPT

## 2023-11-04 PROCEDURE — 99285 EMERGENCY DEPT VISIT HI MDM: CPT

## 2023-11-04 PROCEDURE — 74176 CT ABD & PELVIS W/O CONTRAST: CPT

## 2023-11-04 PROCEDURE — 83690 ASSAY OF LIPASE: CPT

## 2023-11-04 RX ORDER — ACETAMINOPHEN 325 MG/1
975 TABLET ORAL ONCE
Status: COMPLETED | OUTPATIENT
Start: 2023-11-04 | End: 2023-11-04

## 2023-11-04 RX ADMIN — ACETAMINOPHEN 975 MG: 325 TABLET, FILM COATED ORAL at 17:16

## 2023-11-04 NOTE — ED PROVIDER NOTES
History  Chief Complaint   Patient presents with    Rectal Bleeding     This morning pt states blood clots came out of her rectum, having abdominal pain and lower back pain. Pt got a intravaginal ultrasound yesterday. 28-year-old female with past medical history of migraine and ADHD presents with blood in stool and abdominal pain. Patient had an episode of dark maroon blood clots not mixed with stool this morning, also had 3 diarrheal episodes since yesterday. Complains of dull constant suprapubic abdominal pain radiating to the lower back associated with nausea, but no vomiting. No fever, chills, chest pain, shortness of breath, dysuria, urinary frequency. LMP 10/14. Recent travel to [facility] last month. No sick contacts. She chronically have diarrhea and had a recent abnormal IBD panel scheduled with GI follow up. Prior to Admission Medications   Prescriptions Last Dose Informant Patient Reported? Taking?    Cyanocobalamin 5000 MCG SUBL  Self Yes No   Sig: Place under the tongue   Fe Bisgly-Vit C-Vit B12-FA 28-60-0.008-0.4 MG CAPS  Self Yes No   Sig: Take by mouth   Ginkgo Biloba (GINKOBA PO)  Self Yes No   Sig: Take by mouth   Magnesium Gluconate 550 MG TABS  Self Yes No   Sig: Take 400 mg by mouth 2 (two) times a day   Restasis 0.05 % ophthalmic emulsion  Self Yes No   Sig: instill 1 drop into both eyes twice a day   VITAMIN D PO  Self Yes No   Sig: Take by mouth      Facility-Administered Medications: None       Past Medical History:   Diagnosis Date    ADHD     Anemia     Chlamydia     Migraine     Mononucleosis     Ovarian cyst        Past Surgical History:   Procedure Laterality Date    SINUS SURGERY      WISDOM TOOTH EXTRACTION         Family History   Problem Relation Age of Onset    Hypertension Mother     Hypertension Father     No Known Problems Brother     Hypertension Maternal Grandmother     Cancer Maternal Grandfather         bladder    Lupus Paternal Grandmother     Diabetes Paternal Grandmother     Alzheimer's disease Paternal Grandfather      I have reviewed and agree with the history as documented. E-Cigarette/Vaping    E-Cigarette Use Never User      E-Cigarette/Vaping Substances    Nicotine No     THC No     CBD No     Flavoring No     Other No     Unknown No      Social History     Tobacco Use    Smoking status: Never    Smokeless tobacco: Never   Vaping Use    Vaping Use: Never used   Substance Use Topics    Alcohol use: Yes     Alcohol/week: 1.0 standard drink of alcohol     Types: 1 Glasses of wine per week     Comment: socially weekly    Drug use: Never        Review of Systems   Constitutional:  Negative for chills, fatigue and fever. Respiratory:  Negative for choking and chest tightness. Cardiovascular:  Negative for chest pain and leg swelling. Gastrointestinal:  Positive for abdominal pain, blood in stool, diarrhea and nausea. Negative for abdominal distention and vomiting. Genitourinary:  Negative for dysuria and frequency. Musculoskeletal:  Positive for back pain. Neurological:  Negative for dizziness, tremors, syncope and light-headedness. Physical Exam  ED Triage Vitals   Temperature Pulse Respirations Blood Pressure SpO2   11/04/23 1441 11/04/23 1441 11/04/23 1441 11/04/23 1442 11/04/23 1441   98 °F (36.7 °C) 65 16 140/80 100 %      Temp Source Heart Rate Source Patient Position - Orthostatic VS BP Location FiO2 (%)   11/04/23 1441 11/04/23 1441 -- 11/04/23 1441 --   Oral Monitor  Right arm       Pain Score       --                    Orthostatic Vital Signs  Vitals:    11/04/23 1441 11/04/23 1442   BP:  140/80   Pulse: 65        Physical Exam  HENT:      Head: Normocephalic and atraumatic. Nose: Nose normal.   Eyes:      Extraocular Movements: Extraocular movements intact. Cardiovascular:      Rate and Rhythm: Normal rate and regular rhythm. Pulmonary:      Effort: Pulmonary effort is normal.      Breath sounds: Normal breath sounds. Abdominal:      General: Abdomen is flat. Palpations: Abdomen is soft. Tenderness: There is abdominal tenderness in the suprapubic area and left lower quadrant. Musculoskeletal:         General: Normal range of motion. Cervical back: Normal range of motion. Skin:     General: Skin is warm. Neurological:      Mental Status: She is alert.          ED Medications  Medications - No data to display    Diagnostic Studies  Results Reviewed       Procedure Component Value Units Date/Time    POCT pregnancy, urine [[id number]]  (Normal) Resulted: 11/04/23 1616    Lab Status: Final result Updated: 11/04/23 1616     EXT Preg Test, Ur Negative     Control Valid    Comprehensive metabolic panel [[id number]]  (Abnormal) Collected: 11/04/23 1543    Lab Status: Final result Specimen: Blood from Arm, Right Updated: 11/04/23 1612     Sodium 141 mmol/L      Potassium 3.6 mmol/L      Chloride 105 mmol/L      CO2 28 mmol/L      ANION GAP 8 mmol/L      BUN 5 mg/dL      Creatinine 0.79 mg/dL      Glucose 90 mg/dL      Calcium 9.4 mg/dL      AST 11 U/L      ALT 7 U/L      Alkaline Phosphatase 53 U/L      Total Protein 7.2 g/dL      Albumin 4.9 g/dL      Total Bilirubin 0.57 mg/dL      eGFR 102 ml/min/1.73sq m     Narrative:      [place] guidelines for Chronic Kidney Disease (CKD):     Stage 1 with normal or high GFR (GFR > 90 mL/min/1.73 square meters)    Stage 2 Mild CKD (GFR = 60-89 mL/min/1.73 square meters)    Stage 3A Moderate CKD (GFR = 45-59 mL/min/1.73 square meters)    Stage 3B Moderate CKD (GFR = 30-44 mL/min/1.73 square meters)    Stage 4 Severe CKD (GFR = 15-29 mL/min/1.73 square meters)    Stage 5 End Stage CKD (GFR <15 mL/min/1.73 square meters)  Note: GFR calculation is accurate only with a steady state creatinine    Lipase [[id number]]  (Normal) Collected: 11/04/23 1543    Lab Status: Final result Specimen: Blood from Arm, Right Updated: 11/04/23 1612     Lipase 19 u/L     APTT [[id number]]  (Normal) Collected: 11/04/23 1543    Lab Status: Final result Specimen: Blood from Arm, Right Updated: 11/04/23 1607     PTT 30 seconds     Protime-INR [[id number]]  (Abnormal) Collected: 11/04/23 1543    Lab Status: Final result Specimen: Blood from Arm, Right Updated: 11/04/23 1607     Protime 14.8 seconds      INR 1.10    CBC and differential [[id number]] Collected: 11/04/23 1543    Lab Status: Final result Specimen: Blood from Arm, Right Updated: 11/04/23 1558     WBC 6.58 Thousand/uL      RBC 4.14 Million/uL      Hemoglobin 12.4 g/dL      Hematocrit 36.3 %      MCV 88 fL      MCH 30.0 pg      MCHC 34.2 g/dL      RDW 12.2 %      MPV 9.4 fL      Platelets 255 Thousands/uL      nRBC 0 /100 WBCs      Neutrophils Relative 65 %      Immat GRANS % 0 %      Lymphocytes Relative 28 %      Monocytes Relative 6 %      Eosinophils Relative 0 %      Basophils Relative 1 %      Neutrophils Absolute 4.27 Thousands/µL      Immature Grans Absolute 0.00 Thousand/uL      Lymphocytes Absolute 1.87 Thousands/µL      Monocytes Absolute 0.39 Thousand/µL      Eosinophils Absolute 0.02 Thousand/µL      Basophils Absolute 0.03 Thousands/µL     UA w Reflex to Microscopic w Reflex to Culture [[id number]]  (Abnormal) Collected: 11/04/23 1543    Lab Status: Final result Specimen: Urine, Clean Catch Updated: 11/04/23 1556     Color, UA Colorless     Clarity, UA Clear     Specific Gravity, UA 1.001     pH, UA 6.0     Leukocytes, UA Negative     Nitrite, UA Negative     Protein, UA Negative mg/dl      Glucose, UA Negative mg/dl      Ketones, UA Trace mg/dl      Urobilinogen, UA <2.0 mg/dl      Bilirubin, UA Negative     Occult Blood, UA Negative    Occult Blood 1-3 Stool, (Diagnostic) [[id number]]     Lab Status: No result Specimen: Stool from Rectum                    CT abdomen pelvis wo contrast    (Results Pending)         Procedures  Procedures      ED Course  ED Course as of 11/04/23 1651   Sat Nov 04, 2023   1617 PREGNANCY TEST URINE: Negative                                       Medical Decision Making  69-year-old female presents with suprapubic and LLQ pain with blood in stool. Differentials include: IBD, infectious diarrhea, anal fissures, diverticulosis. Given patient recent abnormal IBD panel. Pending CT abdomen and patient was signed out to Dr. Saritha Cook. Amount and/or Complexity of Data Reviewed  Labs: ordered. Decision-making details documented in ED Course. Radiology: ordered. Disposition  Final diagnoses:   None     ED Disposition       None          Follow-up Information    None         Patient's Medications   Discharge Prescriptions    No medications on file     No discharge procedures on file. PDMP Review       None             ED Provider  Attending physically available and evaluated Yenni Pool. I managed the patient along with the ED Attending.     Electronically Signed by           Shawna Childs MD  11/04/23 1888

## 2023-11-04 NOTE — ED CARE HANDOFF
Emergency Department Sign Out Note        Sign out and transfer of care from Dr. Caron Elliott. See Separate Emergency Department note. The patient, Pierre Aleman, was evaluated by the previous provider for rectal bleeding.     Workup Completed:  Temp:  [98 °F (36.7 °C)] 98 °F (36.7 °C)  HR:  [65-67] 67  Resp:  [16] 16  BP: (124-140)/(59-80) 124/59  Recent Results (from the past 12 hour(s))   CBC and differential    Collection Time: 11/04/23  3:43 PM   Result Value Ref Range    WBC 6.58 4.31 - 10.16 Thousand/uL    RBC 4.14 3.81 - 5.12 Million/uL    Hemoglobin 12.4 11.5 - 15.4 g/dL    Hematocrit 36.3 34.8 - 46.1 %    MCV 88 82 - 98 fL    MCH 30.0 26.8 - 34.3 pg    MCHC 34.2 31.4 - 37.4 g/dL    RDW 12.2 11.6 - 15.1 %    MPV 9.4 8.9 - 12.7 fL    Platelets 880 634 - 600 Thousands/uL    nRBC 0 /100 WBCs    Neutrophils Relative 65 43 - 75 %    Immat GRANS % 0 0 - 2 %    Lymphocytes Relative 28 14 - 44 %    Monocytes Relative 6 4 - 12 %    Eosinophils Relative 0 0 - 6 %    Basophils Relative 1 0 - 1 %    Neutrophils Absolute 4.27 1.85 - 7.62 Thousands/µL    Immature Grans Absolute 0.00 0.00 - 0.20 Thousand/uL    Lymphocytes Absolute 1.87 0.60 - 4.47 Thousands/µL    Monocytes Absolute 0.39 0.17 - 1.22 Thousand/µL    Eosinophils Absolute 0.02 0.00 - 0.61 Thousand/µL    Basophils Absolute 0.03 0.00 - 0.10 Thousands/µL   Comprehensive metabolic panel    Collection Time: 11/04/23  3:43 PM   Result Value Ref Range    Sodium 141 135 - 147 mmol/L    Potassium 3.6 3.5 - 5.3 mmol/L    Chloride 105 96 - 108 mmol/L    CO2 28 21 - 32 mmol/L    ANION GAP 8 mmol/L    BUN 5 5 - 25 mg/dL    Creatinine 0.79 0.60 - 1.30 mg/dL    Glucose 90 65 - 140 mg/dL    Calcium 9.4 8.4 - 10.2 mg/dL    AST 11 (L) 13 - 39 U/L    ALT 7 7 - 52 U/L    Alkaline Phosphatase 53 34 - 104 U/L    Total Protein 7.2 6.4 - 8.4 g/dL    Albumin 4.9 3.5 - 5.0 g/dL    Total Bilirubin 0.57 0.20 - 1.00 mg/dL    eGFR 102 ml/min/1.73sq m   Lipase    Collection Time: 11/04/23 3:43 PM   Result Value Ref Range    Lipase 19 11 - 82 u/L   UA w Reflex to Microscopic w Reflex to Culture    Collection Time: 11/04/23  3:43 PM    Specimen: Urine, Clean Catch   Result Value Ref Range    Color, UA Colorless     Clarity, UA Clear     Specific Gravity, UA 1.001 (L) 1.003 - 1.030    pH, UA 6.0 4.5, 5.0, 5.5, 6.0, 6.5, 7.0, 7.5, 8.0    Leukocytes, UA Negative Negative    Nitrite, UA Negative Negative    Protein, UA Negative Negative mg/dl    Glucose, UA Negative Negative mg/dl    Ketones, UA Trace (A) Negative mg/dl    Urobilinogen, UA <2.0 <2.0 mg/dl mg/dl    Bilirubin, UA Negative Negative    Occult Blood, UA Negative Negative   APTT    Collection Time: 11/04/23  3:43 PM   Result Value Ref Range    PTT 30 23 - 37 seconds   Protime-INR    Collection Time: 11/04/23  3:43 PM   Result Value Ref Range    Protime 14.8 (H) 11.6 - 14.5 seconds    INR 1.10 0.84 - 1.19   POCT pregnancy, urine    Collection Time: 11/04/23  4:16 PM   Result Value Ref Range    EXT Preg Test, Ur Negative     Control Valid            Medication Administration - last 24 hours from 11/04/2023 0003 to 11/05/2023 0003         Date/Time Order Dose Route Action Action by     11/04/2023 1716 EDT acetaminophen (TYLENOL) tablet 975 mg 975 mg Oral Given Lisa Yo RN              ED Course / Workup Pending (followup):    CT of the abdomen and pelvis revealed no acute abdominal pathology. Patient appears well, is nontoxic appearing, expresses understanding and agrees with plan of care at this time. In light of this patient would benefit from outpatient management. ED Course as of 11/05/23 0003   Sat Nov 04, 2023   1651 Sign out received       Procedures  Medical Decision Making  Amount and/or Complexity of Data Reviewed  Labs: ordered. Radiology: ordered. Risk  OTC drugs.             Disposition  Final diagnoses:   Rectal bleeding   Abdominal pain     Time reflects when diagnosis was documented in both MDM as applicable and the Disposition within this note       Time User Action Codes Description Comment    11/4/2023  6:31 PM Tacos Jose Add [K62.5] Rectal bleeding     11/4/2023  6:32 PM Tacos Jose Add [R10.9] Abdominal pain           ED Disposition       ED Disposition   Discharge    Condition   Stable    Date/Time   Sat Nov 4, 2023  6:31 PM    Comment   Elba Salcido Flurer discharge to home/self care. Follow-up Information       Follow up With Specialties Details Why Contact Info Additional [address], CRNP Nurse Practitioner Schedule an appointment as soon as possible for a visit in 3 days  [address] [facility]  [address]  [facility]  [address] [facility] Special[facility]) Gastroenterology Schedule an appointment as soon as possible for a visit  As needed [address] [phone] [facility] [place] ([place]), [address] [place] [address], [phone]    [address] [facility] Emergency Medicine Go to  If symptoms worsen [address] [facility], [place], [place], [address]          Discharge Medication List as of 11/4/2023  6:33 PM        CONTINUE these medications which have NOT CHANGED    Details   Cyanocobalamin 5000 MCG SUBL Place under the tongue, Historical Med      Fe Bisgly-Vit C-Vit B12-FA 28-60-0.008-0.4 MG CAPS Take by mouth, Historical Med      Ginkgo Biloba (GINKOBA PO) Take by mouth, Historical Med      Magnesium Gluconate 550 MG TABS Take 400 mg by mouth 2 (two) times a day, Historical Med      Restasis 0.05 % ophthalmic emulsion instill 1 drop into both eyes twice a day, Historical Med      VITAMIN D PO Take by mouth, Historical Med           No discharge procedures on file.        ED Provider  Electronically Signed by     Christine Flowers MD  11/05/23 [id number]

## 2023-11-04 NOTE — Clinical Note
Jon Tay was seen and treated in our emergency department on 11/4/2023. Diagnosis:     Trinidad Francisco  is off the rest of the shift today. She may return on this date: If you have any questions or concerns, please don't hesitate to call.       Lili More, DO    ______________________________           _______________          _______________  Hospital Representative                              Date                                Time

## 2023-11-06 PROCEDURE — 88305 TISSUE EXAM BY PATHOLOGIST: CPT | Performed by: STUDENT IN AN ORGANIZED HEALTH CARE EDUCATION/TRAINING PROGRAM

## 2023-11-09 ENCOUNTER — APPOINTMENT (OUTPATIENT)
Dept: LAB | Facility: AMBULARY SURGERY CENTER | Age: 28
End: 2023-11-09
Payer: COMMERCIAL

## 2023-11-09 ENCOUNTER — OFFICE VISIT (OUTPATIENT)
Dept: GASTROENTEROLOGY | Facility: AMBULARY SURGERY CENTER | Age: 28
End: 2023-11-09
Payer: COMMERCIAL

## 2023-11-09 VITALS
BODY MASS INDEX: 21.63 KG/M2 | WEIGHT: 129.8 LBS | SYSTOLIC BLOOD PRESSURE: 124 MMHG | HEART RATE: 55 BPM | OXYGEN SATURATION: 98 % | DIASTOLIC BLOOD PRESSURE: 76 MMHG | HEIGHT: 65 IN

## 2023-11-09 DIAGNOSIS — R10.32 LLQ PAIN: ICD-10-CM

## 2023-11-09 DIAGNOSIS — R19.7 DIARRHEA, UNSPECIFIED TYPE: ICD-10-CM

## 2023-11-09 DIAGNOSIS — D50.9 IRON DEFICIENCY ANEMIA, UNSPECIFIED IRON DEFICIENCY ANEMIA TYPE: ICD-10-CM

## 2023-11-09 DIAGNOSIS — K21.9 GASTROESOPHAGEAL REFLUX DISEASE, UNSPECIFIED WHETHER ESOPHAGITIS PRESENT: ICD-10-CM

## 2023-11-09 DIAGNOSIS — K62.5 RECTAL BLEEDING: Primary | ICD-10-CM

## 2023-11-09 PROCEDURE — 87177 OVA AND PARASITES SMEARS: CPT

## 2023-11-09 PROCEDURE — 99204 OFFICE O/P NEW MOD 45 MIN: CPT | Performed by: PHYSICIAN ASSISTANT

## 2023-11-09 PROCEDURE — 86258 DGP ANTIBODY EACH IG CLASS: CPT

## 2023-11-09 PROCEDURE — 86231 EMA EACH IG CLASS: CPT

## 2023-11-09 PROCEDURE — 87209 SMEAR COMPLEX STAIN: CPT

## 2023-11-09 PROCEDURE — 82784 ASSAY IGA/IGD/IGG/IGM EACH: CPT

## 2023-11-09 PROCEDURE — 87329 GIARDIA AG IA: CPT

## 2023-11-09 PROCEDURE — 36415 COLL VENOUS BLD VENIPUNCTURE: CPT

## 2023-11-09 PROCEDURE — 87505 NFCT AGENT DETECTION GI: CPT

## 2023-11-09 PROCEDURE — 86364 TISS TRNSGLTMNASE EA IG CLAS: CPT

## 2023-11-09 PROCEDURE — 87493 C DIFF AMPLIFIED PROBE: CPT

## 2023-11-09 RX ORDER — DICYCLOMINE HYDROCHLORIDE 10 MG/1
10 CAPSULE ORAL 3 TIMES DAILY PRN
Qty: 40 CAPSULE | Refills: 5 | Status: SHIPPED | OUTPATIENT
Start: 2023-11-09

## 2023-11-09 NOTE — LETTER
November 9, 2023     ROSA M Luna  [address]    Patient: Price Tarango   YOB: 1995   Date of Visit: 11/9/2023       Dear Dr. Yenifer Pearson: Thank you for referring Zoya Horner to me for evaluation. Below are my notes for this consultation. If you have questions, please do not hesitate to call me. I look forward to following your patient along with you. Sincerely,        Daylin Trinh PA-C        CC: YONATHAN Villalpando PA-C  11/9/2023  2:22 PM  [facility] - New Patient Office Visit  Hodan Scottrer 33 y.o. female MRN: [id number]  Encounter: [id number]          ASSESSMENT AND PLAN:      1. Rectal bleeding  -This is new for patient  -CT on November 4, 2023 unrevealing  -Over 12/20/2023 IBD panel showed positive ASCA concern for Crohn's disease  -Plan colonoscopy to rule out inflammatory bowel disease  - This risks of this procedure include but are not limited to; anesthesia complications, injury/perforation of the bowel, infection and bleeding. 2. LLQ pain  -ALT at this time    3. Iron deficiency anemia, unspecified iron deficiency anemia type  -Currently on oral iron  -CBC from November 4, 2023 shows normal hemoglobin and normal indices  -Continue to follow with primary care with regard to future labs and any iron supplementation  -We will plan EGD with small bowel biopsy rule out GI bleeding as well as celiac disease  - This risks of this procedure include but are not limited to; anesthesia complications, injury/perforation of the bowel, infection and bleeding. 4. GERD  - takes pepcid and tums    5.  Diarrhea  - CRP on 9/29/23 normal  - TSH normal 6/29/23  - check celiac panel  - check stool enteric panel, c.diff, O&P and Giardia  ______________________________________________________________________    Chief Complaint: Rectal bleeding and abdominal pain    HPI: Is a 81-year-old white female new to our office with past medical history of dysmenorrhea and iron deficiency anemia. She was recently in the emergency room on November 4, 2023 with left lower quadrant abdominal pain as well as rectal bleeding. Noncontrast CT was unrevealing. She still does have her gallbladder. Symptoms have been going on for 2 days. She does admit to recent travel to the [employer] 1 month ago. She did have associated nausea but no vomiting. On inflammatory bowel disease panel done on October 12, 2023 was positive for ASCA. She reports many complaints including GERD, gagging with eating, lost of appetite, diarrhea and rectal bleeding.  9/2023 CPR normal.  6/2023 TSH normal.  Takes pepcid and TUMS for GERD which helps. She feels she has lost about 10 pounds in past 2 months. How long have you had symptoms - 5 years  Form of stool - loose to watery  Frequency of stool - 3-6 times per day  Recent sick contacts or hospitalization - No  Recent antibiotics - No  Any new medications - No  Recent Travel - to [place] about a month ago  History of cholecystectomy - No  History of bowel resections - No  Family history of IBS, IBD, Celiac disease - Paternal uncle with celiac  Nocturnal diarrhea - No  Is diarrhea mainly post prandial - Yes  Known food allergies (eg Lactose or Gluten) - No   Blood in stool - No    Years of known anemia - 1 year  Overt bleeding (eg  vomiting blood, coughing blood, urinating blood, black or bloody stools) - No  History of bariatric surgery - No  History of bowel resection - No  History of blood transfusions - none  History of Iron supplementation - none  Vegetarian - No  Menstrual Cycle - menorrhagia      No pacemaker or defibrillator implanted. No chronic kidney disease or solitary kidney. Not on any supplemental oxygen at night. Not on any antiplatelet or anticoagulants.       Endoscopy History:  EGD -none previously  Colonoscopy -none previously    REVIEW OF SYSTEMS:    CONSTITUTIONAL: Denies any fever, chills, rigors, and weight loss. HEENT: Denies hearing loss or visual disturbances. CARDIOVASCULAR: No chest pain or palpitations. RESPIRATORY: Denies any cough, hemoptysis, shortness of breath or dyspnea on exertion. GASTROINTESTINAL: As noted in the History of Present Illness. GENITOURINARY: No problems with urination. Denies any hematuria or dysuria. NEUROLOGIC: No dizziness or vertigo, denies headaches. MUSCULOSKELETAL: Denies any muscle or joint pain. SKIN: Denies skin rashes or itching. ENDOCRINE: Denies excessive thirst. Denies intolerance to heat or cold. PSYCHOSOCIAL: Denies depression or anxiety. Denies any recent memory loss.        Historical Information  Past Medical History:   Diagnosis Date   • ADHD    • Anemia    • Chlamydia    • Migraine    • Mononucleosis    • Ovarian cyst      Past Surgical History:   Procedure Laterality Date   • SINUS SURGERY     • WISDOM TOOTH EXTRACTION       Social History  Social History     Substance and Sexual Activity   Alcohol Use Yes   • Alcohol/week: 1.0 standard drink of alcohol   • Types: 1 Glasses of wine per week    Comment: socially weekly     Social History     Substance and Sexual Activity   Drug Use Never     Social History     Tobacco Use   Smoking Status Never   Smokeless Tobacco Never     Family History   Problem Relation Age of Onset   • Hypertension Mother    • Hypertension Father    • No Known Problems Brother    • Hypertension Maternal Grandmother    • Cancer Maternal Grandfather         bladder   • Lupus Paternal Grandmother    • Diabetes Paternal Grandmother    • Alzheimer's disease Paternal Grandfather        Meds/Allergies      Current Outpatient Medications:   •  Restasis 0.05 % ophthalmic emulsion  •  VITAMIN D PO  •  Cyanocobalamin 5000 MCG SUBL  •  Fe Bisgly-Vit C-Vit B12-FA 28-60-0.008-0.4 MG CAPS  •  Ginkgo Biloba (GINKOBA PO)  •  Magnesium Gluconate 550 MG TABS    No Known Allergies        Objective    Blood pressure 124/76, pulse 55, height 5' 5" (1.651 m), weight 58.9 kg (129 lb 12.8 oz), SpO2 98 %. Body mass index is 21.6 kg/m². PHYSICAL EXAM:      General Appearance:   Alert, cooperative, no distress, appears stated age   HEENT:   Normocephalic, atraumatic, anicteric. Neck:  Supple, symmetrical   Lungs:   Clear to auscultation bilaterally; no rales, rhonchi or wheezing; respirations unlabored    Heart[de-identified]   Regular rate and rhythm; no murmur, rub, or gallop. Abdomen:   TTP B/L lower quadrants with R>L, Soft,  non-distended; normal bowel sounds; no masses, no organomegaly    Genitalia:   Deferred    Rectal:   Deferred    Extremities:  No cyanosis, clubbing or edema    Pulses:  Not assessed   Skin:  No jaundice, rashes, or lesions    Lymph nodes:  Not assessed       Lab Results:   No visits with results within 1 Day(s) from this visit.    Latest known visit with results is:   Admission on 11/04/2023, Discharged on 11/04/2023   Component Date Value   • WBC 11/04/2023 6.58    • RBC 11/04/2023 4.14    • Hemoglobin 11/04/2023 12.4    • Hematocrit 11/04/2023 36.3    • MCV 11/04/2023 88    • MCH 11/04/2023 30.0    • MCHC 11/04/2023 34.2    • RDW 11/04/2023 12.2    • MPV 11/04/2023 9.4    • Platelets 14/08/7320 247    • nRBC 11/04/2023 0    • Neutrophils Relative 11/04/2023 65    • Immat GRANS % 11/04/2023 0    • Lymphocytes Relative 11/04/2023 28    • Monocytes Relative 11/04/2023 6    • Eosinophils Relative 11/04/2023 0    • Basophils Relative 11/04/2023 1    • Neutrophils Absolute 11/04/2023 4.27    • Immature Grans Absolute 11/04/2023 0.00    • Lymphocytes Absolute 11/04/2023 1.87    • Monocytes Absolute 11/04/2023 0.39    • Eosinophils Absolute 11/04/2023 0.02    • Basophils Absolute 11/04/2023 0.03    • Sodium 11/04/2023 141    • Potassium 11/04/2023 3.6    • Chloride 11/04/2023 105    • CO2 11/04/2023 28    • ANION GAP 11/04/2023 8    • BUN 11/04/2023 5    • Creatinine 11/04/2023 0.79    • Glucose 11/04/2023 90    • Calcium 11/04/2023 9.4    • AST 11/04/2023 11 (L)    • ALT 11/04/2023 7    • Alkaline Phosphatase 11/04/2023 53    • Total Protein 11/04/2023 7.2    • Albumin 11/04/2023 4.9    • Total Bilirubin 11/04/2023 0.57    • eGFR 11/04/2023 102    • Lipase 11/04/2023 19    • Color, UA 11/04/2023 Colorless    • Clarity, UA 11/04/2023 Clear    • Specific Gravity, UA 11/04/2023 1.001 (L)    • pH, UA 11/04/2023 6.0    • Leukocytes, UA 11/04/2023 Negative    • Nitrite, UA 11/04/2023 Negative    • Protein, UA 11/04/2023 Negative    • Glucose, UA 11/04/2023 Negative    • Ketones, UA 11/04/2023 Trace (A)    • Urobilinogen, UA 11/04/2023 <2.0    • Bilirubin, UA 11/04/2023 Negative    • Occult Blood, UA 11/04/2023 Negative    • PTT 11/04/2023 30    • Protime 11/04/2023 14.8 (H)    • INR 11/04/2023 1.10    • EXT Preg Test, Ur 11/04/2023 Negative    • Control 11/04/2023 Valid          Radiology Results:   US pelvis complete w transvaginal    Result Date: 11/8/2023  Narrative: PELVIC ULTRASOUND, COMPLETE INDICATION:  The patient is 33years old. N93.9: Abnormal uterine and vaginal bleeding, unspecified. COMPARISON: 4/13/2022 TECHNIQUE:   Transabdominal pelvic ultrasound was performed in sagittal and transverse planes with a curvilinear transducer. Additional transvaginal imaging was performed to better evaluate the endometrium and ovaries. Imaging included volumetric sweeps as well as traditional still imaging technique. FINDINGS: UTERUS: The uterus is anteverted in position, measuring 7.5 x 3.9 x 5.1 cm. The uterus has a normal contour and echotexture. The cervix appears within normal limits. ENDOMETRIUM: The endometrial echo complex has an AP caliber of 7.0 mm. IUD noted, centrally located within the endometrial cavity. Visualized endometrial echo complex otherwise within normal limits. OVARIES/ADNEXA: Right ovary:  4.2 x 2.8 x 2.7 cm. 16.3 mL.  Left ovary:  4.6 x 3.0 x 1.9 cm. 13.0 mL. Ovarian Doppler flow is within normal limits. Bilateral peripherally oriented ovarian follicles could relate to polycystic ovaries. Multiseptated complex 4 x 2.7 x 2.8 cm structure within the left ovary or adjacent to the left ovary without discrete nodular component. OTHER: No free fluid or loculated fluid collections. Impression: Bilateral peripherally oriented ovarian follicles could relate to polycystic ovaries. Multiseptated complex 4 x 2.7 x 2.8 cm structure within the left ovary or adjacent to the left ovary without discrete nodular component. . Short-term follow-up with pelvic ultrasound is recommended in 3 to 6 months. The study was marked in EPIC for significant notification. Workstation performed: [id number]     CT abdomen pelvis wo contrast    Result Date: 11/4/2023  Narrative: CT ABDOMEN AND PELVIS WITHOUT IV CONTRAST INDICATION:   LLQ abdominal pain Abdominal pain with blood in stool. "81-year-old female presents with suprapubic and LLQ pain with blood in stool. Differentials include: IBD, infectious diarrhea, anal fissures, diverticulosis. " COMPARISON: Pelvic ultrasound from yesterday. TECHNIQUE:  CT examination of the abdomen and pelvis was performed without intravenous contrast. Multiplanar 2D reformatted images were created from the source data. This examination, like all CT scans performed in the [facility], was performed utilizing techniques to minimize radiation dose exposure, including the use of iterative reconstruction and automated exposure control. Radiation dose length product (DLP) for this visit:  377 mGy-cm Enteric contrast was administered. FINDINGS: ABDOMEN LOWER CHEST:  No clinically significant abnormality identified in the visualized lower chest. LIVER/BILIARY TREE:  Unremarkable. GALLBLADDER:  No calcified gallstones. No pericholecystic inflammatory change. SPLEEN:  Unremarkable. PANCREAS:  Unremarkable. ADRENAL GLANDS:  Unremarkable.  KIDNEYS/URETERS: Unremarkable. No hydronephrosis. STOMACH AND BOWEL:  Unremarkable. APPENDIX: Noninflamed. ABDOMINOPELVIC CAVITY:  No ascites. No pneumoperitoneum. No lymphadenopathy. VESSELS:  Unremarkable for patient's age. PELVIS REPRODUCTIVE ORGANS: An IUD is in place. The left adnexal cyst seen on the ultrasound is less apparent/less well characterized on this study. URINARY BLADDER:  Unremarkable. ABDOMINAL WALL/INGUINAL REGIONS:  Unremarkable. OSSEOUS STRUCTURES:  No acute fracture or destructive osseous lesion. Impression: No acute findings. Workstation performed: [id number]       Braulio PedrozaYing Dietrich  11/9/2023  2:11 PM  Sign when Signing Visit  [facility] - New Patient Office Visit  Cayden Chang Flurer 33 y.o. female MRN: [id number]  Encounter: [id number]          ASSESSMENT AND PLAN:      1. Rectal bleeding  -This is new for patient  -CT on November 4, 2023 unrevealing  -Over 12/20/2023 IBD panel showed positive ASCA concern for Crohn's disease  -Plan colonoscopy to rule out inflammatory bowel disease  - This risks of this procedure include but are not limited to; anesthesia complications, injury/perforation of the bowel, infection and bleeding. 2. LLQ pain  -ALT at this time    3. Iron deficiency anemia, unspecified iron deficiency anemia type  -Currently on oral iron  -CBC from November 4, 2023 shows normal hemoglobin and normal indices  -Continue to follow with primary care with regard to future labs and any iron supplementation  -We will plan EGD with small bowel biopsy rule out GI bleeding as well as celiac disease  - This risks of this procedure include but are not limited to; anesthesia complications, injury/perforation of the bowel, infection and bleeding. 4. GERD  -     5.  Diarrhea  - CRP on 9/29/23 normal  - TSH normal 6/29/23  - check celiac panel  - check stool enteric panel, c.diff, O&P and Giardia  ______________________________________________________________________    Chief Complaint: Rectal bleeding and abdominal pain    HPI: Is a 41-year-old white female new to our office with past medical history of dysmenorrhea and iron deficiency anemia. She was recently in the emergency room on November 4, 2023 with left lower quadrant abdominal pain as well as rectal bleeding. Noncontrast CT was unrevealing. She still does have her gallbladder. Symptoms have been going on for 2 days. She does admit to recent travel to the [place] 1 month ago. She did have associated nausea but no vomiting. On inflammatory bowel disease panel done on October 12, 2023 was positive for ASCA. She reports many complaints including GERD, gagging with eating, lost of appetite, diarrhea and rectal bleeding.  9/2023 CPR normal.  6/2023 TSH normal.    How long have you had symptoms -   Form of stool - loose to watery  Frequency of stool - x times per day  Recent sick contacts or hospitalization - No  Recent antibiotics - No  Any new medications - No  Recent Travel - to [place] about a month ago  History of cholecystectomy - No  History of bowel resections -   Family history of IBS, IBD, Celiac disease - No  Nocturnal diarrhea - No  Is diarrhea mainly post prandial - Yes  Known food allergies (eg Lactose or Gluten) - No   Blood in stool - No  Previous colonoscopy -       No pacemaker or defibrillator implanted. No chronic kidney disease or solitary kidney. Not on any supplemental oxygen at night. Not on any antiplatelet or anticoagulants. Endoscopy History:  EGD -none previously  Colonoscopy -none previously    REVIEW OF SYSTEMS:    CONSTITUTIONAL: Denies any fever, chills, rigors, and weight loss. HEENT: Denies hearing loss or visual disturbances. CARDIOVASCULAR: No chest pain or palpitations. RESPIRATORY: Denies any cough, hemoptysis, shortness of breath or dyspnea on exertion.   GASTROINTESTINAL: As noted in the History of Present Illness. GENITOURINARY: No problems with urination. Denies any hematuria or dysuria. NEUROLOGIC: No dizziness or vertigo, denies headaches. MUSCULOSKELETAL: Denies any muscle or joint pain. SKIN: Denies skin rashes or itching. ENDOCRINE: Denies excessive thirst. Denies intolerance to heat or cold. PSYCHOSOCIAL: Denies depression or anxiety. Denies any recent memory loss. Historical Information  Past Medical History:   Diagnosis Date   • ADHD    • Anemia    • Chlamydia    • Migraine    • Mononucleosis    • Ovarian cyst      Past Surgical History:   Procedure Laterality Date   • SINUS SURGERY     • WISDOM TOOTH EXTRACTION       Social History  Social History     Substance and Sexual Activity   Alcohol Use Yes   • Alcohol/week: 1.0 standard drink of alcohol   • Types: 1 Glasses of wine per week    Comment: socially weekly     Social History     Substance and Sexual Activity   Drug Use Never     Social History     Tobacco Use   Smoking Status Never   Smokeless Tobacco Never     Family History   Problem Relation Age of Onset   • Hypertension Mother    • Hypertension Father    • No Known Problems Brother    • Hypertension Maternal Grandmother    • Cancer Maternal Grandfather         bladder   • Lupus Paternal Grandmother    • Diabetes Paternal Grandmother    • Alzheimer's disease Paternal Grandfather        Meds/Allergies      Current Outpatient Medications:   •  Restasis 0.05 % ophthalmic emulsion  •  VITAMIN D PO  •  Cyanocobalamin 5000 MCG SUBL  •  Fe Bisgly-Vit C-Vit B12-FA 28-60-0.008-0.4 MG CAPS  •  Ginkgo Biloba (GINKOBA PO)  •  Magnesium Gluconate 550 MG TABS    No Known Allergies        Objective    Blood pressure 124/76, pulse 55, height 5' 5" (1.651 m), weight 58.9 kg (129 lb 12.8 oz), SpO2 98 %. Body mass index is 21.6 kg/m².         PHYSICAL EXAM:      General Appearance:   Alert, cooperative, no distress, appears stated age   HEENT:   Normocephalic, atraumatic, anicteric. Neck:  Supple, symmetrical   Lungs:   Clear to auscultation bilaterally; no rales, rhonchi or wheezing; respirations unlabored    Heart[de-identified]   Regular rate and rhythm; no murmur, rub, or gallop. Abdomen:   Soft, non-tender, non-distended; normal bowel sounds; no masses, no organomegaly    Genitalia:   Deferred    Rectal:   Deferred    Extremities:  No cyanosis, clubbing or edema    Pulses:  Not assessed   Skin:  No jaundice, rashes, or lesions    Lymph nodes:  Not assessed       Lab Results:   No visits with results within 1 Day(s) from this visit.    Latest known visit with results is:   Admission on 11/04/2023, Discharged on 11/04/2023   Component Date Value   • WBC 11/04/2023 6.58    • RBC 11/04/2023 4.14    • Hemoglobin 11/04/2023 12.4    • Hematocrit 11/04/2023 36.3    • MCV 11/04/2023 88    • MCH 11/04/2023 30.0    • MCHC 11/04/2023 34.2    • RDW 11/04/2023 12.2    • MPV 11/04/2023 9.4    • Platelets 96/81/5236 247    • nRBC 11/04/2023 0    • Neutrophils Relative 11/04/2023 65    • Immat GRANS % 11/04/2023 0    • Lymphocytes Relative 11/04/2023 28    • Monocytes Relative 11/04/2023 6    • Eosinophils Relative 11/04/2023 0    • Basophils Relative 11/04/2023 1    • Neutrophils Absolute 11/04/2023 4.27    • Immature Grans Absolute 11/04/2023 0.00    • Lymphocytes Absolute 11/04/2023 1.87    • Monocytes Absolute 11/04/2023 0.39    • Eosinophils Absolute 11/04/2023 0.02    • Basophils Absolute 11/04/2023 0.03    • Sodium 11/04/2023 141    • Potassium 11/04/2023 3.6    • Chloride 11/04/2023 105    • CO2 11/04/2023 28    • ANION GAP 11/04/2023 8    • BUN 11/04/2023 5    • Creatinine 11/04/2023 0.79    • Glucose 11/04/2023 90    • Calcium 11/04/2023 9.4    • AST 11/04/2023 11 (L)    • ALT 11/04/2023 7    • Alkaline Phosphatase 11/04/2023 53    • Total Protein 11/04/2023 7.2    • Albumin 11/04/2023 4.9    • Total Bilirubin 11/04/2023 0.57    • eGFR 11/04/2023 102    • Lipase 11/04/2023 19    • Color, UA 11/04/2023 Colorless    • Clarity, UA 11/04/2023 Clear    • Specific Gravity, UA 11/04/2023 1.001 (L)    • pH, UA 11/04/2023 6.0    • Leukocytes, UA 11/04/2023 Negative    • Nitrite, UA 11/04/2023 Negative    • Protein, UA 11/04/2023 Negative    • Glucose, UA 11/04/2023 Negative    • Ketones, UA 11/04/2023 Trace (A)    • Urobilinogen, UA 11/04/2023 <2.0    • Bilirubin, UA 11/04/2023 Negative    • Occult Blood, UA 11/04/2023 Negative    • PTT 11/04/2023 30    • Protime 11/04/2023 14.8 (H)    • INR 11/04/2023 1.10    • EXT Preg Test, Ur 11/04/2023 Negative    • Control 11/04/2023 Valid          Radiology Results:   US pelvis complete w transvaginal    Result Date: 11/8/2023  Narrative: PELVIC ULTRASOUND, COMPLETE INDICATION:  The patient is 33years old. N93.9: Abnormal uterine and vaginal bleeding, unspecified. COMPARISON: 4/13/2022 TECHNIQUE:   Transabdominal pelvic ultrasound was performed in sagittal and transverse planes with a curvilinear transducer. Additional transvaginal imaging was performed to better evaluate the endometrium and ovaries. Imaging included volumetric sweeps as well as traditional still imaging technique. FINDINGS: UTERUS: The uterus is anteverted in position, measuring 7.5 x 3.9 x 5.1 cm. The uterus has a normal contour and echotexture. The cervix appears within normal limits. ENDOMETRIUM: The endometrial echo complex has an AP caliber of 7.0 mm. IUD noted, centrally located within the endometrial cavity. Visualized endometrial echo complex otherwise within normal limits. OVARIES/ADNEXA: Right ovary:  4.2 x 2.8 x 2.7 cm. 16.3 mL. Left ovary:  4.6 x 3.0 x 1.9 cm. 13.0 mL. Ovarian Doppler flow is within normal limits. Bilateral peripherally oriented ovarian follicles could relate to polycystic ovaries. Multiseptated complex 4 x 2.7 x 2.8 cm structure within the left ovary or adjacent to the left ovary without discrete nodular component. OTHER: No free fluid or loculated fluid collections. Impression: Bilateral peripherally oriented ovarian follicles could relate to polycystic ovaries. Multiseptated complex 4 x 2.7 x 2.8 cm structure within the left ovary or adjacent to the left ovary without discrete nodular component. . Short-term follow-up with pelvic ultrasound is recommended in 3 to 6 months. The study was marked in EPIC for significant notification. Workstation performed: [id number]     CT abdomen pelvis wo contrast    Result Date: 11/4/2023  Narrative: CT ABDOMEN AND PELVIS WITHOUT IV CONTRAST INDICATION:   LLQ abdominal pain Abdominal pain with blood in stool. "21-year-old female presents with suprapubic and LLQ pain with blood in stool. Differentials include: IBD, infectious diarrhea, anal fissures, diverticulosis. " COMPARISON: Pelvic ultrasound from yesterday. TECHNIQUE:  CT examination of the abdomen and pelvis was performed without intravenous contrast. Multiplanar 2D reformatted images were created from the source data. This examination, like all CT scans performed in the [facility], was performed utilizing techniques to minimize radiation dose exposure, including the use of iterative reconstruction and automated exposure control. Radiation dose length product (DLP) for this visit:  377 mGy-cm Enteric contrast was administered. FINDINGS: ABDOMEN LOWER CHEST:  No clinically significant abnormality identified in the visualized lower chest. LIVER/BILIARY TREE:  Unremarkable. GALLBLADDER:  No calcified gallstones. No pericholecystic inflammatory change. SPLEEN:  Unremarkable. PANCREAS:  Unremarkable. ADRENAL GLANDS:  Unremarkable. KIDNEYS/URETERS:  Unremarkable. No hydronephrosis. STOMACH AND BOWEL:  Unremarkable. APPENDIX: Noninflamed. ABDOMINOPELVIC CAVITY:  No ascites. No pneumoperitoneum. No lymphadenopathy. VESSELS:  Unremarkable for patient's age. PELVIS REPRODUCTIVE ORGANS: An IUD is in place.  The left adnexal cyst seen on the ultrasound is less apparent/less well characterized on this study. URINARY BLADDER:  Unremarkable. ABDOMINAL WALL/INGUINAL REGIONS:  Unremarkable. OSSEOUS STRUCTURES:  No acute fracture or destructive osseous lesion. Impression: No acute findings. Workstation performed: [id number]       Amairani Bain.  Brittany Jarrett PA-C

## 2023-11-09 NOTE — PROGRESS NOTES
[facility] - New Patient Office Visit  Helen Reid 33 y.o. female MRN: [id number]  Encounter: [id number]          ASSESSMENT AND PLAN:      1. Rectal bleeding  -This is new for patient  -CT on November 4, 2023 unrevealing  -Over 12/20/2023 IBD panel showed positive ASCA concern for Crohn's disease  -Plan colonoscopy to rule out inflammatory bowel disease  - This risks of this procedure include but are not limited to; anesthesia complications, injury/perforation of the bowel, infection and bleeding. 2. LLQ pain  -ALT at this time    3. Iron deficiency anemia, unspecified iron deficiency anemia type  -Currently on oral iron  -CBC from November 4, 2023 shows normal hemoglobin and normal indices  -Continue to follow with primary care with regard to future labs and any iron supplementation  -We will plan EGD with small bowel biopsy rule out GI bleeding as well as celiac disease  - This risks of this procedure include but are not limited to; anesthesia complications, injury/perforation of the bowel, infection and bleeding. 4. GERD  - takes pepcid and tums    5. Diarrhea  - CRP on 9/29/23 normal  - TSH normal 6/29/23  - check celiac panel  - check stool enteric panel, c.diff, O&P and Giardia  ______________________________________________________________________    Chief Complaint: Rectal bleeding and abdominal pain    HPI: Is a 41-year-old white female new to our office with past medical history of dysmenorrhea and iron deficiency anemia. She was recently in the emergency room on November 4, 2023 with left lower quadrant abdominal pain as well as rectal bleeding. Noncontrast CT was unrevealing. She still does have her gallbladder. Symptoms have been going on for 2 days. She does [place] to recent [place]. She did have associated nausea but no vomiting.   On inflammatory bowel disease panel done on October 12, 2023 was positive for ASCA.    She reports many complaints including GERD, gagging with eating, lost of appetite, diarrhea and rectal bleeding.  9/2023 CPR normal.  6/2023 TSH normal.  Takes pepcid and TUMS for GERD which helps. She feels she has lost about 10 pounds in past 2 months. How long have you had symptoms - 5 years  Form of stool - loose to watery  Frequency of stool - 3-6 times per day  Recent sick contacts or hospitalization - No  Recent antibiotics - No  Any new medications - No  Recent Travel - to [facility] about a month ago  History of cholecystectomy - No  History of bowel resections - No  Family history of IBS, IBD, Celiac disease - Paternal uncle with celiac  Nocturnal diarrhea - No  Is diarrhea mainly post prandial - Yes  Known food allergies (eg Lactose or Gluten) - No   Blood in stool - No    Years of known anemia - 1 year  Overt bleeding (eg  vomiting blood, coughing blood, urinating blood, black or bloody stools) - No  History of bariatric surgery - No  History of bowel resection - No  History of blood transfusions - none  History of Iron supplementation - none  Vegetarian - No  Menstrual Cycle - menorrhagia      No pacemaker or defibrillator implanted. No chronic kidney disease or solitary kidney. Not on any supplemental oxygen at night. Not on any antiplatelet or anticoagulants. Endoscopy History:  EGD -none previously  Colonoscopy -none previously    REVIEW OF SYSTEMS:    CONSTITUTIONAL: Denies any fever, chills, rigors, and weight loss. HEENT: Denies hearing loss or visual disturbances. CARDIOVASCULAR: No chest pain or palpitations. RESPIRATORY: Denies any cough, hemoptysis, shortness of breath or dyspnea on exertion. GASTROINTESTINAL: As noted in the History of Present Illness. GENITOURINARY: No problems with urination. Denies any hematuria or dysuria. NEUROLOGIC: No dizziness or vertigo, denies headaches. MUSCULOSKELETAL: Denies any muscle or joint pain.    SKIN: Denies skin rashes or itching. ENDOCRINE: Denies excessive thirst. Denies intolerance to heat or cold. PSYCHOSOCIAL: Denies depression or anxiety. Denies any recent memory loss. Historical Information   Past Medical History:   Diagnosis Date   • ADHD    • Anemia    • Chlamydia    • Migraine    • Mononucleosis    • Ovarian cyst      Past Surgical History:   Procedure Laterality Date   • SINUS SURGERY     • WISDOM TOOTH EXTRACTION       Social History   Social History     Substance and Sexual Activity   Alcohol Use Yes   • Alcohol/week: 1.0 standard drink of alcohol   • Types: 1 Glasses of wine per week    Comment: socially weekly     Social History     Substance and Sexual Activity   Drug Use Never     Social History     Tobacco Use   Smoking Status Never   Smokeless Tobacco Never     Family History   Problem Relation Age of Onset   • Hypertension Mother    • Hypertension Father    • No Known Problems Brother    • Hypertension Maternal Grandmother    • Cancer Maternal Grandfather         bladder   • Lupus Paternal Grandmother    • Diabetes Paternal Grandmother    • Alzheimer's disease Paternal Grandfather        Meds/Allergies       Current Outpatient Medications:   •  dicyclomine (BENTYL) 10 mg capsule  •  Restasis 0.05 % ophthalmic emulsion  •  VITAMIN D PO  •  Cyanocobalamin 5000 MCG SUBL  •  Fe Bisgly-Vit C-Vit B12-FA 28-60-0.008-0.4 MG CAPS  •  Ginkgo Biloba (GINKOBA PO)  •  Magnesium Gluconate 550 MG TABS    No Known Allergies        Objective     Blood pressure 124/76, pulse 55, height 5' 5" (1.651 m), weight 58.9 kg (129 lb 12.8 oz), SpO2 98 %. Body mass index is 21.6 kg/m². PHYSICAL EXAM:      General Appearance:   Alert, cooperative, no distress, appears stated age   HEENT:   Normocephalic, atraumatic, anicteric. Neck:  Supple, symmetrical   Lungs:   Clear to auscultation bilaterally; no rales, rhonchi or wheezing; respirations unlabored    Heart[de-identified]   Regular rate and rhythm; no murmur, rub, or gallop. Abdomen:   TTP B/L lower quadrants with R>L, Soft,  non-distended; normal bowel sounds; no masses, no organomegaly    Genitalia:   Deferred    Rectal:   Deferred    Extremities:  No cyanosis, clubbing or edema    Pulses:  Not assessed   Skin:  No jaundice, rashes, or lesions    Lymph nodes:  Not assessed       Lab Results:   No visits with results within 1 Day(s) from this visit.    Latest known visit with results is:   Admission on 11/04/2023, Discharged on 11/04/2023   Component Date Value   • WBC 11/04/2023 6.58    • RBC 11/04/2023 4.14    • Hemoglobin 11/04/2023 12.4    • Hematocrit 11/04/2023 36.3    • MCV 11/04/2023 88    • MCH 11/04/2023 30.0    • MCHC 11/04/2023 34.2    • RDW 11/04/2023 12.2    • MPV 11/04/2023 9.4    • Platelets 50/83/5061 247    • nRBC 11/04/2023 0    • Neutrophils Relative 11/04/2023 65    • Immat GRANS % 11/04/2023 0    • Lymphocytes Relative 11/04/2023 28    • Monocytes Relative 11/04/2023 6    • Eosinophils Relative 11/04/2023 0    • Basophils Relative 11/04/2023 1    • Neutrophils Absolute 11/04/2023 4.27    • Immature Grans Absolute 11/04/2023 0.00    • Lymphocytes Absolute 11/04/2023 1.87    • Monocytes Absolute 11/04/2023 0.39    • Eosinophils Absolute 11/04/2023 0.02    • Basophils Absolute 11/04/2023 0.03    • Sodium 11/04/2023 141    • Potassium 11/04/2023 3.6    • Chloride 11/04/2023 105    • CO2 11/04/2023 28    • ANION GAP 11/04/2023 8    • BUN 11/04/2023 5    • Creatinine 11/04/2023 0.79    • Glucose 11/04/2023 90    • Calcium 11/04/2023 9.4    • AST 11/04/2023 11 (L)    • ALT 11/04/2023 7    • Alkaline Phosphatase 11/04/2023 53    • Total Protein 11/04/2023 7.2    • Albumin 11/04/2023 4.9    • Total Bilirubin 11/04/2023 0.57    • eGFR 11/04/2023 102    • Lipase 11/04/2023 19    • Color, UA 11/04/2023 Colorless    • Clarity, UA 11/04/2023 Clear    • Specific Gravity, UA 11/04/2023 1.001 (L)    • pH, UA 11/04/2023 6.0    • Leukocytes, UA 11/04/2023 Negative    • Nitrite, UA 11/04/2023 Negative    • Protein, UA 11/04/2023 Negative    • Glucose, UA 11/04/2023 Negative    • Ketones, UA 11/04/2023 Trace (A)    • Urobilinogen, UA 11/04/2023 <2.0    • Bilirubin, UA 11/04/2023 Negative    • Occult Blood, UA 11/04/2023 Negative    • PTT 11/04/2023 30    • Protime 11/04/2023 14.8 (H)    • INR 11/04/2023 1.10    • EXT Preg Test, Ur 11/04/2023 Negative    • Control 11/04/2023 Valid          Radiology Results:   US pelvis complete w transvaginal    Result Date: 11/8/2023  Narrative: PELVIC ULTRASOUND, COMPLETE INDICATION:  The patient is 33years old. N93.9: Abnormal uterine and vaginal bleeding, unspecified. COMPARISON: 4/13/2022 TECHNIQUE:   Transabdominal pelvic ultrasound was performed in sagittal and transverse planes with a curvilinear transducer. Additional transvaginal imaging was performed to better evaluate the endometrium and ovaries. Imaging included volumetric sweeps as well as traditional still imaging technique. FINDINGS: UTERUS: The uterus is anteverted in position, measuring 7.5 x 3.9 x 5.1 cm. The uterus has a normal contour and echotexture. The cervix appears within normal limits. ENDOMETRIUM: The endometrial echo complex has an AP caliber of 7.0 mm. IUD noted, centrally located within the endometrial cavity. Visualized endometrial echo complex otherwise within normal limits. OVARIES/ADNEXA: Right ovary:  4.2 x 2.8 x 2.7 cm. 16.3 mL. Left ovary:  4.6 x 3.0 x 1.9 cm. 13.0 mL. Ovarian Doppler flow is within normal limits. Bilateral peripherally oriented ovarian follicles could relate to polycystic ovaries. Multiseptated complex 4 x 2.7 x 2.8 cm structure within the left ovary or adjacent to the left ovary without discrete nodular component. OTHER: No free fluid or loculated fluid collections. Impression: Bilateral peripherally oriented ovarian follicles could relate to polycystic ovaries.  Multiseptated complex 4 x 2.7 x 2.8 cm structure within the left ovary or adjacent to the left ovary without discrete nodular component. . Short-term follow-up with pelvic ultrasound is recommended in 3 to 6 months. The study was marked in EPIC for significant notification. Workstation performed: [id number]     CT abdomen pelvis wo contrast    Result Date: 11/4/2023  Narrative: CT ABDOMEN AND PELVIS WITHOUT IV CONTRAST INDICATION:   LLQ abdominal pain Abdominal pain with blood in stool. "60-year-old female presents with suprapubic and LLQ pain with blood in stool. Differentials include: IBD, infectious diarrhea, anal fissures, diverticulosis. " COMPARISON: Pelvic ultrasound from yesterday. TECHNIQUE:  CT examination of the abdomen and pelvis was performed without intravenous contrast. Multiplanar 2D reformatted images were created from the source data. This examination, like all CT scans performed in the [facility], was performed utilizing techniques to minimize radiation dose exposure, including the use of iterative reconstruction and automated exposure control. Radiation dose length product (DLP) for this visit:  377 mGy-cm Enteric contrast was administered. FINDINGS: ABDOMEN LOWER CHEST:  No clinically significant abnormality identified in the visualized lower chest. LIVER/BILIARY TREE:  Unremarkable. GALLBLADDER:  No calcified gallstones. No pericholecystic inflammatory change. SPLEEN:  Unremarkable. PANCREAS:  Unremarkable. ADRENAL GLANDS:  Unremarkable. KIDNEYS/URETERS:  Unremarkable. No hydronephrosis. STOMACH AND BOWEL:  Unremarkable. APPENDIX: Noninflamed. ABDOMINOPELVIC CAVITY:  No ascites. No pneumoperitoneum. No lymphadenopathy. VESSELS:  Unremarkable for patient's age. PELVIS REPRODUCTIVE ORGANS: An IUD is in place. The left adnexal cyst seen on the ultrasound is less apparent/less well characterized on this study. URINARY BLADDER:  Unremarkable. ABDOMINAL WALL/INGUINAL REGIONS:  Unremarkable. OSSEOUS STRUCTURES:  No acute fracture or destructive osseous lesion. Impression: No acute findings. Workstation performed: [id number]       Jalen Flower.  Valery Randle PA-C

## 2023-11-09 NOTE — PATIENT INSTRUCTIONS
Scheduled date of EGD/colonoscopy (as of today):  11/16/23  Physician performing EGD/colonoscopy:  Dr Yue Dawn   Location of EGD/colonoscopy:  [facility]   Desired bowel prep reviewed with patient:  Miralax/Ducolax  Instructions reviewed with patient by: Darlene MUNROE   Clearances:  n/a

## 2023-11-09 NOTE — H&P (VIEW-ONLY)
[facility] - New Patient Office Visit  Ping Arnettr 33 y.o. female MRN: [id number]  Encounter: [id number]          ASSESSMENT AND PLAN:      1. Rectal bleeding  -This is new for patient  -CT on November 4, 2023 unrevealing  -Over 12/20/2023 IBD panel showed positive ASCA concern for Crohn's disease  -Plan colonoscopy to rule out inflammatory bowel disease  - This risks of this procedure include but are not limited to; anesthesia complications, injury/perforation of the bowel, infection and bleeding. 2. LLQ pain  -ALT at this time    3. Iron deficiency anemia, unspecified iron deficiency anemia type  -Currently on oral iron  -CBC from November 4, 2023 shows normal hemoglobin and normal indices  -Continue to follow with primary care with regard to future labs and any iron supplementation  -We will plan EGD with small bowel biopsy rule out GI bleeding as well as celiac disease  - This risks of this procedure include but are not limited to; anesthesia complications, injury/perforation of the bowel, infection and bleeding. 4. GERD  - takes pepcid and tums    5. Diarrhea  - CRP on 9/29/23 normal  - TSH normal 6/29/23  - check celiac panel  - check stool enteric panel, c.diff, O&P and Giardia  ______________________________________________________________________    Chief Complaint: Rectal bleeding and abdominal pain    HPI: Is a 56-year-old white female new to our office with past medical history of dysmenorrhea and iron deficiency anemia. She was recently in the emergency room on November 4, 2023 with left lower quadrant abdominal pain as well as rectal bleeding. Noncontrast CT was unrevealing. She still does have her gallbladder. Symptoms have been going on for 2 days. She does admit to recent travel to the [place] 1 month ago. She did have associated nausea but no vomiting.   On inflammatory bowel disease panel done on October 12, 2023 was positive for ASCA.    She reports many complaints including GERD, gagging with eating, lost of appetite, diarrhea and rectal bleeding.  9/2023 CPR normal.  6/2023 TSH normal.  Takes pepcid and TUMS for GERD which helps. She feels she has lost about 10 pounds in past 2 months. How long have you had symptoms - 5 years  Form of stool - loose to watery  Frequency of stool - 3-6 times per day  Recent sick contacts or hospitalization - No  Recent antibiotics - No  Any new medications - No  Recent Travel - to [facility] about a month ago  History of cholecystectomy - No  History of bowel resections - No  Family history of IBS, IBD, Celiac disease - Paternal uncle with celiac  Nocturnal diarrhea - No  Is diarrhea mainly post prandial - Yes  Known food allergies (eg Lactose or Gluten) - No   Blood in stool - No    Years of known anemia - 1 year  Overt bleeding (eg  vomiting blood, coughing blood, urinating blood, black or bloody stools) - No  History of bariatric surgery - No  History of bowel resection - No  History of blood transfusions - none  History of Iron supplementation - none  Vegetarian - No  Menstrual Cycle - menorrhagia      No pacemaker or defibrillator implanted. No chronic kidney disease or solitary kidney. Not on any supplemental oxygen at night. Not on any antiplatelet or anticoagulants. Endoscopy History:  EGD -none previously  Colonoscopy -none previously    REVIEW OF SYSTEMS:    CONSTITUTIONAL: Denies any fever, chills, rigors, and weight loss. HEENT: Denies hearing loss or visual disturbances. CARDIOVASCULAR: No chest pain or palpitations. RESPIRATORY: Denies any cough, hemoptysis, shortness of breath or dyspnea on exertion. GASTROINTESTINAL: As noted in the History of Present Illness. GENITOURINARY: No problems with urination. Denies any hematuria or dysuria. NEUROLOGIC: No dizziness or vertigo, denies headaches. MUSCULOSKELETAL: Denies any muscle or joint pain.    SKIN: Denies skin rashes or itching. ENDOCRINE: Denies excessive thirst. Denies intolerance to heat or cold. PSYCHOSOCIAL: Denies depression or anxiety. Denies any recent memory loss. Historical Information   Past Medical History:   Diagnosis Date   • ADHD    • Anemia    • Chlamydia    • Migraine    • Mononucleosis    • Ovarian cyst      Past Surgical History:   Procedure Laterality Date   • SINUS SURGERY     • WISDOM TOOTH EXTRACTION       Social History   Social History     Substance and Sexual Activity   Alcohol Use Yes   • Alcohol/week: 1.0 standard drink of alcohol   • Types: 1 Glasses of wine per week    Comment: socially weekly     Social History     Substance and Sexual Activity   Drug Use Never     Social History     Tobacco Use   Smoking Status Never   Smokeless Tobacco Never     Family History   Problem Relation Age of Onset   • Hypertension Mother    • Hypertension Father    • No Known Problems Brother    • Hypertension Maternal Grandmother    • Cancer Maternal Grandfather         bladder   • Lupus Paternal Grandmother    • Diabetes Paternal Grandmother    • Alzheimer's disease Paternal Grandfather        Meds/Allergies       Current Outpatient Medications:   •  dicyclomine (BENTYL) 10 mg capsule  •  Restasis 0.05 % ophthalmic emulsion  •  VITAMIN D PO  •  Cyanocobalamin 5000 MCG SUBL  •  Fe Bisgly-Vit C-Vit B12-FA 28-60-0.008-0.4 MG CAPS  •  Ginkgo Biloba (GINKOBA PO)  •  Magnesium Gluconate 550 MG TABS    No Known Allergies        Objective     Blood pressure 124/76, pulse 55, height 5' 5" (1.651 m), weight 58.9 kg (129 lb 12.8 oz), SpO2 98 %. Body mass index is 21.6 kg/m². PHYSICAL EXAM:      General Appearance:   Alert, cooperative, no distress, appears stated age   HEENT:   Normocephalic, atraumatic, anicteric. Neck:  Supple, symmetrical   Lungs:   Clear to auscultation bilaterally; no rales, rhonchi or wheezing; respirations unlabored    Heart[de-identified]   Regular rate and rhythm; no murmur, rub, or gallop. Abdomen:   TTP B/L lower quadrants with R>L, Soft,  non-distended; normal bowel sounds; no masses, no organomegaly    Genitalia:   Deferred    Rectal:   Deferred    Extremities:  No cyanosis, clubbing or edema    Pulses:  Not assessed   Skin:  No jaundice, rashes, or lesions    Lymph nodes:  Not assessed       Lab Results:   No visits with results within 1 Day(s) from this visit.    Latest known visit with results is:   Admission on 11/04/2023, Discharged on 11/04/2023   Component Date Value   • WBC 11/04/2023 6.58    • RBC 11/04/2023 4.14    • Hemoglobin 11/04/2023 12.4    • Hematocrit 11/04/2023 36.3    • MCV 11/04/2023 88    • MCH 11/04/2023 30.0    • MCHC 11/04/2023 34.2    • RDW 11/04/2023 12.2    • MPV 11/04/2023 9.4    • Platelets 10/92/6525 247    • nRBC 11/04/2023 0    • Neutrophils Relative 11/04/2023 65    • Immat GRANS % 11/04/2023 0    • Lymphocytes Relative 11/04/2023 28    • Monocytes Relative 11/04/2023 6    • Eosinophils Relative 11/04/2023 0    • Basophils Relative 11/04/2023 1    • Neutrophils Absolute 11/04/2023 4.27    • Immature Grans Absolute 11/04/2023 0.00    • Lymphocytes Absolute 11/04/2023 1.87    • Monocytes Absolute 11/04/2023 0.39    • Eosinophils Absolute 11/04/2023 0.02    • Basophils Absolute 11/04/2023 0.03    • Sodium 11/04/2023 141    • Potassium 11/04/2023 3.6    • Chloride 11/04/2023 105    • CO2 11/04/2023 28    • ANION GAP 11/04/2023 8    • BUN 11/04/2023 5    • Creatinine 11/04/2023 0.79    • Glucose 11/04/2023 90    • Calcium 11/04/2023 9.4    • AST 11/04/2023 11 (L)    • ALT 11/04/2023 7    • Alkaline Phosphatase 11/04/2023 53    • Total Protein 11/04/2023 7.2    • Albumin 11/04/2023 4.9    • Total Bilirubin 11/04/2023 0.57    • eGFR 11/04/2023 102    • Lipase 11/04/2023 19    • Color, UA 11/04/2023 Colorless    • Clarity, UA 11/04/2023 Clear    • Specific Gravity, UA 11/04/2023 1.001 (L)    • pH, UA 11/04/2023 6.0    • Leukocytes, UA 11/04/2023 Negative    • Nitrite, UA 11/04/2023 Negative    • Protein, UA 11/04/2023 Negative    • Glucose, UA 11/04/2023 Negative    • Ketones, UA 11/04/2023 Trace (A)    • Urobilinogen, UA 11/04/2023 <2.0    • Bilirubin, UA 11/04/2023 Negative    • Occult Blood, UA 11/04/2023 Negative    • PTT 11/04/2023 30    • Protime 11/04/2023 14.8 (H)    • INR 11/04/2023 1.10    • EXT Preg Test, Ur 11/04/2023 Negative    • Control 11/04/2023 Valid          Radiology Results:   US pelvis complete w transvaginal    Result Date: 11/8/2023  Narrative: PELVIC ULTRASOUND, COMPLETE INDICATION:  The patient is 33years old. N93.9: Abnormal uterine and vaginal bleeding, unspecified. COMPARISON: 4/13/2022 TECHNIQUE:   Transabdominal pelvic ultrasound was performed in sagittal and transverse planes with a curvilinear transducer. Additional transvaginal imaging was performed to better evaluate the endometrium and ovaries. Imaging included volumetric sweeps as well as traditional still imaging technique. FINDINGS: UTERUS: The uterus is anteverted in position, measuring 7.5 x 3.9 x 5.1 cm. The uterus has a normal contour and echotexture. The cervix appears within normal limits. ENDOMETRIUM: The endometrial echo complex has an AP caliber of 7.0 mm. IUD noted, centrally located within the endometrial cavity. Visualized endometrial echo complex otherwise within normal limits. OVARIES/ADNEXA: Right ovary:  4.2 x 2.8 x 2.7 cm. 16.3 mL. Left ovary:  4.6 x 3.0 x 1.9 cm. 13.0 mL. Ovarian Doppler flow is within normal limits. Bilateral peripherally oriented ovarian follicles could relate to polycystic ovaries. Multiseptated complex 4 x 2.7 x 2.8 cm structure within the left ovary or adjacent to the left ovary without discrete nodular component. OTHER: No free fluid or loculated fluid collections. Impression: Bilateral peripherally oriented ovarian follicles could relate to polycystic ovaries.  Multiseptated complex 4 x 2.7 x 2.8 cm structure within the left ovary or adjacent to the left ovary without discrete nodular component. . Short-term follow-up with pelvic ultrasound is recommended in 3 to 6 months. The study was marked in EPIC for significant notification. Workstation performed: [id number]     CT abdomen pelvis wo contrast    Result Date: 11/4/2023  Narrative: CT ABDOMEN AND PELVIS WITHOUT IV CONTRAST INDICATION:   LLQ abdominal pain Abdominal pain with blood in stool. "60-year-old female presents with suprapubic and LLQ pain with blood in stool. Differentials include: IBD, infectious diarrhea, anal fissures, diverticulosis. " COMPARISON: Pelvic ultrasound from yesterday. TECHNIQUE:  CT examination of the abdomen and pelvis was performed without intravenous contrast. Multiplanar 2D reformatted images were created from the source data. This examination, like all CT scans performed in the [facility], was performed utilizing techniques to minimize radiation dose exposure, including the use of iterative reconstruction and automated exposure control. Radiation dose length product (DLP) for this visit:  377 mGy-cm Enteric contrast was administered. FINDINGS: ABDOMEN LOWER CHEST:  No clinically significant abnormality identified in the visualized lower chest. LIVER/BILIARY TREE:  Unremarkable. GALLBLADDER:  No calcified gallstones. No pericholecystic inflammatory change. SPLEEN:  Unremarkable. PANCREAS:  Unremarkable. ADRENAL GLANDS:  Unremarkable. KIDNEYS/URETERS:  Unremarkable. No hydronephrosis. STOMACH AND BOWEL:  Unremarkable. APPENDIX: Noninflamed. ABDOMINOPELVIC CAVITY:  No ascites. No pneumoperitoneum. No lymphadenopathy. VESSELS:  Unremarkable for patient's age. PELVIS REPRODUCTIVE ORGANS: An IUD is in place. The left adnexal cyst seen on the ultrasound is less apparent/less well characterized on this study. URINARY BLADDER:  Unremarkable. ABDOMINAL WALL/INGUINAL REGIONS:  Unremarkable. OSSEOUS STRUCTURES:  No acute fracture or destructive osseous lesion. Impression: No acute findings. Workstation performed: [id number]       Joe Butler.  Donald Dupree PA-C

## 2023-11-10 LAB
C DIFF TOX GENS STL QL NAA+PROBE: NEGATIVE
CAMPYLOBACTER DNA SPEC NAA+PROBE: NORMAL
ENDOMYSIUM IGA SER QL: NEGATIVE
G LAMBLIA AG STL QL IA: NEGATIVE
GLIADIN PEPTIDE IGA SER-ACNC: 5 UNITS (ref 0–19)
GLIADIN PEPTIDE IGG SER-ACNC: 2 UNITS (ref 0–19)
IGA SERPL-MCNC: 154 MG/DL (ref 87–352)
SALMONELLA DNA SPEC QL NAA+PROBE: NORMAL
SHIGA TOXIN STX GENE SPEC NAA+PROBE: NORMAL
SHIGELLA DNA SPEC QL NAA+PROBE: NORMAL
TTG IGA SER-ACNC: <2 U/ML (ref 0–3)
TTG IGG SER-ACNC: <2 U/ML (ref 0–5)

## 2023-11-13 ENCOUNTER — OFFICE VISIT (OUTPATIENT)
Dept: OBGYN CLINIC | Facility: CLINIC | Age: 28
End: 2023-11-13
Payer: COMMERCIAL

## 2023-11-13 VITALS
SYSTOLIC BLOOD PRESSURE: 120 MMHG | BODY MASS INDEX: 21.33 KG/M2 | DIASTOLIC BLOOD PRESSURE: 70 MMHG | HEIGHT: 65 IN | WEIGHT: 128 LBS

## 2023-11-13 DIAGNOSIS — N94.6 DYSMENORRHEA: ICD-10-CM

## 2023-11-13 DIAGNOSIS — E28.2 PCOS (POLYCYSTIC OVARIAN SYNDROME): Primary | ICD-10-CM

## 2023-11-13 PROCEDURE — 99214 OFFICE O/P EST MOD 30 MIN: CPT | Performed by: STUDENT IN AN ORGANIZED HEALTH CARE EDUCATION/TRAINING PROGRAM

## 2023-11-13 NOTE — ASSESSMENT & PLAN NOTE
- We reviewed her US which suggests PCOS given her known oligomenorrhea. - We discussed management of PCOS. She has a thin phenotype. She plans future fertility in 2-3 years. We discussed fertility is very possible, often with ovulation induction with letrozole.

## 2023-11-13 NOTE — ASSESSMENT & PLAN NOTE
- Given her severe dysmenorrhea I suspect possible endometriosis, but she is also having a workup for possible Crohns.  - She currently contracepts with Copper IUD. She has severe dysmenorrhea. She is underoging workup for Crohns.  - We had previously discussed trying Mirena IUD instead to potentially help with dysmenorrhea, and she would like to return for this.

## 2023-11-13 NOTE — PROGRESS NOTES
[facility] [address] [place] [address], [address] [place] [address]    Assessment/Plan:  Marielena Larry is a 33 y.o. Slime Dugan who follows up after recent ultrasound suggests PCOS. PCOS (polycystic ovarian syndrome)  - We reviewed her US which suggests PCOS given her known oligomenorrhea. - We discussed management of PCOS. She has a thin phenotype. She plans future fertility in 2-3 years. We discussed fertility is very possible, often with ovulation induction with letrozole. Dysmenorrhea  - Given her severe dysmenorrhea I suspect possible endometriosis, but she is also having a workup for possible Crohns.  - She currently contracepts with Copper IUD. She has severe dysmenorrhea. She is underoging workup for Crohns.  - We had previously discussed trying Mirena IUD instead to potentially help with dysmenorrhea, and she would like to return for this. Diagnoses and all orders for this visit:    PCOS (polycystic ovarian syndrome)    Dysmenorrhea          Subjective: Marielena Larry is a 33 y.o. Slime Dugan female. CC: follow up    HPI: Maryse follows up after recent US showed PCOS. She also continues to have severe dysmenorrhea. She contracepts with Copper IUD. She has severe diarrhea and weight loss and is having a workup for Crohns. ROS: Review of Systems   Constitutional:  Negative for chills and fever. Respiratory:  Negative for cough and shortness of breath. Cardiovascular:  Negative for chest pain and leg swelling. Gastrointestinal:  Negative for abdominal pain, nausea and vomiting. Genitourinary:  Negative for dysuria, frequency and urgency. Neurological:  Negative for dizziness, light-headedness and headaches.        PFSH: The following portions of the patient's history were reviewed and updated as appropriate: allergies, current medications, past family history, past medical history, obstetric history, gynecologic history, past social history, past surgical history and problem list.       Objective:  /70   Ht 5' 5" (1.651 m)   Wt 58.1 kg (128 lb)   LMP 10/14/2023 (Exact Date)   BMI 21.30 kg/m²    Physical Exam  Constitutional:       Appearance: Normal appearance. Cardiovascular:      Rate and Rhythm: Normal rate. Pulmonary:      Effort: Pulmonary effort is normal.   Neurological:      Mental Status: She is alert.    Psychiatric:         Mood and Affect: Mood normal.         Behavior: Behavior normal.           Mahendra Stoddard [place]  [id number]  [address]  11/13/2023 5:10 PM

## 2023-11-14 ENCOUNTER — ANESTHESIA (OUTPATIENT)
Dept: ANESTHESIOLOGY | Facility: AMBULATORY SURGERY CENTER | Age: 28
End: 2023-11-14

## 2023-11-14 ENCOUNTER — ANESTHESIA EVENT (OUTPATIENT)
Dept: GASTROENTEROLOGY | Facility: AMBULATORY SURGERY CENTER | Age: 28
End: 2023-11-14

## 2023-11-14 ENCOUNTER — ANESTHESIA EVENT (OUTPATIENT)
Dept: ANESTHESIOLOGY | Facility: AMBULATORY SURGERY CENTER | Age: 28
End: 2023-11-14

## 2023-11-16 ENCOUNTER — ANESTHESIA (OUTPATIENT)
Dept: GASTROENTEROLOGY | Facility: AMBULATORY SURGERY CENTER | Age: 28
End: 2023-11-16

## 2023-11-16 ENCOUNTER — HOSPITAL ENCOUNTER (OUTPATIENT)
Dept: GASTROENTEROLOGY | Facility: AMBULATORY SURGERY CENTER | Age: 28
Discharge: HOME | End: 2023-11-16
Payer: COMMERCIAL

## 2023-11-16 VITALS
SYSTOLIC BLOOD PRESSURE: 105 MMHG | RESPIRATION RATE: 18 BRPM | TEMPERATURE: 97.5 F | HEIGHT: 65 IN | WEIGHT: 128 LBS | HEART RATE: 49 BPM | DIASTOLIC BLOOD PRESSURE: 79 MMHG | OXYGEN SATURATION: 100 % | BODY MASS INDEX: 21.33 KG/M2

## 2023-11-16 DIAGNOSIS — K21.9 GASTROESOPHAGEAL REFLUX DISEASE, UNSPECIFIED WHETHER ESOPHAGITIS PRESENT: ICD-10-CM

## 2023-11-16 DIAGNOSIS — K62.5 RECTAL BLEEDING: ICD-10-CM

## 2023-11-16 DIAGNOSIS — R19.7 DIARRHEA, UNSPECIFIED TYPE: ICD-10-CM

## 2023-11-16 DIAGNOSIS — D50.9 IRON DEFICIENCY ANEMIA, UNSPECIFIED IRON DEFICIENCY ANEMIA TYPE: ICD-10-CM

## 2023-11-16 PROBLEM — G43.909 MIGRAINE: Status: ACTIVE | Noted: 2023-11-16

## 2023-11-16 PROBLEM — F41.9 ANXIETY: Status: ACTIVE | Noted: 2023-11-16

## 2023-11-16 PROBLEM — F90.9 ADHD: Status: ACTIVE | Noted: 2023-11-16

## 2023-11-16 PROBLEM — Z98.890 PONV (POSTOPERATIVE NAUSEA AND VOMITING): Status: ACTIVE | Noted: 2023-11-16

## 2023-11-16 PROBLEM — R11.2 PONV (POSTOPERATIVE NAUSEA AND VOMITING): Status: ACTIVE | Noted: 2023-11-16

## 2023-11-16 LAB
EXT PREGNANCY TEST URINE: NEGATIVE
EXT. CONTROL: NORMAL

## 2023-11-16 PROCEDURE — 88305 TISSUE EXAM BY PATHOLOGIST: CPT | Performed by: STUDENT IN AN ORGANIZED HEALTH CARE EDUCATION/TRAINING PROGRAM

## 2023-11-16 PROCEDURE — 45380 COLONOSCOPY AND BIOPSY: CPT | Performed by: INTERNAL MEDICINE

## 2023-11-16 PROCEDURE — 43239 EGD BIOPSY SINGLE/MULTIPLE: CPT | Performed by: INTERNAL MEDICINE

## 2023-11-16 RX ORDER — SODIUM CHLORIDE 9 MG/ML
50 INJECTION, SOLUTION INTRAVENOUS CONTINUOUS
Status: DISCONTINUED | OUTPATIENT
Start: 2023-11-16 | End: 2023-11-20 | Stop reason: HOSPADM

## 2023-11-16 RX ORDER — SODIUM CHLORIDE, SODIUM LACTATE, POTASSIUM CHLORIDE, CALCIUM CHLORIDE 600; 310; 30; 20 MG/100ML; MG/100ML; MG/100ML; MG/100ML
125 INJECTION, SOLUTION INTRAVENOUS CONTINUOUS
Status: CANCELLED | OUTPATIENT
Start: 2023-11-16

## 2023-11-16 RX ORDER — PROPOFOL 10 MG/ML
INJECTION, EMULSION INTRAVENOUS AS NEEDED
Status: DISCONTINUED | OUTPATIENT
Start: 2023-11-16 | End: 2023-11-16

## 2023-11-16 RX ORDER — LIDOCAINE HYDROCHLORIDE 20 MG/ML
INJECTION, SOLUTION EPIDURAL; INFILTRATION; INTRACAUDAL; PERINEURAL AS NEEDED
Status: DISCONTINUED | OUTPATIENT
Start: 2023-11-16 | End: 2023-11-16

## 2023-11-16 RX ORDER — SODIUM CHLORIDE, SODIUM LACTATE, POTASSIUM CHLORIDE, CALCIUM CHLORIDE 600; 310; 30; 20 MG/100ML; MG/100ML; MG/100ML; MG/100ML
125 INJECTION, SOLUTION INTRAVENOUS CONTINUOUS
Status: DISCONTINUED | OUTPATIENT
Start: 2023-11-16 | End: 2023-11-16

## 2023-11-16 RX ADMIN — SODIUM CHLORIDE, SODIUM LACTATE, POTASSIUM CHLORIDE, CALCIUM CHLORIDE: 600; 310; 30; 20 INJECTION, SOLUTION INTRAVENOUS at 11:03

## 2023-11-16 RX ADMIN — LIDOCAINE HYDROCHLORIDE 100 MG: 20 INJECTION, SOLUTION EPIDURAL; INFILTRATION; INTRACAUDAL; PERINEURAL at 11:07

## 2023-11-16 RX ADMIN — PROPOFOL 100 MG: 10 INJECTION, EMULSION INTRAVENOUS at 11:11

## 2023-11-16 RX ADMIN — PROPOFOL 100 MG: 10 INJECTION, EMULSION INTRAVENOUS at 11:15

## 2023-11-16 RX ADMIN — PROPOFOL 200 MG: 10 INJECTION, EMULSION INTRAVENOUS at 11:07

## 2023-11-16 NOTE — ANESTHESIA POSTPROCEDURE EVALUATION
Post-Op Assessment Note    CV Status:  Stable  Pain Score: 0    Pain management: adequate       Mental Status:  Alert and awake   Hydration Status:  Euvolemic   PONV Controlled:  Controlled   Airway Patency:  Patent     Post Op Vitals Reviewed: Yes    No anethesia notable event occurred.     Staff: CRNA               BP   99/50   Temp   98.0   Pulse  71   Resp   18   SpO2   100

## 2023-11-16 NOTE — INTERVAL H&P NOTE
H&P reviewed. After examining the patient I find no changes in the patients condition since the H&P had been written.     Vitals:    11/16/23 1044   BP: 133/69   Pulse: 61   Resp: 18   Temp: 97.5 °F (36.4 °C)   SpO2: 100%

## 2023-11-16 NOTE — ANESTHESIA PREPROCEDURE EVALUATION
Procedure:  COLONOSCOPY  EGD    Relevant Problems   ANESTHESIA   (+) PONV (postoperative nausea and vomiting)      CARDIO   (+) Migraine      GI/HEPATIC   (+) GERD (gastroesophageal reflux disease)      HEMATOLOGY   (+) Iron deficiency anemia      NEURO/PSYCH   (+) Anxiety   (+) Migraine      Endocrine   (+) PCOS (polycystic ovarian syndrome)      Other   (+) ADHD        Physical Exam    Airway    Mallampati score: II  TM Distance: >3 FB  Neck ROM: full     Dental   No notable dental hx     Cardiovascular      Pulmonary      Other Findings  post-pubertal.      Anesthesia Plan  ASA Score- 2     Anesthesia Type- IV sedation with anesthesia with ASA Monitors. Additional Monitors:     Airway Plan:            Plan Factors-Exercise tolerance (METS): >4 METS. Chart reviewed. EKG reviewed. Existing labs reviewed. Patient summary reviewed. Patient is not a current smoker. Induction- intravenous. Postoperative Plan-     Informed Consent- Anesthetic plan and risks discussed with patient. I personally reviewed this patient with the CRNA. Discussed and agreed on the Anesthesia Plan with the CRNA. Petrona San

## 2023-11-24 PROCEDURE — 88305 TISSUE EXAM BY PATHOLOGIST: CPT | Performed by: STUDENT IN AN ORGANIZED HEALTH CARE EDUCATION/TRAINING PROGRAM

## 2023-11-27 ENCOUNTER — OFFICE VISIT (OUTPATIENT)
Dept: RHEUMATOLOGY | Facility: CLINIC | Age: 28
End: 2023-11-27
Payer: COMMERCIAL

## 2023-11-27 ENCOUNTER — HOSPITAL ENCOUNTER (OUTPATIENT)
Dept: CT IMAGING | Facility: HOSPITAL | Age: 28
Discharge: HOME | End: 2023-11-27
Payer: COMMERCIAL

## 2023-11-27 VITALS
WEIGHT: 131.8 LBS | BODY MASS INDEX: 21.96 KG/M2 | DIASTOLIC BLOOD PRESSURE: 70 MMHG | HEIGHT: 65 IN | SYSTOLIC BLOOD PRESSURE: 102 MMHG

## 2023-11-27 DIAGNOSIS — R53.83 OTHER FATIGUE: ICD-10-CM

## 2023-11-27 DIAGNOSIS — M25.50 ARTHRALGIA, UNSPECIFIED JOINT: ICD-10-CM

## 2023-11-27 DIAGNOSIS — K21.9 GASTROESOPHAGEAL REFLUX DISEASE WITHOUT ESOPHAGITIS: ICD-10-CM

## 2023-11-27 DIAGNOSIS — R19.7 DIARRHEA, UNSPECIFIED TYPE: ICD-10-CM

## 2023-11-27 DIAGNOSIS — D50.9 IRON DEFICIENCY ANEMIA, UNSPECIFIED IRON DEFICIENCY ANEMIA TYPE: ICD-10-CM

## 2023-11-27 DIAGNOSIS — R76.8 CENTROMERE ANTIBODY POSITIVE: ICD-10-CM

## 2023-11-27 DIAGNOSIS — M35.00 SICCA, UNSPECIFIED TYPE (HCC): ICD-10-CM

## 2023-11-27 DIAGNOSIS — R76.8 ANA POSITIVE: Primary | ICD-10-CM

## 2023-11-27 DIAGNOSIS — I73.00 RAYNAUD'S DISEASE WITHOUT GANGRENE: ICD-10-CM

## 2023-11-27 PROCEDURE — 74177 CT ABD & PELVIS W/CONTRAST: CPT

## 2023-11-27 PROCEDURE — G1004 CDSM NDSC: HCPCS

## 2023-11-27 PROCEDURE — 99213 OFFICE O/P EST LOW 20 MIN: CPT | Performed by: PHYSICIAN ASSISTANT

## 2023-11-27 RX ADMIN — IOHEXOL 100 ML: 350 INJECTION, SOLUTION INTRAVENOUS at 18:21

## 2023-11-27 NOTE — PROGRESS NOTES
Assessment and Plan:   Ms. Ruma Cisneros is a 52-year-old female who presents for rheumatology follow-up of +TEO 1:80 and +centromere ab (1.2). The patient presented approximately 1.5 months ago with complaint of fatigue, polyarthralgia with stiffness and swelling, intermittent salivary gland swelling, dry eye and dry mouth, Raynaud's phenomenon, diarrhea, GERD, and family history of SLE in paternal grandmother. Complete serologic workup revealed +TEO 1:80, +centromere ab (1.2), +thyroglobulin ab (2), and +Iona (70, suggestive of Crohn's disease, despite normal colonoscopy 11/16/23). Lip biopsy was unremarkable for Sjogren's 10/31/23. We discussed today that her workup does not reveal a specific rheumatological diagnosis at this time and therefore we will take a monitoring approach. The low titer TEO could potentially be due to the positive thyroid antibody and the low titer centromere antibody is nonspecific at this time. She will update with me in any changes in symptoms between now and next visit. Continue follow-up with GI and OB/GYN as scheduled for other issues. Follow-up in 8 months. Plan:  Diagnoses and all orders for this visit:    TEO positive    Sicca, unspecified type ([address])    Raynaud's disease without gangrene    Centromere antibody positive    Gastroesophageal reflux disease without esophagitis    Other fatigue    Diarrhea, unspecified type    Arthralgia, unspecified joint        I have personally reviewed prior notes, recent laboratory results, and pertinent films in PACS. Activities as tolerated. Exercise: try to maintain a low impact exercise regimen as much as possible. Walk for 30 minutes a day for at least 3 days a week. Continue other medications as prescribed by PCP and other specialists.        RTC in 8 mo    Rheumatic Disease Summary:  +TEO 1:80, +Centromere ab (1.2)  -Initial visit 10/12/23:  fatigue, polyarthralgia with stiffness and swelling, intermittent salivary gland swelling, severe dry eye and dry mouth, Raynaud's phenomenon, diarrhea, GERD, and has a family history of SLE in paternal grandmother. Recent labs as ordered by the primary team revealed TEO 1:80 in a speckled pattern and otherwise unremarkable in terms of dsDNA, SSA, SSB, Joseph, RNP, RF, CCP. Arrange for lip bx to r/o SS and refer to GI for c/o diarrhea and GERD.  -Labs 10/2023: +TEO 1:80, +centromere ab (1.2), +thyroglobulin ab (2), and +Iona (70, suggestive of Crohn's disease). Neg C3, C4, ESR, Hep panel, B2 glyco, HLA-B27, hisotne, cardiolipin, A1 antitrypsin, cryo, SCL-70, mitochondrial ab, U1RNP, RNA poly III, Th/To  -Salivary gland bx 10/31/23: neg for SS  -Colonoscopy 11/16/23: normal  -Visit 11/27/23: W/u does not reveal a specific rheumatological diagnosis at this time and therefore we will take a monitoring approach. The low titer TEO could potentially be due to the positive thyroid antibody and the low titer centromere antibody is nonspecific at this time. 2.  Other comorbidities: iron deficiency anemia, dysmenorrhea      HPI  Ms. Deandre Patiño is a 27-year-old female who presents for rheumatology follow-up of +TEO 1:80 and +centromere ab (1.2). The patient states she had colonoscopy and the findings were unremarkable, but she still has diarrhea that is uncontrolled and had blood clots per rectum on 11/4 when she reported to the ED. She has another scan today. She c/o fatigue. Sometimes tingling/numbness in the right leg/foot. Very painful periods. Also following with OB/GYN. The following portions of the patient's history were reviewed and updated as appropriate: allergies, current medications, past family history, past medical history, past social history, past surgical history and problem list.      Review of Systems  Constitutional: +fatigue. .  ENT/Mouth: +dry eye, dry mouth.  Negative for hearing changes, ear pain, nasal congestion, sinus pain, hoarseness, sore throat, rhinorrhea, swallowing difficulty. Eyes: Negative for pain, redness, discharge, vision changes. Cardiovascular: Negative for chest pain, SOB, palpitations. Respiratory: Negative for cough, sputum, wheezing, dyspnea. Gastrointestinal: +diarrhea, pain, heartburn. Negative for nausea, vomiting, constipation  Genitourinary: Negative for dysuria, urinary frequency, hematuria. Musculoskeletal: As per HPI. Skin: +color changes. No skin rash   Neuro: +numbness, tingling LLE  Psych: Negative for anxiety, depression. Heme/Lymph: Negative for easy bruising, bleeding, lymphadenopathy. Past Medical History:   Diagnosis Date    Abdominal pain     with nausea    ADHD     Anemia     Anxiety     Chlamydia     Diarrhea     GERD (gastroesophageal reflux disease)     Migraine     Mononucleosis     Ovarian cyst     PONV (postoperative nausea and vomiting)        Past Surgical History:   Procedure Laterality Date    SINUS SURGERY      WISDOM TOOTH EXTRACTION         Social History     Socioeconomic History    Marital status: Single     Spouse name: Not on file    Number of children: Not on file    Years of education: Not on file    Highest education level: Not on file   Occupational History    Not on file   Tobacco Use    Smoking status: Never    Smokeless tobacco: Never   Vaping Use    Vaping Use: Never used   Substance and Sexual Activity    Alcohol use: Yes     Alcohol/week: 1.0 standard drink of alcohol     Types: 1 Glasses of wine per week     Comment: socially weekly    Drug use: Never    Sexual activity: Yes     Partners: Male     Birth control/protection: I.U.D.      Comment: history STD-chlamydia   Other Topics Concern    Not on file   Social History Narrative    Lives in house with BF            Currently in college        Not working while in school     Social Determinants of Health     Financial Resource Strain: Not on file   Food Insecurity: Not on file   Transportation Needs: Not on file   Physical Activity: Not on file   Stress: Not on file   Social Connections: Not on file   Intimate Partner Violence: Not on file   Housing Stability: Not on file       Family History   Problem Relation Age of Onset    Hypertension Mother     Hypertension Father     No Known Problems Brother     Hypertension Maternal Grandmother     Cancer Maternal Grandfather         bladder    Lupus Paternal Grandmother     Diabetes Paternal Grandmother     Alzheimer's disease Paternal Grandfather        No Known Allergies      Current Outpatient Medications:     Restasis 0.05 % ophthalmic emulsion, , Disp: , Rfl:     Cyanocobalamin 5000 MCG SUBL, Place under the tongue (Patient not taking: Reported on 11/9/2023), Disp: , Rfl:     dicyclomine (BENTYL) 10 mg capsule, Take 1 capsule (10 mg total) by mouth 3 (three) times a day as needed (abdominal cramping) (Patient not taking: Reported on 11/13/2023), Disp: 40 capsule, Rfl: 5    Fe Bisgly-Vit C-Vit B12-FA 28-60-0.008-0.4 MG CAPS, Take by mouth (Patient not taking: Reported on 11/9/2023), Disp: , Rfl:     Ginkgo Biloba (Ermalinda Olp PO), Take by mouth (Patient not taking: Reported on 11/9/2023), Disp: , Rfl:     Magnesium Gluconate 550 MG TABS, Take 400 mg by mouth 2 (two) times a day (Patient not taking: Reported on 11/9/2023), Disp: , Rfl:     VITAMIN D PO, Take by mouth (Patient not taking: Reported on 11/13/2023), Disp: , Rfl:       Objective:    Vitals:    11/27/23 0822   BP: 102/70   Weight: 59.8 kg (131 lb 12.8 oz)   Height: 5' 5" (1.651 m)       Physical Exam  General: Well appearing, well nourished, in no acute distress. Oriented x 3, normal mood and affect. Ambulating without difficulty. Skin: Good turgor, no rash, unusual bruising or prominent lesions. Hair: Normal texture and distribution. Nails: Normal color, no deformities. HEENT:  Head: Normocephalic, atraumatic. Eyes: Conjunctiva clear, sclera non-icteric, EOM intact. Nose: No external lesions, mucosa non-inflamed.   Mouth: Mucous membranes moist, no mucosal lesions. Neck: Supple  Musculoskeletal:   No asymmetry or deformities noted of bilateral upper and lower extremities. Neurologic: Alert and oriented. No focal neurological deficits appreciated. Psychiatric: Normal mood and affect.        Sveta Saenz PA-C  Rheumatology

## 2023-12-15 ENCOUNTER — OFFICE VISIT (OUTPATIENT)
Dept: FAMILY MEDICINE CLINIC | Facility: MEDICAL CENTER | Age: 28
End: 2023-12-15
Payer: COMMERCIAL

## 2023-12-15 VITALS
TEMPERATURE: 97.9 F | HEART RATE: 64 BPM | BODY MASS INDEX: 22.13 KG/M2 | OXYGEN SATURATION: 99 % | WEIGHT: 132.8 LBS | SYSTOLIC BLOOD PRESSURE: 114 MMHG | HEIGHT: 65 IN | DIASTOLIC BLOOD PRESSURE: 76 MMHG

## 2023-12-15 DIAGNOSIS — J01.00 ACUTE MAXILLARY SINUSITIS, RECURRENCE NOT SPECIFIED: Primary | ICD-10-CM

## 2023-12-15 PROCEDURE — 99213 OFFICE O/P EST LOW 20 MIN: CPT

## 2023-12-15 RX ORDER — FLUCONAZOLE 150 MG/1
150 TABLET ORAL ONCE
Qty: 1 TABLET | Refills: 0 | Status: SHIPPED | OUTPATIENT
Start: 2023-12-15 | End: 2023-12-15

## 2023-12-15 RX ORDER — AMOXICILLIN AND CLAVULANATE POTASSIUM 875; 125 MG/1; MG/1
1 TABLET, FILM COATED ORAL EVERY 12 HOURS SCHEDULED
Qty: 20 TABLET | Refills: 0 | Status: SHIPPED | OUTPATIENT
Start: 2023-12-15 | End: 2023-12-25

## 2023-12-15 RX ORDER — SACCHAROMYCES BOULARDII 250 MG
250 CAPSULE ORAL 2 TIMES DAILY
Qty: 20 CAPSULE | Refills: 0 | Status: SHIPPED | OUTPATIENT
Start: 2023-12-15 | End: 2023-12-25

## 2023-12-15 NOTE — PROGRESS NOTES
Assessment/Plan:    Symptom directed treatment as below. Prescription for Augmentin, Florastor and fluconazole sent to pharmacy. Advised to call the office if symptoms worsen or fail to improve. 1. Acute maxillary sinusitis, recurrence not specified  Augmentin as directed below. Florastor as directed while on Augmentin. Fluconazole sent to pharmacy per patient request due to yeast infections with antibiotic use. Advised about use of Afrin or Moises-Synephrine as needed for congestion relief x 3 days then switch to Flonase daily. Return if symptoms worsen or fail to improve. - amoxicillin-clavulanate (AUGMENTIN) 875-125 mg per tablet; Take 1 tablet by mouth every 12 (twelve) hours for 10 days  Dispense: 20 tablet; Refill: 0  - fluconazole (DIFLUCAN) 150 mg tablet; Take 1 tablet (150 mg total) by mouth once for 1 dose  Dispense: 1 tablet; Refill: 0  - saccharomyces boulardii (FLORASTOR) 250 mg capsule; Take 1 capsule (250 mg total) by mouth 2 (two) times a day for 10 days  Dispense: 20 capsule; Refill: 0      Subjective:      Patient ID: Raysa Ponce is a 33 y.o. female. 21-year-old female presents with complaints of cough, congestion, sinus pain and pressure, left ear pain x 3 weeks. Denies fever, body aches, sore throat. 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 Taking advil cold and sinus without much relief. The following portions of the patient's history were reviewed and updated as appropriate: allergies, current medications, past medical history, past social history, past surgical history, and problem list.    Review of Systems   Constitutional: Negative. HENT:  Positive for congestion, ear pain (left), sinus pressure and sinus pain. Eyes: Negative. Respiratory:  Positive for cough. Cardiovascular: Negative. Gastrointestinal: Negative. Endocrine: Negative. Genitourinary: Negative.     Musculoskeletal: Negative. Skin: Negative. Allergic/Immunologic: Negative. Neurological: Negative. Hematological: Negative. Psychiatric/Behavioral: Negative. Objective:      /76 (BP Location: Left arm, Patient Position: Sitting, Cuff Size: Adult)   Pulse 64   Temp 97.9 °F (36.6 °C)   Ht 5' 5" (1.651 m)   Wt 60.2 kg (132 lb 12.8 oz)   LMP 10/14/2023 (Exact Date) Comment: preg test negative  SpO2 99%   BMI 22.10 kg/m²          Physical Exam  Vitals and nursing note reviewed. Constitutional:       General: She is not in acute distress. Appearance: Normal appearance. She is normal weight. She is not ill-appearing. HENT:      Head: Normocephalic and atraumatic. Right Ear: Tympanic membrane, ear canal and external ear normal.      Left Ear: Tympanic membrane, ear canal and external ear normal.      Nose: Congestion present. Right Sinus: Maxillary sinus tenderness present. No frontal sinus tenderness. Left Sinus: Maxillary sinus tenderness present. No frontal sinus tenderness. Mouth/Throat:      Mouth: Mucous membranes are moist.      Pharynx: Oropharynx is clear. No oropharyngeal exudate or posterior oropharyngeal erythema. Cardiovascular:      Rate and Rhythm: Normal rate and regular rhythm. Pulses: Normal pulses. Heart sounds: Normal heart sounds. Pulmonary:      Effort: Pulmonary effort is normal. No respiratory distress. Breath sounds: Normal breath sounds. No stridor. No wheezing, rhonchi or rales. Musculoskeletal:      Cervical back: Normal range of motion and neck supple. Lymphadenopathy:      Cervical: No cervical adenopathy. Skin:     General: Skin is warm and dry. Neurological:      General: No focal deficit present. Mental Status: She is alert and oriented to person, place, and time. Mental status is at baseline. Psychiatric:         Mood and Affect: Mood normal.         Behavior: Behavior normal.         Thought Content:  Thought content normal.                    ROSA M Zayas

## 2024-02-07 ENCOUNTER — NURSE TRIAGE (OUTPATIENT)
Dept: RHEUMATOLOGY | Facility: CLINIC | Age: 29
End: 2024-02-07

## 2024-02-07 NOTE — TELEPHONE ENCOUNTER
Agree with gypsyal by PCP. Can also come for sooner appt with me if she would like to discuss further.

## 2024-02-07 NOTE — TELEPHONE ENCOUNTER
Patient calling stating she is returning a call. There was no VM left. Advised her of Javier's recommendation. She states she did send a message to her PCP and will follow up with them first and if she is having other issues, will then contact our office to come in for a sooner visit with Javier. All questions were answered and pt confirmed understanding.

## 2024-02-07 NOTE — TELEPHONE ENCOUNTER
C/O extreme fatigue, hard to stay awake.    Pt wanted to update provider on her symptoms since last OV.  Advised patient to tell her PCP of her s/s pertaining to eating and fatigue as this may not be of rheumatological nature and have PCP eval too.  Pt in agreement and no further questions at this time.    Please advise.    Reason for Disposition   Information only question and nurse able to answer    Answer Assessment - Initial Assessment Questions  1. REASON FOR CALL or QUESTION:   Pt wanted to update provider on her symptoms since last OV.  Advised patient to tell her PCP of her s/s pertaining to eating and fatigue as this may not be of rheumatological nature and have PCP eval too.  Pt in agreement and no further questions at this time.    Please advise.    Protocols used: Information Only Call - No Triage-ADULT-[place]

## 2024-02-07 NOTE — TELEPHONE ENCOUNTER
----- Message from Maryse Reid sent at 2/7/2024  1:22 PM EST -----  Regarding: Fatigue   Contact: [phone]  Hi! So I just don’t know what to do, I keep getting these aggressive fatigue moments throughout the day where I can not keep my eyes open and they happen very randomly and having a hard time functioning throughout the day.     And also after I eat, I get hot, clammy, and dizzy. And fatigued. Usually it’s immediately- 15/20 minutes after I eat.     Sorry to be a pain I’m just not sure what I can do, I tried caffeine, diet change, exercise, nothing seems to help

## 2024-02-09 ENCOUNTER — TELEPHONE (OUTPATIENT)
Age: 29
End: 2024-02-09

## 2024-02-09 ENCOUNTER — APPOINTMENT (OUTPATIENT)
Dept: LAB | Facility: MEDICAL CENTER | Age: 29
End: 2024-02-09
Payer: COMMERCIAL

## 2024-02-09 ENCOUNTER — OFFICE VISIT (OUTPATIENT)
Dept: FAMILY MEDICINE CLINIC | Facility: MEDICAL CENTER | Age: 29
End: 2024-02-09
Payer: COMMERCIAL

## 2024-02-09 ENCOUNTER — APPOINTMENT (OUTPATIENT)
Dept: LAB | Facility: HOSPITAL | Age: 29
End: 2024-02-09

## 2024-02-09 VITALS
TEMPERATURE: 98.4 F | SYSTOLIC BLOOD PRESSURE: 118 MMHG | BODY MASS INDEX: 22.08 KG/M2 | RESPIRATION RATE: 16 BRPM | DIASTOLIC BLOOD PRESSURE: 68 MMHG | HEART RATE: 56 BPM | HEIGHT: 65 IN | OXYGEN SATURATION: 96 % | WEIGHT: 132.5 LBS

## 2024-02-09 DIAGNOSIS — R42 DIZZINESS: ICD-10-CM

## 2024-02-09 DIAGNOSIS — R76.8 CENTROMERE ANTIBODY POSITIVE: Primary | ICD-10-CM

## 2024-02-09 DIAGNOSIS — R76.8 CENTROMERE ANTIBODY POSITIVE: ICD-10-CM

## 2024-02-09 DIAGNOSIS — R53.83 FATIGUE, UNSPECIFIED TYPE: Primary | ICD-10-CM

## 2024-02-09 DIAGNOSIS — R53.83 FATIGUE, UNSPECIFIED TYPE: ICD-10-CM

## 2024-02-09 DIAGNOSIS — R07.89 INTERMITTENT LEFT-SIDED CHEST PAIN: ICD-10-CM

## 2024-02-09 LAB
25(OH)D3 SERPL-MCNC: 22 NG/ML (ref 30–100)
ALBUMIN SERPL BCP-MCNC: 4.7 G/DL (ref 3.5–5)
ALP SERPL-CCNC: 48 U/L (ref 34–104)
ALT SERPL W P-5'-P-CCNC: 6 U/L (ref 7–52)
ANION GAP SERPL CALCULATED.3IONS-SCNC: 9 MMOL/L
AST SERPL W P-5'-P-CCNC: 10 U/L (ref 13–39)
BASOPHILS # BLD AUTO: 0.05 THOUSANDS/ÂΜL (ref 0–0.1)
BASOPHILS NFR BLD AUTO: 1 % (ref 0–1)
BILIRUB SERPL-MCNC: 0.66 MG/DL (ref 0.2–1)
BUN SERPL-MCNC: 8 MG/DL (ref 5–25)
CALCIUM SERPL-MCNC: 10 MG/DL (ref 8.4–10.2)
CHLORIDE SERPL-SCNC: 104 MMOL/L (ref 96–108)
CO2 SERPL-SCNC: 27 MMOL/L (ref 21–32)
CREAT SERPL-MCNC: 0.68 MG/DL (ref 0.6–1.3)
EOSINOPHIL # BLD AUTO: 0.06 THOUSAND/ÂΜL (ref 0–0.61)
EOSINOPHIL NFR BLD AUTO: 1 % (ref 0–6)
ERYTHROCYTE [DISTWIDTH] IN BLOOD BY AUTOMATED COUNT: 11.9 % (ref 11.6–15.1)
FERRITIN SERPL-MCNC: 6 NG/ML (ref 11–307)
GFR SERPL CREATININE-BSD FRML MDRD: 120 ML/MIN/1.73SQ M
GLUCOSE P FAST SERPL-MCNC: 79 MG/DL (ref 65–99)
HCT VFR BLD AUTO: 37.7 % (ref 34.8–46.1)
HGB BLD-MCNC: 13.2 G/DL (ref 11.5–15.4)
IMM GRANULOCYTES # BLD AUTO: 0.01 THOUSAND/UL (ref 0–0.2)
IMM GRANULOCYTES NFR BLD AUTO: 0 % (ref 0–2)
INSULIN SERPL-ACNC: 2.18 UIU/ML (ref 1.9–23)
IRON SATN MFR SERPL: 36 % (ref 15–50)
IRON SERPL-MCNC: 152 UG/DL (ref 50–212)
LYMPHOCYTES # BLD AUTO: 1.99 THOUSANDS/ÂΜL (ref 0.6–4.47)
LYMPHOCYTES NFR BLD AUTO: 43 % (ref 14–44)
MCH RBC QN AUTO: 31 PG (ref 26.8–34.3)
MCHC RBC AUTO-ENTMCNC: 35 G/DL (ref 31.4–37.4)
MCV RBC AUTO: 89 FL (ref 82–98)
MONOCYTES # BLD AUTO: 0.34 THOUSAND/ÂΜL (ref 0.17–1.22)
MONOCYTES NFR BLD AUTO: 7 % (ref 4–12)
NEUTROPHILS # BLD AUTO: 2.14 THOUSANDS/ÂΜL (ref 1.85–7.62)
NEUTS SEG NFR BLD AUTO: 48 % (ref 43–75)
NRBC BLD AUTO-RTO: 0 /100 WBCS
PLATELET # BLD AUTO: 308 THOUSANDS/UL (ref 149–390)
PMV BLD AUTO: 10.1 FL (ref 8.9–12.7)
POTASSIUM SERPL-SCNC: 4.2 MMOL/L (ref 3.5–5.3)
PROT SERPL-MCNC: 7.1 G/DL (ref 6.4–8.4)
RBC # BLD AUTO: 4.26 MILLION/UL (ref 3.81–5.12)
SODIUM SERPL-SCNC: 140 MMOL/L (ref 135–147)
TIBC SERPL-MCNC: 417 UG/DL (ref 250–450)
TSH SERPL DL<=0.05 MIU/L-ACNC: 3.08 UIU/ML (ref 0.45–4.5)
UIBC SERPL-MCNC: 265 UG/DL (ref 155–355)
VIT B12 SERPL-MCNC: 337 PG/ML (ref 180–914)
WBC # BLD AUTO: 4.59 THOUSAND/UL (ref 4.31–10.16)

## 2024-02-09 PROCEDURE — 36415 COLL VENOUS BLD VENIPUNCTURE: CPT

## 2024-02-09 PROCEDURE — 84443 ASSAY THYROID STIM HORMONE: CPT

## 2024-02-09 PROCEDURE — 83525 ASSAY OF INSULIN: CPT

## 2024-02-09 PROCEDURE — 85025 COMPLETE CBC W/AUTO DIFF WBC: CPT

## 2024-02-09 PROCEDURE — 99214 OFFICE O/P EST MOD 30 MIN: CPT

## 2024-02-09 PROCEDURE — 82306 VITAMIN D 25 HYDROXY: CPT

## 2024-02-09 PROCEDURE — 80053 COMPREHEN METABOLIC PANEL: CPT

## 2024-02-09 PROCEDURE — 82595 ASSAY OF CRYOGLOBULIN: CPT

## 2024-02-09 PROCEDURE — 83540 ASSAY OF IRON: CPT

## 2024-02-09 PROCEDURE — 82728 ASSAY OF FERRITIN: CPT

## 2024-02-09 PROCEDURE — 82607 VITAMIN B-12: CPT

## 2024-02-09 PROCEDURE — 83550 IRON BINDING TEST: CPT

## 2024-02-09 PROCEDURE — 93000 ELECTROCARDIOGRAM COMPLETE: CPT

## 2024-02-09 NOTE — PROGRESS NOTES
Assessment/Plan:    Check labs.  POCT ECG in office unremarkable, bradycardic.  Follow up with Rheumatology as scheduled.     1. Fatigue, unspecified type  Check labs.  Ensure adequate sleep, good sleep routine.   Follow up with Rheumatology.  - Insulin, fasting; Future  - Comprehensive metabolic panel; Future  - CBC and differential; Future  - TSH, 3rd generation with Free T4 reflex; Future  - Vitamin D 25 hydroxy; Future  - Iron Panel (Includes Ferritin, Iron Sat%, Iron, and TIBC); Future  - Vitamin B12; Future    2. Dizziness  Eat small, frequent meals throughout the day.  Avoid large meals high in carb/sugars.   Ensure adequate rest and hydration.  Check labs.  - Insulin, fasting; Future  - Comprehensive metabolic panel; Future  - CBC and differential; Future  - TSH, 3rd generation with Free T4 reflex; Future  - Vitamin D 25 hydroxy; Future  - Iron Panel (Includes Ferritin, Iron Sat%, Iron, and TIBC); Future  - Vitamin B12; Future    3. Intermittent left-sided chest pain  Currently asymptomatic.  Symptoms rare, intermittent.  ECG in office- normal sinus rhythm, bradycardic.   - POCT ECG      Subjective:      Patient ID: Maryse Reid is a 28 y.o. female.    28-year-old female presents with complaints of fatigue ongoing x 3-4 years, feels as though she cannot go through the day without fighting to take a nap, usually lasts 1 hour then energy level seems to slightly improve.  She reports sleeping approximately 7 to 8 hours per night, denies difficulty falling asleep, she does report waking 1-2 times per night sweating.  She did recently switch over from working 3 12-hour shifts to 5 days/week over this past week however, fatigue symptoms started years ago.  Denies smoking, etoh use socially, denies drug use.   She also reports feeling hot, clammy and dizzy after large meals that involve high carb and sugars x 1-2 months.  She reports when she eats small frequent meals throughout the day that are higher in  protein and less and carbohydrate and sugars, she feels better.  She does tell me that she checked her blood sugar once approximately 45 to 60 minutes after eating when this occurred and reading was 72.  She reports abnormal menses, bleeding in between periods. Recently diagnosed with PCOS by obgyn.  She has not been started on Metformin as of yet.   She does have a history of iron deficiency anemia.  She tells me she discontinued her iron supplement in the past when iron levels were too high.  Last CBC within normal limits.  She is currently following with rheumatology for positive TEO which is being monitored.  Her next appointment is scheduled for July, she plans to further discuss the fatigue at that time.  She also reports left sided cp with associated palpitations that occur very randomly, approximately once every 2 weeks.  She reports symptoms last anywhere from 30 seconds to 1 and half minutes then quickly self resolved.  Symptom onset 2 months ago. Currently asymptomatic, last time she felt this way was over 1 week ago.       Fatigue  Associated symptoms include chest pain (intermittent, rare with associated palpitations.), diaphoresis (sweaty and clammy after large meals) and fatigue.       The following portions of the patient's history were reviewed and updated as appropriate: allergies, past family history, past medical history, past social history, past surgical history, and problem list.    Review of Systems   Constitutional:  Positive for diaphoresis (sweaty and clammy after large meals) and fatigue. Negative for unexpected weight change.   HENT: Negative.     Eyes: Negative.    Respiratory: Negative.     Cardiovascular:  Positive for chest pain (intermittent, rare with associated palpitations.).   Gastrointestinal: Negative.    Endocrine: Negative.    Genitourinary: Negative.    Musculoskeletal: Negative.    Skin: Negative.    Allergic/Immunologic: Negative.    Neurological:  Positive for dizziness  "(sweaty and clammy after eating).   Hematological: Negative.    Psychiatric/Behavioral: Negative.           Objective:      /68 (BP Location: Left arm, Patient Position: Sitting, Cuff Size: Standard)   Pulse 56   Temp 98.4 °F (36.9 °C)   Resp 16   Ht 5' 5\" (1.651 m)   Wt 60.1 kg (132 lb 8 oz)   SpO2 96%   BMI 22.05 kg/m²          Physical Exam  Vitals and nursing note reviewed.   Constitutional:       General: She is not in acute distress.     Appearance: Normal appearance. She is not ill-appearing.   HENT:      Head: Normocephalic and atraumatic.   Eyes:      Conjunctiva/sclera: Conjunctivae normal.   Cardiovascular:      Rate and Rhythm: Regular rhythm. Bradycardia present.      Pulses: Normal pulses.      Heart sounds: Normal heart sounds.   Pulmonary:      Effort: Pulmonary effort is normal.      Breath sounds: Normal breath sounds.   Abdominal:      General: Abdomen is flat. Bowel sounds are normal.      Palpations: Abdomen is soft.      Tenderness: There is no abdominal tenderness.   Musculoskeletal:         General: Normal range of motion.      Cervical back: Normal range of motion.      Right lower leg: No edema.      Left lower leg: No edema.   Skin:     General: Skin is warm and dry.   Neurological:      General: No focal deficit present.      Mental Status: She is alert and oriented to person, place, and time. Mental status is at baseline.      Motor: No weakness.      Gait: Gait normal.   Psychiatric:         Mood and Affect: Mood normal.         Behavior: Behavior normal.         Thought Content: Thought content normal.                    ROSA M Villarreal  "

## 2024-02-09 NOTE — TELEPHONE ENCOUNTER
Pt calling regarding ordered blood work. States she completed lab work at outpatient lab today and was told she had one ordered from October that could only be completed at one of the hospital campuses. She is wondering if this is correct and if she can still have it done. Confirmed that Cryoglobulin was ordered in October and is still active. She will go to [facility] to complete. No further questions at this time.

## 2024-02-16 LAB — CRYOGLOB SER QL 1D COLD INC: NORMAL

## 2024-02-22 ENCOUNTER — NURSE TRIAGE (OUTPATIENT)
Age: 29
End: 2024-02-22

## 2024-02-22 ENCOUNTER — TELEPHONE (OUTPATIENT)
Dept: OBGYN CLINIC | Facility: MEDICAL CENTER | Age: 29
End: 2024-02-22

## 2024-02-22 ENCOUNTER — APPOINTMENT (OUTPATIENT)
Dept: LAB | Facility: MEDICAL CENTER | Age: 29
End: 2024-02-22
Payer: COMMERCIAL

## 2024-02-22 DIAGNOSIS — Z72.51 HIGH RISK HETEROSEXUAL BEHAVIOR: Primary | ICD-10-CM

## 2024-02-22 DIAGNOSIS — Z72.51 HIGH RISK HETEROSEXUAL BEHAVIOR: ICD-10-CM

## 2024-02-22 LAB
HBV SURFACE AG SER QL: NORMAL
HIV 1+2 AB+HIV1 P24 AG SERPL QL IA: NORMAL
HIV 2 AB SERPL QL IA: NORMAL
HIV1 AB SERPL QL IA: NORMAL
HIV1 P24 AG SERPL QL IA: NORMAL
TREPONEMA PALLIDUM IGG+IGM AB [PRESENCE] IN SERUM OR PLASMA BY IMMUNOASSAY: NORMAL

## 2024-02-22 PROCEDURE — 87591 N.GONORRHOEAE DNA AMP PROB: CPT

## 2024-02-22 PROCEDURE — 86780 TREPONEMA PALLIDUM: CPT

## 2024-02-22 PROCEDURE — 36415 COLL VENOUS BLD VENIPUNCTURE: CPT

## 2024-02-22 PROCEDURE — 87491 CHLMYD TRACH DNA AMP PROBE: CPT

## 2024-02-22 PROCEDURE — 87340 HEPATITIS B SURFACE AG IA: CPT

## 2024-02-22 PROCEDURE — 87389 HIV-1 AG W/HIV-1&-2 AB AG IA: CPT

## 2024-02-22 NOTE — TELEPHONE ENCOUNTER
"Patient called in with urinary symptoms that have been occurring for the past week and a half.  Advised patient that there were not office appointments available for today but she can go to urgent care.     Patient will go to an Urgent care for urinary symptoms.  She did state that she would like STI testing as well.  Advised patient I would send message to provider to review and order labs for her.      Patient has no further questions at this time.  Will call back office if needed.      Reason for Disposition   Painful urination AND EITHER frequency or urgency    Answer Assessment - Initial Assessment Questions  1. SEVERITY: \"How bad is the pain?\"  (e.g., Scale 1-10; mild, moderate, or severe)    - MILD (1-3): complains slightly about urination hurting    - MODERATE (4-7): interferes with normal activities      - SEVERE (8-10): excruciating, unwilling or unable to urinate because of the pain       Mild    2. FREQUENCY: \"How many times have you had painful urination today?\"       3    3. PATTERN: \"Is pain present every time you urinate or just sometimes?\"       Yes    4. ONSET: \"When did the painful urination start?\"       A week and half    5. FEVER: \"Do you have a fever?\" If Yes, ask: \"What is your temperature, how was it measured, and when did it start?\"      no  6. PAST UTI: \"Have you had a urine infection before?\" If Yes, ask: \"When was the last time?\" and \"What happened that time?\"       No    7. CAUSE: \"What do you think is causing the painful urination?\"  (e.g., UTI, scratch, Herpes sore)      Unknown    8. OTHER SYMPTOMS: \"Do you have any other symptoms?\" (e.g., flank pain, vaginal discharge, genital sores, urgency, blood in urine)      Urgency, burning    9. PREGNANCY: \"Is there any chance you are pregnant?\" \"When was your last menstrual period?\"      no    Answer Assessment - Initial Assessment Questions  1. SYMPTOM: \"What's the main symptom you're concerned about?\" (e.g., frequency, incontinence)      " "Burning, foul smell odor    2. ONSET: \"When did the  symptoms  start?\"      A week and a half    3. PAIN: \"Is there any pain?\" If Yes, ask: \"How bad is it?\" (Scale: 1-10; mild, moderate, severe)      N/a    4. CAUSE: \"What do you think is causing the symptoms?\"      Unknown    5. OTHER SYMPTOMS: \"Do you have any other symptoms?\" (e.g., fever, flank pain, blood in urine, pain with urination)      Pain urination    6. PREGNANCY: \"Is there any chance you are pregnant?\" \"When was your last menstrual period?\"      no    Protocols used: Urinary Symptoms-ADULT-[place], Urination Pain - Female-ADULT-[place]    "

## 2024-02-23 LAB
C TRACH DNA SPEC QL NAA+PROBE: NEGATIVE
N GONORRHOEA DNA SPEC QL NAA+PROBE: NEGATIVE

## 2024-02-29 DIAGNOSIS — R39.9 UTI SYMPTOMS: Primary | ICD-10-CM

## 2024-03-01 ENCOUNTER — APPOINTMENT (OUTPATIENT)
Dept: LAB | Facility: MEDICAL CENTER | Age: 29
End: 2024-03-01
Payer: COMMERCIAL

## 2024-03-01 DIAGNOSIS — R39.9 UTI SYMPTOMS: ICD-10-CM

## 2024-03-01 LAB
BACTERIA UR QL AUTO: ABNORMAL /HPF
BILIRUB UR QL STRIP: NEGATIVE
CLARITY UR: CLEAR
COLOR UR: ABNORMAL
GLUCOSE UR STRIP-MCNC: NEGATIVE MG/DL
HGB UR QL STRIP.AUTO: ABNORMAL
KETONES UR STRIP-MCNC: NEGATIVE MG/DL
LEUKOCYTE ESTERASE UR QL STRIP: NEGATIVE
MUCOUS THREADS UR QL AUTO: ABNORMAL
NITRITE UR QL STRIP: NEGATIVE
NON-SQ EPI CELLS URNS QL MICRO: ABNORMAL /HPF
PH UR STRIP.AUTO: 6.5 [PH]
PROT UR STRIP-MCNC: ABNORMAL MG/DL
RBC #/AREA URNS AUTO: ABNORMAL /HPF
SP GR UR STRIP.AUTO: 1.02 (ref 1–1.03)
UROBILINOGEN UR STRIP-ACNC: <2 MG/DL
WBC #/AREA URNS AUTO: ABNORMAL /HPF

## 2024-03-01 PROCEDURE — 81001 URINALYSIS AUTO W/SCOPE: CPT

## 2024-03-01 PROCEDURE — 87086 URINE CULTURE/COLONY COUNT: CPT

## 2024-03-03 LAB — BACTERIA UR CULT: NORMAL

## 2024-03-19 ENCOUNTER — OFFICE VISIT (OUTPATIENT)
Dept: URGENT CARE | Age: 29
End: 2024-03-19
Payer: COMMERCIAL

## 2024-03-19 VITALS
OXYGEN SATURATION: 100 % | RESPIRATION RATE: 18 BRPM | HEIGHT: 65 IN | SYSTOLIC BLOOD PRESSURE: 124 MMHG | BODY MASS INDEX: 21.66 KG/M2 | TEMPERATURE: 98 F | HEART RATE: 66 BPM | WEIGHT: 130 LBS | DIASTOLIC BLOOD PRESSURE: 90 MMHG

## 2024-03-19 DIAGNOSIS — J02.8 SORE THROAT (VIRAL): ICD-10-CM

## 2024-03-19 DIAGNOSIS — B97.89 SORE THROAT (VIRAL): ICD-10-CM

## 2024-03-19 DIAGNOSIS — J02.0 STREP PHARYNGITIS: Primary | ICD-10-CM

## 2024-03-19 LAB — S PYO AG THROAT QL: POSITIVE

## 2024-03-19 PROCEDURE — 99213 OFFICE O/P EST LOW 20 MIN: CPT

## 2024-03-19 PROCEDURE — 87880 STREP A ASSAY W/OPTIC: CPT

## 2024-03-19 RX ORDER — FLUTICASONE PROPIONATE 50 MCG
1 SPRAY, SUSPENSION (ML) NASAL DAILY
Qty: 9.9 ML | Refills: 0 | Status: SHIPPED | OUTPATIENT
Start: 2024-03-19

## 2024-03-19 RX ORDER — AMOXICILLIN 500 MG/1
500 CAPSULE ORAL EVERY 12 HOURS SCHEDULED
Qty: 20 CAPSULE | Refills: 0 | Status: SHIPPED | OUTPATIENT
Start: 2024-03-19 | End: 2024-03-28

## 2024-03-19 RX ORDER — FLUCONAZOLE 150 MG/1
150 TABLET ORAL ONCE
Qty: 1 TABLET | Refills: 0 | Status: SHIPPED | OUTPATIENT
Start: 2024-03-19 | End: 2024-03-19

## 2024-03-19 NOTE — LETTER
March 19, 2024     Patient: Maryse Reid   YOB: 1995   Date of Visit: 3/19/2024       To Whom it May Concern:    Maryse Reid was seen in my clinic on 3/19/2024. She may return to work on 3/20/2024 .    If you have any questions or concerns, please don't hesitate to call.         Sincerely,          ROSA M Isabel        CC: No Recipients

## 2024-03-19 NOTE — PATIENT INSTRUCTIONS
In office strep test was Positive today.    Start antibiotics today. After 3 days, throw out your toothbrush and get a new one as not to reinfect yourself.     Please trial warm salt water gargles, Chloraseptic spray, Cepacol cough drops and warm tea with honey as needed for sore throat.        Stay hydrated.  Take tylenol or motrin as needed for pain.     Follow up with your family doctor in 5-7 days if no improvement.

## 2024-03-19 NOTE — PROGRESS NOTES
[facility]        NAME: Maryse Reid is a 28 y.o. female  : 1995    MRN: [id number]  DATE: 2024  TIME: 8:58 AM    Assessment and Plan   Strep pharyngitis [J02.0]  1. Strep pharyngitis  fluconazole (DIFLUCAN) 150 mg tablet      2. Sore throat (viral)  fluticasone (FLONASE) 50 mcg/act nasal spray    POCT rapid strep A    amoxicillin (AMOXIL) 500 mg capsule    CANCELED: Throat culture        In office strep test was Positive today.  Start antibiotics today. After 3 days, throw out your toothbrush and get a new one as not to reinfect yourself.     Diflucan for antibiotic associated vaginal yeast infection.     Please trial warm salt water gargles, Chloraseptic spray, Cepacol cough drops and warm tea with honey as needed for sore throat.     Stay hydrated.  Take tylenol or motrin as needed for pain.     Follow up with your family doctor in 5-7 days if no improvement.    Patient Instructions       Follow up with PCP in 3-5 days.  Proceed to  ER if symptoms worsen.    If tests have been performed at [facility], our office will contact you with results if changes need to be made to the care plan discussed with you at the visit.  You can review your full results on [facility].    Chief Complaint     Chief Complaint   Patient presents with   • Sore Throat     Pt has c/o sore throat and ear pressure since this morning. No fever, chills.          History of Present Illness       Woke up with sore throat this morning, with associated headache and bilateral ear pain.   No cough, congestion, runny nose, no fever or chills, no n/v/d. No sick contacts.  Denies taking anything for pain.     Sore Throat   Associated symptoms include ear pain and vomiting. Pertinent negatives include no congestion or diarrhea.       Review of Systems   Review of Systems   Constitutional:  Negative for fever.   HENT:  Positive for ear pain and sore throat. Negative for congestion, postnasal drip and rhinorrhea.     Gastrointestinal:  Positive for vomiting. Negative for diarrhea and nausea.   Skin:  Negative for rash.         Current Medications       Current Outpatient Medications:   •  amoxicillin (AMOXIL) 500 mg capsule, Take 1 capsule (500 mg total) by mouth every 12 (twelve) hours for 10 days, Disp: 20 capsule, Rfl: 0  •  Cyanocobalamin 5000 MCG SUBL, Place under the tongue, Disp: , Rfl:   •  dicyclomine (BENTYL) 10 mg capsule, Take 1 capsule (10 mg total) by mouth 3 (three) times a day as needed (abdominal cramping), Disp: 40 capsule, Rfl: 5  •  Fe Bisgly-Vit C-Vit B12-FA 28-60-0.008-0.4 MG CAPS, Take by mouth, Disp: , Rfl:   •  fluconazole (DIFLUCAN) 150 mg tablet, Take 1 tablet (150 mg total) by mouth once for 1 dose, Disp: 1 tablet, Rfl: 0  •  fluticasone (FLONASE) 50 mcg/act nasal spray, 1 spray into each nostril daily, Disp: 9.9 mL, Rfl: 0  •  Ginkgo Biloba (GINKOBA PO), Take by mouth, Disp: , Rfl:   •  Magnesium Gluconate 550 MG TABS, Take 400 mg by mouth 2 (two) times a day, Disp: , Rfl:   •  Restasis 0.05 % ophthalmic emulsion, , Disp: , Rfl:   •  VITAMIN D PO, Take by mouth, Disp: , Rfl:     Current Allergies     Allergies as of 03/19/2024   • (No Known Allergies)            The following portions of the patient's history were reviewed and updated as appropriate: allergies, current medications, past family history, past medical history, past social history, past surgical history and problem list.     Past Medical History:   Diagnosis Date   • Abdominal pain     with nausea   • ADHD    • Anemia    • Anxiety    • Chlamydia    • Diarrhea    • GERD (gastroesophageal reflux disease)    • Migraine    • Mononucleosis    • Ovarian cyst    • PONV (postoperative nausea and vomiting)        Past Surgical History:   Procedure Laterality Date   • SINUS SURGERY     • WISDOM TOOTH EXTRACTION         Family History   Problem Relation Age of Onset   • Hypertension Mother    • Hypertension Father    • No Known Problems Brother   "  • Hypertension Maternal Grandmother    • Cancer Maternal Grandfather         bladder   • Lupus Paternal Grandmother    • Diabetes Paternal Grandmother    • Alzheimer's disease Paternal Grandfather          Medications have been verified.        Objective   /90   Pulse 66   Temp 98 °F (36.7 °C)   Resp 18   Ht 5' 5\" (1.651 m)   Wt 59 kg (130 lb)   SpO2 100%   BMI 21.63 kg/m²   No LMP recorded.       Physical Exam     Physical Exam  Vitals and nursing note reviewed.   Constitutional:       General: She is not in acute distress.     Appearance: She is not ill-appearing.   HENT:      Head: Normocephalic.      Left Ear: Tympanic membrane normal.      Nose: No congestion.      Mouth/Throat:      Mouth: Mucous membranes are moist.      Pharynx: Posterior oropharyngeal erythema present.      Tonsils: No tonsillar exudate. 2+ on the right. 2+ on the left.   Eyes:      Conjunctiva/sclera: Conjunctivae normal.   Cardiovascular:      Rate and Rhythm: Normal rate and regular rhythm.      Heart sounds: Normal heart sounds.   Pulmonary:      Effort: Pulmonary effort is normal. No respiratory distress.      Breath sounds: Normal breath sounds. No stridor. No wheezing or rales.   Abdominal:      General: There is no distension.      Palpations: Abdomen is soft.   Musculoskeletal:      Cervical back: Normal range of motion.   Lymphadenopathy:      Cervical: No cervical adenopathy.   Neurological:      Mental Status: She is alert.                   "

## 2024-03-27 ENCOUNTER — HOSPITAL ENCOUNTER (EMERGENCY)
Facility: HOSPITAL | Age: 29
Discharge: HOME | End: 2024-03-27
Attending: EMERGENCY MEDICINE
Payer: COMMERCIAL

## 2024-03-27 ENCOUNTER — APPOINTMENT (EMERGENCY)
Dept: CT IMAGING | Facility: HOSPITAL | Age: 29
End: 2024-03-27
Payer: COMMERCIAL

## 2024-03-27 VITALS
TEMPERATURE: 97.9 F | WEIGHT: 132.06 LBS | DIASTOLIC BLOOD PRESSURE: 62 MMHG | RESPIRATION RATE: 16 BRPM | SYSTOLIC BLOOD PRESSURE: 149 MMHG | HEART RATE: 77 BPM | BODY MASS INDEX: 21.98 KG/M2 | OXYGEN SATURATION: 97 %

## 2024-03-27 DIAGNOSIS — J02.9 PHARYNGITIS: ICD-10-CM

## 2024-03-27 DIAGNOSIS — J01.00 MAXILLARY SINUSITIS, ACUTE: Primary | ICD-10-CM

## 2024-03-27 LAB
ANION GAP SERPL CALCULATED.3IONS-SCNC: 7 MMOL/L (ref 4–13)
BASOPHILS # BLD AUTO: 0.03 THOUSANDS/ÂΜL (ref 0–0.1)
BASOPHILS NFR BLD AUTO: 0 % (ref 0–1)
BUN SERPL-MCNC: 13 MG/DL (ref 5–25)
CALCIUM SERPL-MCNC: 8.9 MG/DL (ref 8.4–10.2)
CHLORIDE SERPL-SCNC: 104 MMOL/L (ref 96–108)
CO2 SERPL-SCNC: 27 MMOL/L (ref 21–32)
CREAT SERPL-MCNC: 0.72 MG/DL (ref 0.6–1.3)
EOSINOPHIL # BLD AUTO: 0.03 THOUSAND/ÂΜL (ref 0–0.61)
EOSINOPHIL NFR BLD AUTO: 0 % (ref 0–6)
ERYTHROCYTE [DISTWIDTH] IN BLOOD BY AUTOMATED COUNT: 12.3 % (ref 11.6–15.1)
EXT PREGNANCY TEST URINE: NEGATIVE
EXT. CONTROL: NORMAL
FLUAV RNA RESP QL NAA+PROBE: NEGATIVE
FLUBV RNA RESP QL NAA+PROBE: NEGATIVE
GFR SERPL CREATININE-BSD FRML MDRD: 115 ML/MIN/1.73SQ M
GLUCOSE SERPL-MCNC: 84 MG/DL (ref 65–140)
HCT VFR BLD AUTO: 38.6 % (ref 34.8–46.1)
HGB BLD-MCNC: 13 G/DL (ref 11.5–15.4)
IMM GRANULOCYTES # BLD AUTO: 0.03 THOUSAND/UL (ref 0–0.2)
IMM GRANULOCYTES NFR BLD AUTO: 0 % (ref 0–2)
LYMPHOCYTES # BLD AUTO: 0.71 THOUSANDS/ÂΜL (ref 0.6–4.47)
LYMPHOCYTES NFR BLD AUTO: 8 % (ref 14–44)
MCH RBC QN AUTO: 29.5 PG (ref 26.8–34.3)
MCHC RBC AUTO-ENTMCNC: 33.7 G/DL (ref 31.4–37.4)
MCV RBC AUTO: 88 FL (ref 82–98)
MONOCYTES # BLD AUTO: 0.73 THOUSAND/ÂΜL (ref 0.17–1.22)
MONOCYTES NFR BLD AUTO: 8 % (ref 4–12)
NEUTROPHILS # BLD AUTO: 7.27 THOUSANDS/ÂΜL (ref 1.85–7.62)
NEUTS SEG NFR BLD AUTO: 84 % (ref 43–75)
NRBC BLD AUTO-RTO: 0 /100 WBCS
PLATELET # BLD AUTO: 237 THOUSANDS/UL (ref 149–390)
PMV BLD AUTO: 9.6 FL (ref 8.9–12.7)
POTASSIUM SERPL-SCNC: 3.5 MMOL/L (ref 3.5–5.3)
RBC # BLD AUTO: 4.4 MILLION/UL (ref 3.81–5.12)
RSV RNA RESP QL NAA+PROBE: NEGATIVE
SARS-COV-2 RNA RESP QL NAA+PROBE: NEGATIVE
SODIUM SERPL-SCNC: 138 MMOL/L (ref 135–147)
WBC # BLD AUTO: 8.8 THOUSAND/UL (ref 4.31–10.16)

## 2024-03-27 PROCEDURE — 85025 COMPLETE CBC W/AUTO DIFF WBC: CPT

## 2024-03-27 PROCEDURE — 36415 COLL VENOUS BLD VENIPUNCTURE: CPT

## 2024-03-27 PROCEDURE — 81025 URINE PREGNANCY TEST: CPT

## 2024-03-27 PROCEDURE — 96361 HYDRATE IV INFUSION ADD-ON: CPT

## 2024-03-27 PROCEDURE — 99284 EMERGENCY DEPT VISIT MOD MDM: CPT

## 2024-03-27 PROCEDURE — 96374 THER/PROPH/DIAG INJ IV PUSH: CPT

## 2024-03-27 PROCEDURE — 96376 TX/PRO/DX INJ SAME DRUG ADON: CPT

## 2024-03-27 PROCEDURE — 96375 TX/PRO/DX INJ NEW DRUG ADDON: CPT

## 2024-03-27 PROCEDURE — 0241U HB NFCT DS VIR RESP RNA 4 TRGT: CPT

## 2024-03-27 PROCEDURE — 70491 CT SOFT TISSUE NECK W/DYE: CPT

## 2024-03-27 PROCEDURE — 80048 BASIC METABOLIC PNL TOTAL CA: CPT

## 2024-03-27 PROCEDURE — 99285 EMERGENCY DEPT VISIT HI MDM: CPT | Performed by: EMERGENCY MEDICINE

## 2024-03-27 PROCEDURE — 87070 CULTURE OTHR SPECIMN AEROBIC: CPT

## 2024-03-27 RX ORDER — KETOROLAC TROMETHAMINE 10 MG/1
10 TABLET, FILM COATED ORAL ONCE
Status: DISCONTINUED | OUTPATIENT
Start: 2024-03-27 | End: 2024-03-27

## 2024-03-27 RX ORDER — DEXAMETHASONE SODIUM PHOSPHATE 10 MG/ML
10 INJECTION, SOLUTION INTRAMUSCULAR; INTRAVENOUS ONCE
Status: COMPLETED | OUTPATIENT
Start: 2024-03-27 | End: 2024-03-27

## 2024-03-27 RX ORDER — KETOROLAC TROMETHAMINE 30 MG/ML
15 INJECTION, SOLUTION INTRAMUSCULAR; INTRAVENOUS ONCE
Status: COMPLETED | OUTPATIENT
Start: 2024-03-27 | End: 2024-03-27

## 2024-03-27 RX ORDER — AMOXICILLIN AND CLAVULANATE POTASSIUM 875; 125 MG/1; MG/1
1 TABLET, FILM COATED ORAL ONCE
Status: COMPLETED | OUTPATIENT
Start: 2024-03-27 | End: 2024-03-27

## 2024-03-27 RX ORDER — AMOXICILLIN AND CLAVULANATE POTASSIUM 875; 125 MG/1; MG/1
1 TABLET, FILM COATED ORAL EVERY 12 HOURS SCHEDULED
Qty: 14 TABLET | Refills: 0 | Status: SHIPPED | OUTPATIENT
Start: 2024-03-27 | End: 2024-03-28

## 2024-03-27 RX ADMIN — DEXAMETHASONE SODIUM PHOSPHATE 10 MG: 10 INJECTION INTRAMUSCULAR; INTRAVENOUS at 08:23

## 2024-03-27 RX ADMIN — AMOXICILLIN AND CLAVULANATE POTASSIUM 1 TABLET: 875; 125 TABLET, FILM COATED ORAL at 11:11

## 2024-03-27 RX ADMIN — KETOROLAC TROMETHAMINE 15 MG: 30 INJECTION, SOLUTION INTRAMUSCULAR; INTRAVENOUS at 08:22

## 2024-03-27 RX ADMIN — SODIUM CHLORIDE 1000 ML: 0.9 INJECTION, SOLUTION INTRAVENOUS at 11:15

## 2024-03-27 RX ADMIN — KETOROLAC TROMETHAMINE 15 MG: 30 INJECTION, SOLUTION INTRAMUSCULAR; INTRAVENOUS at 11:15

## 2024-03-27 RX ADMIN — IOHEXOL 80 ML: 350 INJECTION, SOLUTION INTRAVENOUS at 09:31

## 2024-03-27 NOTE — Clinical Note
Maryse Reid was seen and treated in our emergency department on 3/27/2024.                Diagnosis:     Maryse  may return to work on return date.    She may return on this date: 03/29/2024         If you have any questions or concerns, please don't hesitate to call.      Jesús Ochoa MD    ______________________________           _______________          _______________  Hospital Representative                              Date                                Time

## 2024-03-27 NOTE — ED ATTENDING ATTESTATION
3/27/2024  ILefty DO, saw and evaluated the patient. I have discussed the patient with the resident/non-physician practitioner and agree with the resident's/non-physician practitioner's findings, Plan of Care, and MDM as documented in the resident's/non-physician practitioner's note, except where noted. All available labs and Radiology studies were reviewed.  I was present for key portions of any procedure(s) performed by the resident/non-physician practitioner and I was immediately available to provide assistance.       At this point I agree with the current assessment done in the Emergency Department.  I have conducted an independent evaluation of this patient a history and physical is as follows:    Patient is a 28-year-old female with a history of suspected autoimmune thyroiditis who presents with fever and sore throat.  Patient states that she was diagnosed with strep throat approximately 8 days ago.  She was started on amoxicillin and initially had improvement.  However she began to have a recurrent sore throat, particularly last evening.  She describes a sore throat with generalized myalgias.  She states that her neck and lower back are particularly painful.  Describes a headache and fevers.  She took Tylenol at midnight last evening.  She has not taken any medication this morning.    On exam, patient is in no acute distress.  Heart is regular rate and rhythm.  Breath sounds normal.  Generalized erythema of the posterior pharynx.  Bilateral tonsillar swelling.  Uvula midline.  No cervical lymphadenopathy.  Neck supple.  No meningismus.  Abdomen is soft, nontender, nondistended.  No rebound or guarding.    ED workup negative for COVID/flu/RSV.  CT Negative for deep space infection in the neck.  Patient is currently on amoxicillin for strep pharyngitis.  Will switch to Augmentin.  Patient also received a dose of Decadron in the emergency department.  She is stable for discharge and outpatient  "follow-up.  She agrees to return to ED if symptoms worsen.    Portions of the above record have been created with voice recognition software.  Occasional wrong word or \"sound alike\" substitutions may have occurred due to the inherent limitations of voice recognition software.  Read the chart carefully and recognize, using context, where substitutions may have occurred.      ED Course         Critical Care Time  Procedures      "

## 2024-03-27 NOTE — ED PROVIDER NOTES
History  Chief Complaint   Patient presents with    Sore Throat - Complicated     Pt reports strep since last week, on abx since last tuesday, states worsening last night, back/neck pain, neck stiffness, sore throat, fevers last night, tylenol last at 0000     (Maryse Reid) Maryse Reid is a 28 y.o. female     They presented to the emergency department on March 27, 2024. Patient presents with:   Pt reports strep throat since last week, on amoxicillin day 8 that improved the first 24 hours,  states worsening last night, back/neck pain, neck stiffness, fevers last night, tylenol last at 0000. Fever was responsive to tylenol. Patient is currently being worked up for possible hashimoto's thyroiditis. Patient gets yearly strep infections for the last 15 years that usually resolve with amoxicillin. The patient smokes infrequently. Patient also complains of periorbital headache bilaterally that is non-radiating. The headache is worsened with light. The patient denies cough, chest pain, nausea, vomiting, SOB, abdominal pain, diarrhea, constipation, dysuria, hematuria, polyuria, or any other complaint at this time.                Prior to Admission Medications   Prescriptions Last Dose Informant Patient Reported? Taking?   Cyanocobalamin 5000 MCG SUBL  Self Yes No   Sig: Place under the tongue   Fe Bisgly-Vit C-Vit B12-FA 28-60-0.008-0.4 MG CAPS  Self Yes No   Sig: Take by mouth   Ginkgo Biloba (GINKOBA PO)  Self Yes No   Sig: Take by mouth   Magnesium Gluconate 550 MG TABS  Self Yes No   Sig: Take 400 mg by mouth 2 (two) times a day   Restasis 0.05 % ophthalmic emulsion  Self Yes No   VITAMIN D PO  Self Yes No   Sig: Take by mouth   amoxicillin (AMOXIL) 500 mg capsule   No No   Sig: Take 1 capsule (500 mg total) by mouth every 12 (twelve) hours for 10 days   dicyclomine (BENTYL) 10 mg capsule   No No   Sig: Take 1 capsule (10 mg total) by mouth 3 (three) times a day as needed (abdominal cramping)   fluticasone  (FLONASE) 50 mcg/act nasal spray   No No   Si spray into each nostril daily      Facility-Administered Medications: None       Past Medical History:   Diagnosis Date    Abdominal pain     with nausea    ADHD     Anemia     Anxiety     Chlamydia     Diarrhea     GERD (gastroesophageal reflux disease)     Migraine     Mononucleosis     Ovarian cyst     PONV (postoperative nausea and vomiting)        Past Surgical History:   Procedure Laterality Date    SINUS SURGERY      WISDOM TOOTH EXTRACTION         Family History   Problem Relation Age of Onset    Hypertension Mother     Hypertension Father     No Known Problems Brother     Hypertension Maternal Grandmother     Cancer Maternal Grandfather         bladder    Lupus Paternal Grandmother     Diabetes Paternal Grandmother     Alzheimer's disease Paternal Grandfather      I have reviewed and agree with the history as documented.    E-Cigarette/Vaping    E-Cigarette Use Never User      E-Cigarette/Vaping Substances    Nicotine No     THC No     CBD No     Flavoring No     Other No     Unknown No      Social History     Tobacco Use    Smoking status: Never    Smokeless tobacco: Never   Vaping Use    Vaping status: Never Used   Substance Use Topics    Alcohol use: Yes     Alcohol/week: 1.0 standard drink of alcohol     Types: 1 Glasses of wine per week     Comment: socially weekly    Drug use: Never        Review of Systems   Constitutional:  Positive for fever. Negative for chills.   HENT:  Positive for sore throat. Negative for ear pain.    Respiratory:  Negative for cough and shortness of breath.    Cardiovascular:  Negative for chest pain and palpitations.   Gastrointestinal:  Negative for abdominal pain, constipation, diarrhea, nausea and vomiting.   Genitourinary:  Negative for dysuria and hematuria.   Musculoskeletal:  Positive for arthralgias and back pain.   Skin:  Negative for color change and rash.   Neurological:  Negative for seizures and syncope.   All  other systems reviewed and are negative.      Physical Exam  ED Triage Vitals   Temperature Pulse Respirations Blood Pressure SpO2   03/27/24 0728 03/27/24 0728 03/27/24 0728 03/27/24 0730 03/27/24 0728   97.9 °F (36.6 °C) 86 18 135/88 100 %      Temp Source Heart Rate Source Patient Position - Orthostatic VS BP Location FiO2 (%)   03/27/24 0728 03/27/24 0728 03/27/24 0728 03/27/24 0728 --   Oral Monitor Sitting Right arm       Pain Score       03/27/24 0728       7             Orthostatic Vital Signs  Vitals:    03/27/24 1008 03/27/24 1100 03/27/24 1130 03/27/24 1200   BP: 140/81 125/72 112/61 149/62   Pulse: 91 100 77 77   Patient Position - Orthostatic VS:           Physical Exam  Vitals and nursing note reviewed.   Constitutional:       General: She is not in acute distress.     Appearance: She is well-developed.   HENT:      Head: Normocephalic and atraumatic.      Comments: Mild tenderness of maxillary sinus     Right Ear: Tympanic membrane and ear canal normal.      Left Ear: Tympanic membrane and ear canal normal.      Mouth/Throat:      Tonsils: Tonsillar exudate present. 3+ on the right. 3+ on the left.   Eyes:      Conjunctiva/sclera: Conjunctivae normal.   Cardiovascular:      Rate and Rhythm: Normal rate and regular rhythm.      Pulses: Normal pulses.      Heart sounds: Normal heart sounds. No murmur heard.     No friction rub. No gallop.   Pulmonary:      Effort: Pulmonary effort is normal. No respiratory distress.      Breath sounds: Normal breath sounds. No stridor. No wheezing or rales.   Musculoskeletal:         General: No swelling.      Cervical back: Neck supple. Pain with movement and spinous process tenderness present. No muscular tenderness.   Lymphadenopathy:      Cervical: Cervical adenopathy present.   Skin:     General: Skin is warm and dry.      Capillary Refill: Capillary refill takes less than 2 seconds.   Neurological:      Mental Status: She is alert.   Psychiatric:         Mood and  Affect: Mood normal.         ED Medications  Medications   dexamethasone (PF) (DECADRON) injection 10 mg (10 mg Intravenous Given 3/27/24 0823)   ketorolac (TORADOL) injection 15 mg (15 mg Intravenous Given 3/27/24 0822)   iohexol (OMNIPAQUE) 350 MG/ML injection (MULTI-DOSE) 80 mL (80 mL Intravenous Given 3/27/24 0931)   sodium chloride 0.9 % bolus 1,000 mL (0 mL Intravenous Stopped 3/27/24 1224)   ketorolac (TORADOL) injection 15 mg (15 mg Intravenous Given 3/27/24 1115)   amoxicillin-clavulanate (AUGMENTIN) 875-125 mg per tablet 1 tablet (1 tablet Oral Given 3/27/24 1111)       Diagnostic Studies  Results Reviewed       Procedure Component Value Units Date/Time    FLU/RSV/COVID - if FLU/RSV clinically relevant [[id number]]  (Normal) Collected: 03/27/24 0829    Lab Status: Final result Specimen: Nares from Nose Updated: 03/27/24 0927     SARS-CoV-2 Negative     INFLUENZA A PCR Negative     INFLUENZA B PCR Negative     RSV PCR Negative    Narrative:      FOR PEDIATRIC PATIENTS - copy/paste COVID Guidelines URL to browser: [email or web address]    SARS-CoV-2 assay is a Nucleic Acid Amplification assay intended for the  qualitative detection of nucleic acid from SARS-CoV-2 in nasopharyngeal  swabs. Results are for the presumptive identification of SARS-CoV-2 RNA.    Positive results are indicative of infection with SARS-CoV-2, the virus  causing COVID-19, but do not rule out bacterial infection or co-infection  with other viruses. Laboratories within the [place] and its  territories are required to report all positive results to the appropriate  public health authorities. Negative results do not preclude SARS-CoV-2  infection and should not be used as the sole basis for treatment or other  patient management decisions. Negative results must be combined with  clinical observations, patient history, and epidemiological information.  This test has not been FDA  cleared or approved.    This test has been authorized by FDA under an Emergency Use Authorization  (EUA). This test is only authorized for the duration of time the  declaration that circumstances exist justifying the authorization of the  emergency use of an in vitro diagnostic tests for detection of SARS-CoV-2  virus and/or diagnosis of COVID-19 infection under section 564(b)(1) of  the Act, 21 U.S.C. 360bbb-3(b)(1), unless the authorization is terminated  or revoked sooner. The test has been validated but independent review by FDA  and CLIA is pending.    Test performed using [employer] Gene[employer]pert: This RT-PCR assay targets N2,  a region unique to SARS-CoV-2. A conserved region in the E-gene was chosen  for pan-Sarbecovirus detection which includes SARS-CoV-2.    According to CMS-2020-01-R, this platform meets the definition of high-throughput technology.    Throat culture [[id number]] Collected: 03/27/24 0903    Lab Status: In process Specimen: Throat Updated: 03/27/24 0916    Basic metabolic panel [[id number]] Collected: 03/27/24 0827    Lab Status: Final result Specimen: Blood from Arm, Right Updated: 03/27/24 0906     Sodium 138 mmol/L      Potassium 3.5 mmol/L      Chloride 104 mmol/L      CO2 27 mmol/L      ANION GAP 7 mmol/L      BUN 13 mg/dL      Creatinine 0.72 mg/dL      Glucose 84 mg/dL      Calcium 8.9 mg/dL      eGFR 115 ml/min/1.73sq m     Narrative:      National Kidney Disease Foundation guidelines for Chronic Kidney Disease (CKD):     Stage 1 with normal or high GFR (GFR > 90 mL/min/1.73 square meters)    Stage 2 Mild CKD (GFR = 60-89 mL/min/1.73 square meters)    Stage 3A Moderate CKD (GFR = 45-59 mL/min/1.73 square meters)    Stage 3B Moderate CKD (GFR = 30-44 mL/min/1.73 square meters)    Stage 4 Severe CKD (GFR = 15-29 mL/min/1.73 square meters)    Stage 5 End Stage CKD (GFR <15 mL/min/1.73 square meters)  Note: GFR calculation is accurate only with a steady state creatinine    POCT pregnancy, urine  [[id number]]  (Normal) Resulted: 03/27/24 0903    Lab Status: Final result Updated: 03/27/24 0905     EXT Preg Test, Ur Negative     Control Valid    CBC and differential [[id number]]  (Abnormal) Collected: 03/27/24 0827    Lab Status: Final result Specimen: Blood from Arm, Right Updated: 03/27/24 0842     WBC 8.80 Thousand/uL      RBC 4.40 Million/uL      Hemoglobin 13.0 g/dL      Hematocrit 38.6 %      MCV 88 fL      MCH 29.5 pg      MCHC 33.7 g/dL      RDW 12.3 %      MPV 9.6 fL      Platelets 237 Thousands/uL      nRBC 0 /100 WBCs      Neutrophils Relative 84 %      Immature Grans % 0 %      Lymphocytes Relative 8 %      Monocytes Relative 8 %      Eosinophils Relative 0 %      Basophils Relative 0 %      Neutrophils Absolute 7.27 Thousands/µL      Absolute Immature Grans 0.03 Thousand/uL      Absolute Lymphocytes 0.71 Thousands/µL      Absolute Monocytes 0.73 Thousand/µL      Eosinophils Absolute 0.03 Thousand/µL      Basophils Absolute 0.03 Thousands/µL                    CT soft tissue neck   Final Result by Kirit Rush DO (03/27 0959)      No soft tissue mass or pathologic adenopathy. No inflammatory changes identified within the oropharynx/hypopharynx.      Left maxillary sinus disease with mucosal thickening and retention cyst formation.            Workstation performed: [id number]               Procedures  Procedures      ED Course                                       Medical Decision Making  Patient presents with:  Pt reports strep since last week, on amoxicillin day 8 that improved the first 24 hours,  states worsening last night, back/neck pain, neck stiffness, fevers last night, tylenol last at 0000. Patient is currently being worked up for possible hashimoto's thyroiditis. Patient gets yearly strep infections for the last 15 years that usually resolve with amoxicillin.      Patient seen and examined noted to have tender cervical adenopathy, enlarged tonsils with mild exudate on right  tonsil, cervical spine tenderness.      Differential diagnosis includes but is not limited to peritonsillar abscess, retropharyngeal abscess, strep pharyngitis , upper viral respiratory syndrome, viral syndrome, rhino sinusitis.      Patient's labs notable for: unremarkable Flu/RSV/COVID, urine preg, BMP, CBC    Imaging revealed:   CT soft tissue neck  IMPRESSION:  No soft tissue mass or pathologic adenopathy. No inflammatory changes identified within the oropharynx/hypopharynx.  Left maxillary sinus disease with mucosal thickening and retention cyst formation.     Patient appears well, is nontoxic appearing, Maryse Reid expresses understanding and agrees with plan of care at this time.  In light of this patient would benefit from outpatient management.    Patient was rx'd as below     Amount and/or Complexity of Data Reviewed  Labs: ordered.  Radiology: ordered.    Risk  Prescription drug management.          Disposition  Final diagnoses:   Maxillary sinusitis, acute   Pharyngitis     Time reflects when diagnosis was documented in both MDM as applicable and the Disposition within this note       Time User Action Codes Description Comment    3/27/2024 10:47 AM Jesús Ochoa Add [J01.00] Maxillary sinusitis, acute     3/27/2024 10:47 AM Jesús Ochoa Add [J02.9] Pharyngitis           ED Disposition       ED Disposition   Discharge    Condition   Stable    Date/Time   Wed Mar 27, 2024 12:06 PM    Comment   Maryse Reid discharge to home/self care.                   Follow-up Information    None         Discharge Medication List as of 3/27/2024 12:08 PM        START taking these medications    Details   amoxicillin-clavulanate (AUGMENTIN) 875-125 mg per tablet Take 1 tablet by mouth every 12 (twelve) hours for 7 days, Starting Wed 3/27/2024, Until Wed 4/3/2024, Normal           CONTINUE these medications which have NOT CHANGED    Details   amoxicillin (AMOXIL) 500 mg capsule Take 1 capsule (500 mg total) by mouth  every 12 (twelve) hours for 10 days, Starting Tue 3/19/2024, Until Fri 3/29/2024, Normal      Cyanocobalamin 5000 MCG SUBL Place under the tongue, Historical Med      dicyclomine (BENTYL) 10 mg capsule Take 1 capsule (10 mg total) by mouth 3 (three) times a day as needed (abdominal cramping), Starting Thu 11/9/2023, Normal      Fe Bisgly-Vit C-Vit B12-FA 28-60-0.008-0.4 MG CAPS Take by mouth, Historical Med      fluticasone (FLONASE) 50 mcg/act nasal spray 1 spray into each nostril daily, Starting Tue 3/19/2024, Normal      Ginkgo Biloba (GINKOBA PO) Take by mouth, Historical Med      Magnesium Gluconate 550 MG TABS Take 400 mg by mouth 2 (two) times a day, Historical Med      Restasis 0.05 % ophthalmic emulsion Historical Med      VITAMIN D PO Take by mouth, Historical Med           No discharge procedures on file.    PDMP Review       None             ED Provider  Attending physically available and evaluated Maryse R Hazel ROSENBERG managed the patient along with the ED Attending.    Electronically Signed by           Jesús Ochoa MD  03/27/24 0953

## 2024-03-28 ENCOUNTER — OFFICE VISIT (OUTPATIENT)
Dept: FAMILY MEDICINE CLINIC | Facility: MEDICAL CENTER | Age: 29
End: 2024-03-28
Payer: COMMERCIAL

## 2024-03-28 ENCOUNTER — NURSE TRIAGE (OUTPATIENT)
Dept: OTHER | Facility: OTHER | Age: 29
End: 2024-03-28

## 2024-03-28 VITALS
OXYGEN SATURATION: 99 % | RESPIRATION RATE: 18 BRPM | BODY MASS INDEX: 22.37 KG/M2 | WEIGHT: 134.4 LBS | SYSTOLIC BLOOD PRESSURE: 120 MMHG | DIASTOLIC BLOOD PRESSURE: 70 MMHG | TEMPERATURE: 97.1 F | HEART RATE: 60 BPM

## 2024-03-28 DIAGNOSIS — J01.00 ACUTE NON-RECURRENT MAXILLARY SINUSITIS: Primary | ICD-10-CM

## 2024-03-28 DIAGNOSIS — B37.9 YEAST INFECTION: ICD-10-CM

## 2024-03-28 PROCEDURE — 99213 OFFICE O/P EST LOW 20 MIN: CPT

## 2024-03-28 RX ORDER — FLUCONAZOLE 150 MG/1
150 TABLET ORAL ONCE
Qty: 1 TABLET | Refills: 0 | Status: SHIPPED | OUTPATIENT
Start: 2024-03-28 | End: 2024-03-28

## 2024-03-28 RX ORDER — CLINDAMYCIN HYDROCHLORIDE 300 MG/1
300 CAPSULE ORAL 3 TIMES DAILY
Qty: 21 CAPSULE | Refills: 0 | Status: SHIPPED | OUTPATIENT
Start: 2024-03-28 | End: 2024-04-04

## 2024-03-28 NOTE — TELEPHONE ENCOUNTER
"Reason for Disposition  • Face or lip swelling    Answer Assessment - Initial Assessment Questions  1. APPEARANCE of RASH: \"Describe the rash.\" (e.g., spots, blisters, raised areas, skin peeling, scaly)      Red puffy face.   3. LOCATION: \"Where is the rash located?\"      Face   4. COLOR: \"What color is the rash?\" (Note: It is difficult to assess rash color in people with darker-colored skin. When this situation occurs, simply ask the caller to describe what they see.)      Red face   5. ONSET: \"When did the rash begin?\"      This morning   6. FEVER: \"Do you have a fever?\" If Yes, ask: \"What is your temperature, how was it measured, and when did it start?\"      Unable to assess.   7. ITCHING: \"Does the rash itch?\" If Yes, ask: \"How bad is the itch?\" (Scale 1-10; or mild, moderate, severe)      No   8. CAUSE: \"What do you think is causing the rash?\"      Patient developed a reaction after the third dose of Augmentin.   9. NEW MEDICATION: \"What new medication are you taking?\" (e.g., name of antibiotic) \"When did you start taking this medication?\".      Augmentin   10. OTHER SYMPTOMS: \"Do you have any other symptoms?\" (e.g., sore throat, fever, joint pain)        No   11. PREGNANCY: \"Is there any chance you are pregnant?\" \"When was your last menstrual period?\"        No, LMP a months ago.    Protocols used: Rash - Widespread On Drugs-ADULT-[place]    "

## 2024-03-28 NOTE — PROGRESS NOTES
"Assessment/Plan:       1. Acute non-recurrent maxillary sinusitis  Discontinue Augmentin due to allergic reaction.  Start clindamycin, take as directed until course is complete.  Start Flonase, use once daily.  Advised about use of probiotic during course of antibiotics.  - clindamycin (CLEOCIN) 300 MG capsule; Take 1 capsule (300 mg total) by mouth 3 (three) times a day for 7 days  Dispense: 21 capsule; Refill: 0    2. Yeast infection  One-time dose of Diflucan sent to pharmacy, also advised patient about use of probiotics during course of antibiotics.  - fluconazole (DIFLUCAN) 150 mg tablet; Take 1 tablet (150 mg total) by mouth once for 1 dose  Dispense: 1 tablet; Refill: 0      Subjective:      Patient ID: Maryse Reid is a 28 y.o. female.    28 year old female presents with complaints of facial swelling that started this morning after taking 3rd dose of Augmentin which was prescribed yesterday by the ER for acute sinusitis. Denies itchiness, hives, oral swelling. She took Benadryl and pepcid upon noticing the swelling this morning and when leaving her house to come into the office her lips and throat started getting tingly and itchy but that has since resolved. She is currently asymptomatic. She does report being on course of Amoxil 1 week ago for strep pharyngitis, tolerated Amoxil without difficulty or reaction. She also reports \"I think I have a yeast infection from the antibiotics, I always get them with antibiotics.\" She took a dose of Diflucan but is still having symptoms.           The following portions of the patient's history were reviewed and updated as appropriate: allergies, current medications, past medical history, past social history, past surgical history, and problem list.    Review of Systems   Constitutional: Negative.    HENT:  Positive for facial swelling (resolved).    Eyes: Negative.    Respiratory: Negative.     Cardiovascular: Negative.    Gastrointestinal: Negative.    Endocrine: " Negative.    Genitourinary:  Positive for vaginal discharge (and itching).   Musculoskeletal: Negative.    Skin: Negative.    Allergic/Immunologic: Negative.    Neurological: Negative.    Hematological: Negative.    Psychiatric/Behavioral: Negative.           Objective:      /70 (BP Location: Left arm, Patient Position: Sitting, Cuff Size: Standard)   Pulse 60   Temp (!) 97.1 °F (36.2 °C) (Temporal)   Resp 18   Wt 61 kg (134 lb 6.4 oz)   SpO2 99%   BMI 22.37 kg/m²          Physical Exam  Vitals and nursing note reviewed.   Constitutional:       General: She is not in acute distress.     Appearance: Normal appearance. She is ill-appearing.   HENT:      Head: Normocephalic and atraumatic.      Right Ear: Tympanic membrane and ear canal normal.      Left Ear: Tympanic membrane and ear canal normal.      Nose: Congestion present.      Mouth/Throat:      Mouth: Mucous membranes are moist.      Pharynx: Oropharynx is clear. No oropharyngeal exudate or posterior oropharyngeal erythema.   Eyes:      Conjunctiva/sclera: Conjunctivae normal.   Cardiovascular:      Rate and Rhythm: Normal rate and regular rhythm.      Pulses: Normal pulses.      Heart sounds: Normal heart sounds.   Pulmonary:      Effort: Pulmonary effort is normal.      Breath sounds: Normal breath sounds.   Musculoskeletal:      Cervical back: Normal range of motion and neck supple. No rigidity or tenderness.   Lymphadenopathy:      Cervical: No cervical adenopathy.   Skin:     General: Skin is warm and dry.   Neurological:      General: No focal deficit present.      Mental Status: She is alert and oriented to person, place, and time. Mental status is at baseline.   Psychiatric:         Mood and Affect: Mood normal.         Behavior: Behavior normal.         Thought Content: Thought content normal.                    ROSA M Villarreal

## 2024-03-29 LAB — BACTERIA THROAT CULT: NORMAL

## 2024-04-01 ENCOUNTER — NURSE TRIAGE (OUTPATIENT)
Age: 29
End: 2024-04-01

## 2024-04-01 NOTE — TELEPHONE ENCOUNTER
"Reason for Disposition   Continuous (nonstop) coughing interferes with work or school and no improvement using cough treatment per Care Advice    Answer Assessment - Initial Assessment Questions  1. ONSET: \"When did the cough begin?\"       Two weeks ago   2. SEVERITY: \"How bad is the cough today?\"       MODERATE   3. SPUTUM: \"Describe the color of your sputum\" (none, dry cough; clear, white, yellow, green)      DRY COUGH   4. HEMOPTYSIS: \"Are you coughing up any blood?\" If so ask: \"How much?\" (flecks, streaks, tablespoons, etc.)      NO   5. DIFFICULTY BREATHING: \"Are you having difficulty breathing?\" If Yes, ask: \"How bad is it?\" (e.g., mild, moderate, severe)     - MILD: No SOB at rest, mild SOB with walking, speaks normally in sentences, can lay down, no retractions, pulse < 100.     - MODERATE: SOB at rest, SOB with minimal exertion and prefers to sit, cannot lie down flat, speaks in phrases, mild retractions, audible wheezing, pulse 100-120.     - SEVERE: Very SOB at rest, speaks in single words, struggling to breathe, sitting hunched forward, retractions, pulse > 120       SOB ON EXERTION   6. FEV ER: \"Do you have a fever?\" If Yes, ask: \"What is your temperature, how was it measured, and when did it start?\"      AFEBRILE   7. CARDIAC HISTORY: \"Do you have any history of heart disease?\" (e.g., heart attack, congestive heart failure)       NO   8. LUNG HISTORY: \"Do you have any history of lung disease?\"  (e.g., pulmonary embolus, asthma, emphysema)      NO   9. PE RISK FACTORS: \"Do you have a history of blood clots?\" (or: recent major surgery, recent prolonged travel, bedridden)      NO   10. OTHER SYMPTOMS: \"Do you have any other symptoms?\" (e.g., runny nose, wheezing, chest pain)        Sob , headache ,patient denies chest pain ,wheezing   11. PREGNANCY: \"Is there any chance you are pregnant?\" \"When was your last menstrual period?\"        No   12. TRAVEL: \"Have you traveled out of the country in the last " "month?\" (e.g., travel history, exposures)       No   Patient currently on prednisone day #2 and     clindamycin first dose on 3/28  No relieve  in symptoms  Office visit scheduled for tomorrow    Protocols used: Cough-ADULT-[place]    "

## 2024-04-02 ENCOUNTER — TELEPHONE (OUTPATIENT)
Age: 29
End: 2024-04-02

## 2024-04-02 ENCOUNTER — OFFICE VISIT (OUTPATIENT)
Dept: FAMILY MEDICINE CLINIC | Facility: MEDICAL CENTER | Age: 29
End: 2024-04-02
Payer: COMMERCIAL

## 2024-04-02 ENCOUNTER — APPOINTMENT (OUTPATIENT)
Dept: LAB | Facility: MEDICAL CENTER | Age: 29
End: 2024-04-02
Payer: COMMERCIAL

## 2024-04-02 VITALS
RESPIRATION RATE: 16 BRPM | WEIGHT: 133 LBS | HEIGHT: 65 IN | HEART RATE: 62 BPM | SYSTOLIC BLOOD PRESSURE: 106 MMHG | TEMPERATURE: 97.6 F | OXYGEN SATURATION: 98 % | DIASTOLIC BLOOD PRESSURE: 72 MMHG | BODY MASS INDEX: 22.16 KG/M2

## 2024-04-02 DIAGNOSIS — J32.9 RECURRENT SINUS INFECTIONS: ICD-10-CM

## 2024-04-02 DIAGNOSIS — R53.83 FATIGUE, UNSPECIFIED TYPE: ICD-10-CM

## 2024-04-02 DIAGNOSIS — R53.83 FATIGUE, UNSPECIFIED TYPE: Primary | ICD-10-CM

## 2024-04-02 PROCEDURE — 99214 OFFICE O/P EST MOD 30 MIN: CPT

## 2024-04-02 PROCEDURE — 83520 IMMUNOASSAY QUANT NOS NONAB: CPT

## 2024-04-02 PROCEDURE — 36415 COLL VENOUS BLD VENIPUNCTURE: CPT

## 2024-04-02 PROCEDURE — 86037 ANCA TITER EACH ANTIBODY: CPT

## 2024-04-02 RX ORDER — PREDNISONE 20 MG/1
20 TABLET ORAL DAILY
COMMUNITY
Start: 2024-03-30 | End: 2024-04-04

## 2024-04-02 NOTE — PROGRESS NOTES
Assessment/Plan:    Check lab.   Advised to follow up with ENT and Rheumatology.  Continue prednisone, Clindamycin and Flonase as directed until course is complete.      1. Fatigue, unspecified type  Check additional lab due to ongoing fatigue and recurrent sinus infections.   Follow up with Rheumatology.   - Anti-neutrophilic cytoplasmic antibody; Future    2. Recurrent sinus infections  Check CT sinuses due to recurrent sinus infections with history of sinus surgery.   [facility] last saw Dr. Alvarez.  - CT sinus wo contrast; Future      Subjective:      Patient ID: Maryse Reid is a 29 y.o. female.    29-year-old female presents with complaints of ongoing  sore throat, fatigue, headaches, hot flashes, body aches, neck pain, sob, lightheadedness since last week.  She was initially seen in the emergency department on 3/27, treated for sinusitis with Augmentin.  She then had an allergic reaction to the medication, discontinued it and was started on clindamycin and Flonase last week here in office.  She was also seen at  3 days ago with similar symptoms. Mono testing returned negative. She was given 5 day course of Prednisone which she is still taking along with Clindamycin and Flonase that was prescribed last week. She reports sore throat is improving but she is still experiencing all of the other symptoms as noted above, most bothersome is fatigue which is chronic. She had an autoimmune workup last year and is also following with Rheumatology.   She reports history of recurrent sinus infections in the past, she did undergo sinus surgery in 2015.  She is not currently following with ENT.          The following portions of the patient's history were reviewed and updated as appropriate: allergies, current medications, past family history, past medical history, past surgical history, and problem list.    Review of Systems   Constitutional:  Positive for fatigue.   HENT:  Positive  "for sore throat.    Eyes: Negative.    Respiratory: Negative.     Cardiovascular: Negative.    Gastrointestinal: Negative.    Endocrine: Negative.    Genitourinary: Negative.    Musculoskeletal:  Positive for myalgias and neck pain.   Skin: Negative.    Allergic/Immunologic: Negative.    Neurological:  Positive for headaches.   Hematological: Negative.    Psychiatric/Behavioral: Negative.           Objective:      /72 (BP Location: Right arm, Patient Position: Sitting)   Pulse 62   Temp 97.6 °F (36.4 °C) (Temporal)   Resp 16   Ht 5' 5\" (1.651 m)   Wt 60.3 kg (133 lb)   LMP 02/24/2024   SpO2 98%   BMI 22.13 kg/m²          Physical Exam  Vitals and nursing note reviewed.   Constitutional:       General: She is not in acute distress.     Appearance: Normal appearance. She is not ill-appearing.   HENT:      Head: Normocephalic and atraumatic.      Right Ear: Tympanic membrane, ear canal and external ear normal.      Left Ear: Tympanic membrane, ear canal and external ear normal.      Nose: Nose normal.      Mouth/Throat:      Mouth: Mucous membranes are moist.      Pharynx: Oropharynx is clear. No oropharyngeal exudate.   Eyes:      Conjunctiva/sclera: Conjunctivae normal.   Cardiovascular:      Rate and Rhythm: Normal rate and regular rhythm.      Pulses: Normal pulses.      Heart sounds: Normal heart sounds.   Pulmonary:      Effort: Pulmonary effort is normal.      Breath sounds: Normal breath sounds.   Musculoskeletal:      Cervical back: Normal range of motion and neck supple. Tenderness (posterior paraspinal/muscular) present. No rigidity.   Lymphadenopathy:      Cervical: No cervical adenopathy.   Skin:     General: Skin is warm and dry.   Neurological:      General: No focal deficit present.      Mental Status: She is alert and oriented to person, place, and time. Mental status is at baseline.      Comments: Negative Kernig and Brudzinski signs.    Psychiatric:         Mood and Affect: Mood normal. "         Behavior: Behavior normal.         Thought Content: Thought content normal.                    ROSA M Villarreal

## 2024-04-03 ENCOUNTER — APPOINTMENT (OUTPATIENT)
Dept: LAB | Facility: CLINIC | Age: 29
End: 2024-04-03
Payer: COMMERCIAL

## 2024-04-03 ENCOUNTER — OFFICE VISIT (OUTPATIENT)
Dept: RHEUMATOLOGY | Facility: CLINIC | Age: 29
End: 2024-04-03
Payer: COMMERCIAL

## 2024-04-03 VITALS
WEIGHT: 134 LBS | DIASTOLIC BLOOD PRESSURE: 68 MMHG | BODY MASS INDEX: 22.33 KG/M2 | SYSTOLIC BLOOD PRESSURE: 110 MMHG | HEIGHT: 65 IN

## 2024-04-03 DIAGNOSIS — R53.83 OTHER FATIGUE: ICD-10-CM

## 2024-04-03 DIAGNOSIS — R61 NIGHT SWEATS: Primary | ICD-10-CM

## 2024-04-03 DIAGNOSIS — M25.50 ARTHRALGIA, UNSPECIFIED JOINT: ICD-10-CM

## 2024-04-03 PROCEDURE — 36415 COLL VENOUS BLD VENIPUNCTURE: CPT | Performed by: PHYSICIAN ASSISTANT

## 2024-04-03 PROCEDURE — 86618 LYME DISEASE ANTIBODY: CPT | Performed by: PHYSICIAN ASSISTANT

## 2024-04-03 PROCEDURE — 99213 OFFICE O/P EST LOW 20 MIN: CPT | Performed by: PHYSICIAN ASSISTANT

## 2024-04-03 NOTE — PROGRESS NOTES
Assessment and Plan:   Ms. Reid is a 29-year-old female who presents for rheumatology follow-up of +TEO 1:80 and +centromere ab (1.2).      The patient presented approximately 1.5 months ago with complaint of fatigue, polyarthralgia with stiffness and swelling, intermittent salivary gland swelling, dry eye and dry mouth, Raynaud's phenomenon, diarrhea, GERD, and family history of SLE in paternal grandmother.  Complete serologic workup revealed +TEO 1:80, +centromere ab (1.2), +thyroglobulin ab (2), and +Iona (70, suggestive of Crohn's disease, despite normal colonoscopy 11/16/23).  Lip biopsy was unremarkable for Sjogren's 10/31/23.    At this time, her workup does not reveal a specific rheumatological diagnosis at this time and therefore we will take a monitoring approach.  The low titer TEO could potentially be due to the positive thyroid antibody and the low titer centromere antibody is nonspecific at this time.      Since January, she has been experiencing other symptoms including fatigue, decreased appetite, strep pharyngitis, sinusitis, night sweats, and bodyaches despite multiple rounds of antibiotics including amoxicillin, Augmentin, and clindamycin.  She was also prescribed prednisone which has not seemed to help with her aches or other symptoms.  We discussed that the symptoms do not seem rheumatic in nature and to support this, she is not improving on prednisone.  It is possible that this could be viral in nature (lost taste and smell in January and had URI symptoms, therefore could have been COVID or flu but was tested too late) as workup has been so far unremarkable.  Will add Lyme.  ANCA panel was also ordered by the primary team likely in light of recurrent sinusitis, will await this as well.    Follow-up in July as scheduled for routine follow-up.    Plan:  Diagnoses and all orders for this visit:    Night sweats  -     Lyme Total AB W Reflex to IGM/IGG    Other fatigue  -     Lyme Total AB W  Reflex to IGM/IGG    Arthralgia, unspecified joint  -     Lyme Total AB W Reflex to IGM/IGG        I have personally reviewed prior notes, recent laboratory results, and pertinent films in PACS.     Activities as tolerated.   Exercise: try to maintain a low impact exercise regimen as much as possible. Walk for 30 minutes a day for at least 3 days a week.   Continue other medications as prescribed by PCP and other specialists.       RTC in 3 months    Rheumatic Disease Summary:  +TEO 1:80, +Centromere ab (1.2)  -Initial visit 10/12/23:  fatigue, polyarthralgia with stiffness and swelling, intermittent salivary gland swelling, severe dry eye and dry mouth, Raynaud's phenomenon, diarrhea, GERD, and has a family history of SLE in paternal grandmother.  Recent labs as ordered by the primary team revealed TEO 1:80 in a speckled pattern and otherwise unremarkable in terms of dsDNA, SSA, SSB, Joseph, RNP, RF, CCP.  Arrange for lip bx to r/o SS and refer to GI for c/o diarrhea and GERD.  -Labs 10/2023: +TEO 1:80, +centromere ab (1.2), +thyroglobulin ab (2), and +Iona (70, suggestive of Crohn's disease). Neg C3, C4, ESR, Hep panel, B2 glyco, HLA-B27, hisotne, cardiolipin, A1 antitrypsin, cryo, SCL-70, mitochondrial ab, U1RNP, RNA poly III, Th/To  -Salivary gland bx 10/31/23: neg for SS  -Colonoscopy 11/16/23: normal  -Visit 11/27/23: W/u does not reveal a specific rheumatological diagnosis at this time and therefore we will take a monitoring approach.  The low titer TEO could potentially be due to the positive thyroid antibody and the low titer centromere antibody is nonspecific at this time.  -Visit 4/3/24: c/o fatigue, decreased appetite, strep pharyngitis, sinusitis, night sweats, and bodyaches since January despite multiple rounds of antibiotics including amoxicillin, Augmentin, and clindamycin.  She was also prescribed prednisone which has not seemed to help with her aches or other symptoms.  We discussed that it is  possible that this could be viral in nature (lost taste and smell in January and had URI symptoms, therefore could have been COVID or flu but was tested too late) as workup has been so far unremarkable.  Will add Lyme.  ANCA panel was also ordered by the primary team likely in light of recurrent sinusitis, will await this as well.  2.  Other comorbidities: iron deficiency anemia, dysmenorrhea        HPI  Ms. Reid is a 29-year-old female who presents for rheumatology follow-up of +TEO 1:80 and +centromere ab (1.2).     Has been experiencing multiple symptoms since January including fatigue, decreased appetite, strep pharyngitis, sinusitis, night sweats, and bodyaches.  Has not felt better with multiple antibiotics including amoxicillin, Augmentin, and clindamycin.  Notes that she lost her taste and smell in January and had upper respiratory infections.  She states she was not tested for flu or COVID for quite a few weeks after this.  She states that she is on prednisone 20 mg daily as well (about to finish the course) and has not noticed a difference in her body aches or other symptoms overall.  She states that today compared to last few months she does feel a little bit better.    The following portions of the patient's history were reviewed and updated as appropriate: allergies, current medications, past family history, past medical history, past social history, past surgical history and problem list.      Review of Systems  Constitutional: +night sweats, fatigue.  ENT/Mouth: Negative for hearing changes, ear pain, nasal congestion, sinus pain, hoarseness, sore throat, rhinorrhea, swallowing difficulty.   Eyes: Negative for pain, redness, discharge, vision changes.   Cardiovascular: Negative for chest pain, SOB, palpitations.   Respiratory: Negative for cough, sputum, wheezing, dyspnea.   Gastrointestinal: + Decreased appetite  Genitourinary: Negative for dysuria, urinary frequency, hematuria.   Musculoskeletal:  As per HPI.  Skin: Negative for skin rash, color changes.   Neuro: Negative for weakness, numbness, tingling, loss of consciousness.   Psych: Negative for anxiety, depression.   Heme/Lymph: Negative for easy bruising, bleeding, lymphadenopathy.        Past Medical History:   Diagnosis Date    Abdominal pain     with nausea    ADHD     Anemia     Anxiety     Chlamydia     Diarrhea     GERD (gastroesophageal reflux disease)     Migraine     Mononucleosis     Ovarian cyst     PONV (postoperative nausea and vomiting)        Past Surgical History:   Procedure Laterality Date    SINUS SURGERY      WISDOM TOOTH EXTRACTION         Social History     Socioeconomic History    Marital status: Single     Spouse name: Not on file    Number of children: Not on file    Years of education: Not on file    Highest education level: Not on file   Occupational History    Not on file   Tobacco Use    Smoking status: Never    Smokeless tobacco: Never   Vaping Use    Vaping status: Never Used   Substance and Sexual Activity    Alcohol use: Yes     Alcohol/week: 1.0 standard drink of alcohol     Types: 1 Glasses of wine per week     Comment: socially weekly    Drug use: Never    Sexual activity: Yes     Partners: Male     Birth control/protection: I.U.D.     Comment: history STD-chlamydia   Other Topics Concern    Not on file   Social History Narrative    Lives in house with BF            Currently in college        Not working while in school     Social Determinants of Health     Financial Resource Strain: Not on file   Food Insecurity: Not on file   Transportation Needs: Not on file   Physical Activity: Not on file   Stress: Not on file   Social Connections: Not on file   Intimate Partner Violence: Not on file   Housing Stability: Not on file       Family History   Problem Relation Age of Onset    Hypertension Mother     Hypertension Father     No Known Problems Brother     Hypertension Maternal Grandmother     Cancer Maternal Grandfather  "        bladder    Lupus Paternal Grandmother     Diabetes Paternal Grandmother     Alzheimer's disease Paternal Grandfather        Allergies   Allergen Reactions    Augmentin [Amoxicillin-Pot Clavulanate] Facial Swelling         Current Outpatient Medications:     clindamycin (CLEOCIN) 300 MG capsule, Take 1 capsule (300 mg total) by mouth 3 (three) times a day for 7 days, Disp: 21 capsule, Rfl: 0    dicyclomine (BENTYL) 10 mg capsule, Take 1 capsule (10 mg total) by mouth 3 (three) times a day as needed (abdominal cramping), Disp: 40 capsule, Rfl: 5    fluticasone (FLONASE) 50 mcg/act nasal spray, 1 spray into each nostril daily, Disp: 9.9 mL, Rfl: 0    Magnesium Gluconate 550 MG TABS, Take 400 mg by mouth 2 (two) times a day, Disp: , Rfl:     Multiple Vitamin (MULTIVITAMIN ADULT PO), Take by mouth, Disp: , Rfl:     predniSONE 20 mg tablet, Take 20 mg by mouth daily, Disp: , Rfl:     Restasis 0.05 % ophthalmic emulsion, , Disp: , Rfl:     VITAMIN D PO, Take by mouth, Disp: , Rfl:     Cyanocobalamin 5000 MCG SUBL, Place under the tongue (Patient not taking: Reported on 3/28/2024), Disp: , Rfl:     Fe Bisgly-Vit C-Vit B12-FA 28-60-0.008-0.4 MG CAPS, Take by mouth (Patient not taking: Reported on 3/28/2024), Disp: , Rfl:     Ginkgo Biloba (GINKOBA PO), Take by mouth (Patient not taking: Reported on 3/28/2024), Disp: , Rfl:       Objective:    Vitals:    04/03/24 1337   BP: 110/68   Weight: 60.8 kg (134 lb)   Height: 5' 5\" (1.651 m)       Physical Exam  General: Well appearing, well nourished, in no acute distress. Oriented x 3, normal mood and affect.  Ambulating without difficulty.  Skin: Good turgor, no rash, unusual bruising or prominent lesions.  Hair: Normal texture and distribution.  Nails: Normal color, no deformities.  HEENT:  Head: Normocephalic, atraumatic.  Eyes: Conjunctiva clear, sclera non-icteric, EOM intact.  Nose: No external lesions, mucosa non-inflamed.  Mouth: Mucous membranes moist, no mucosal " lesions.  Neck: Supple   Musculoskeletal:   No asymmetry or deformities noted of bilateral upper and lower extremities.  No tenderness to palpation or swelling of joints bilaterally to include: shoulder, elbow, wrist, MCP I-V, PIP I-V, knee, ankle, MTP I-V.  Neurologic: Alert and oriented. No focal neurological deficits appreciated.   Psychiatric: Normal mood and affect.       Javier Dolan PA-C  Rheumatology

## 2024-04-04 LAB
B BURGDOR IGG+IGM SER QL IA: NEGATIVE
C-ANCA TITR SER IF: NORMAL TITER
MYELOPEROXIDASE AB SER IA-ACNC: <0.2 UNITS (ref 0–0.9)
P-ANCA ATYPICAL TITR SER IF: NORMAL TITER
P-ANCA TITR SER IF: NORMAL TITER
PROTEINASE3 AB SER IA-ACNC: <0.2 UNITS (ref 0–0.9)

## 2024-04-08 ENCOUNTER — APPOINTMENT (OUTPATIENT)
Dept: RADIOLOGY | Facility: HOSPITAL | Age: 29
End: 2024-04-08
Payer: COMMERCIAL

## 2024-04-08 ENCOUNTER — NURSE TRIAGE (OUTPATIENT)
Age: 29
End: 2024-04-08

## 2024-04-08 ENCOUNTER — HOSPITAL ENCOUNTER (OUTPATIENT)
Facility: HOSPITAL | Age: 29
Setting detail: OBSERVATION
Discharge: HOME | End: 2024-04-10
Attending: EMERGENCY MEDICINE | Admitting: INTERNAL MEDICINE
Payer: COMMERCIAL

## 2024-04-08 ENCOUNTER — APPOINTMENT (EMERGENCY)
Dept: RADIOLOGY | Facility: HOSPITAL | Age: 29
End: 2024-04-08
Payer: COMMERCIAL

## 2024-04-08 DIAGNOSIS — G62.9 NEUROPATHY: ICD-10-CM

## 2024-04-08 DIAGNOSIS — R47.1 DYSARTHRIA: Primary | ICD-10-CM

## 2024-04-08 DIAGNOSIS — R21 RASH AND NONSPECIFIC SKIN ERUPTION: ICD-10-CM

## 2024-04-08 LAB
ANION GAP SERPL CALCULATED.3IONS-SCNC: 10 MMOL/L (ref 4–13)
APTT PPP: 27 SECONDS (ref 23–37)
ATRIAL RATE: 111 BPM
BACTERIA UR QL AUTO: ABNORMAL /HPF
BASOPHILS # BLD AUTO: 0.04 THOUSANDS/ÂΜL (ref 0–0.1)
BASOPHILS NFR BLD AUTO: 0 % (ref 0–1)
BILIRUB UR QL STRIP: NEGATIVE
BUN SERPL-MCNC: 9 MG/DL (ref 5–25)
CALCIUM SERPL-MCNC: 9 MG/DL (ref 8.4–10.2)
CARDIAC TROPONIN I PNL SERPL HS: <2 NG/L
CHLORIDE SERPL-SCNC: 106 MMOL/L (ref 96–108)
CLARITY UR: ABNORMAL
CO2 SERPL-SCNC: 23 MMOL/L (ref 21–32)
COLOR UR: YELLOW
CREAT SERPL-MCNC: 0.72 MG/DL (ref 0.6–1.3)
EOSINOPHIL # BLD AUTO: 0.03 THOUSAND/ÂΜL (ref 0–0.61)
EOSINOPHIL NFR BLD AUTO: 0 % (ref 0–6)
ERYTHROCYTE [DISTWIDTH] IN BLOOD BY AUTOMATED COUNT: 12.6 % (ref 11.6–15.1)
EXT PREGNANCY TEST URINE: NEGATIVE
EXT. CONTROL: NORMAL
FLUAV RNA RESP QL NAA+PROBE: NEGATIVE
FLUBV RNA RESP QL NAA+PROBE: NEGATIVE
GFR SERPL CREATININE-BSD FRML MDRD: 114 ML/MIN/1.73SQ M
GLUCOSE SERPL-MCNC: 110 MG/DL (ref 65–140)
GLUCOSE SERPL-MCNC: 112 MG/DL (ref 65–140)
GLUCOSE UR STRIP-MCNC: NEGATIVE MG/DL
HCT VFR BLD AUTO: 38.3 % (ref 34.8–46.1)
HGB BLD-MCNC: 12.9 G/DL (ref 11.5–15.4)
HGB UR QL STRIP.AUTO: ABNORMAL
IMM GRANULOCYTES # BLD AUTO: 0.07 THOUSAND/UL (ref 0–0.2)
IMM GRANULOCYTES NFR BLD AUTO: 1 % (ref 0–2)
INR PPP: 1.15 (ref 0.84–1.19)
KETONES UR STRIP-MCNC: NEGATIVE MG/DL
LEUKOCYTE ESTERASE UR QL STRIP: ABNORMAL
LYMPHOCYTES # BLD AUTO: 1.27 THOUSANDS/ÂΜL (ref 0.6–4.47)
LYMPHOCYTES NFR BLD AUTO: 10 % (ref 14–44)
MCH RBC QN AUTO: 29.5 PG (ref 26.8–34.3)
MCHC RBC AUTO-ENTMCNC: 33.7 G/DL (ref 31.4–37.4)
MCV RBC AUTO: 87 FL (ref 82–98)
MONOCYTES # BLD AUTO: 0.52 THOUSAND/ÂΜL (ref 0.17–1.22)
MONOCYTES NFR BLD AUTO: 4 % (ref 4–12)
MUCOUS THREADS UR QL AUTO: ABNORMAL
NEUTROPHILS # BLD AUTO: 10.83 THOUSANDS/ÂΜL (ref 1.85–7.62)
NEUTS SEG NFR BLD AUTO: 85 % (ref 43–75)
NITRITE UR QL STRIP: NEGATIVE
NON-SQ EPI CELLS URNS QL MICRO: ABNORMAL /HPF
NRBC BLD AUTO-RTO: 0 /100 WBCS
P AXIS: 75 DEGREES
PH UR STRIP.AUTO: 7 [PH] (ref 4.5–8)
PLATELET # BLD AUTO: 310 THOUSANDS/UL (ref 149–390)
PLATELET # BLD AUTO: 325 THOUSANDS/UL (ref 149–390)
PMV BLD AUTO: 9.2 FL (ref 8.9–12.7)
PMV BLD AUTO: 9.3 FL (ref 8.9–12.7)
POTASSIUM SERPL-SCNC: 4.1 MMOL/L (ref 3.5–5.3)
PR INTERVAL: 140 MS
PROT UR STRIP-MCNC: NEGATIVE MG/DL
PROTHROMBIN TIME: 14.6 SECONDS (ref 11.6–14.5)
QRS AXIS: 88 DEGREES
QRSD INTERVAL: 80 MS
QT INTERVAL: 324 MS
QTC INTERVAL: 440 MS
RBC # BLD AUTO: 4.38 MILLION/UL (ref 3.81–5.12)
RBC #/AREA URNS AUTO: ABNORMAL /HPF
RSV RNA RESP QL NAA+PROBE: NEGATIVE
SARS-COV-2 RNA RESP QL NAA+PROBE: NEGATIVE
SODIUM SERPL-SCNC: 139 MMOL/L (ref 135–147)
SP GR UR STRIP.AUTO: 1.01 (ref 1–1.03)
T WAVE AXIS: 52 DEGREES
T4 FREE SERPL-MCNC: 0.77 NG/DL (ref 0.61–1.12)
TSH SERPL DL<=0.05 MIU/L-ACNC: 5.07 UIU/ML (ref 0.45–4.5)
UROBILINOGEN UR QL STRIP.AUTO: 0.2 E.U./DL
VENTRICULAR RATE: 111 BPM
WBC # BLD AUTO: 12.76 THOUSAND/UL (ref 4.31–10.16)
WBC #/AREA URNS AUTO: ABNORMAL /HPF

## 2024-04-08 PROCEDURE — 84443 ASSAY THYROID STIM HORMONE: CPT

## 2024-04-08 PROCEDURE — 99291 CRITICAL CARE FIRST HOUR: CPT | Performed by: PSYCHIATRY & NEUROLOGY

## 2024-04-08 PROCEDURE — 84439 ASSAY OF FREE THYROXINE: CPT

## 2024-04-08 PROCEDURE — 85025 COMPLETE CBC W/AUTO DIFF WBC: CPT

## 2024-04-08 PROCEDURE — 85049 AUTOMATED PLATELET COUNT: CPT | Performed by: INTERNAL MEDICINE

## 2024-04-08 PROCEDURE — 93010 ELECTROCARDIOGRAM REPORT: CPT | Performed by: INTERNAL MEDICINE

## 2024-04-08 PROCEDURE — NC001 PR NO CHARGE: Performed by: PSYCHIATRY & NEUROLOGY

## 2024-04-08 PROCEDURE — 70498 CT ANGIOGRAPHY NECK: CPT

## 2024-04-08 PROCEDURE — 36415 COLL VENOUS BLD VENIPUNCTURE: CPT

## 2024-04-08 PROCEDURE — 85730 THROMBOPLASTIN TIME PARTIAL: CPT

## 2024-04-08 PROCEDURE — 80307 DRUG TEST PRSMV CHEM ANLYZR: CPT

## 2024-04-08 PROCEDURE — 70496 CT ANGIOGRAPHY HEAD: CPT

## 2024-04-08 PROCEDURE — 81001 URINALYSIS AUTO W/SCOPE: CPT

## 2024-04-08 PROCEDURE — A9585 GADOBUTROL INJECTION: HCPCS

## 2024-04-08 PROCEDURE — 82948 REAGENT STRIP/BLOOD GLUCOSE: CPT

## 2024-04-08 PROCEDURE — 93005 ELECTROCARDIOGRAM TRACING: CPT

## 2024-04-08 PROCEDURE — 87086 URINE CULTURE/COLONY COUNT: CPT

## 2024-04-08 PROCEDURE — 81025 URINE PREGNANCY TEST: CPT

## 2024-04-08 PROCEDURE — 85610 PROTHROMBIN TIME: CPT

## 2024-04-08 PROCEDURE — 84484 ASSAY OF TROPONIN QUANT: CPT

## 2024-04-08 PROCEDURE — 80048 BASIC METABOLIC PNL TOTAL CA: CPT

## 2024-04-08 PROCEDURE — 0241U HB NFCT DS VIR RESP RNA 4 TRGT: CPT

## 2024-04-08 PROCEDURE — 99223 1ST HOSP IP/OBS HIGH 75: CPT | Performed by: INTERNAL MEDICINE

## 2024-04-08 PROCEDURE — 70553 MRI BRAIN STEM W/O & W/DYE: CPT

## 2024-04-08 RX ORDER — FLUTICASONE PROPIONATE 50 MCG
1 SPRAY, SUSPENSION (ML) NASAL DAILY
Status: DISCONTINUED | OUTPATIENT
Start: 2024-04-08 | End: 2024-04-10 | Stop reason: HOSPADM

## 2024-04-08 RX ORDER — ASPIRIN 81 MG/1
81 TABLET, CHEWABLE ORAL DAILY
Status: DISCONTINUED | OUTPATIENT
Start: 2024-04-08 | End: 2024-04-08

## 2024-04-08 RX ORDER — ACETAMINOPHEN 325 MG/1
975 TABLET ORAL ONCE
Status: COMPLETED | OUTPATIENT
Start: 2024-04-08 | End: 2024-04-08

## 2024-04-08 RX ORDER — GADOBUTROL 604.72 MG/ML
6 INJECTION INTRAVENOUS
Status: COMPLETED | OUTPATIENT
Start: 2024-04-08 | End: 2024-04-08

## 2024-04-08 RX ORDER — ENOXAPARIN SODIUM 100 MG/ML
40 INJECTION SUBCUTANEOUS DAILY
Status: DISCONTINUED | OUTPATIENT
Start: 2024-04-08 | End: 2024-04-10 | Stop reason: HOSPADM

## 2024-04-08 RX ORDER — DICYCLOMINE HYDROCHLORIDE 10 MG/1
10 CAPSULE ORAL 3 TIMES DAILY PRN
Status: DISCONTINUED | OUTPATIENT
Start: 2024-04-08 | End: 2024-04-10 | Stop reason: HOSPADM

## 2024-04-08 RX ADMIN — GADOBUTROL 6 ML: 604.72 INJECTION INTRAVENOUS at 17:04

## 2024-04-08 RX ADMIN — IOHEXOL 85 ML: 350 INJECTION, SOLUTION INTRAVENOUS at 12:14

## 2024-04-08 RX ADMIN — ENOXAPARIN SODIUM 40 MG: 40 INJECTION SUBCUTANEOUS at 15:49

## 2024-04-08 RX ADMIN — ACETAMINOPHEN 975 MG: 325 TABLET, FILM COATED ORAL at 14:06

## 2024-04-08 RX ADMIN — SODIUM CHLORIDE 1000 ML: 0.9 INJECTION, SOLUTION INTRAVENOUS at 12:22

## 2024-04-08 RX ADMIN — ASPIRIN 81 MG CHEWABLE TABLET 81 MG: 81 TABLET CHEWABLE at 15:49

## 2024-04-08 NOTE — LETTER
[facility]  [address]  [place] [address]  Dept: [phone]    April 9, 2024     Patient: Maryse Reid   YOB: 1995   Date of Visit: 4/8/2024       To Whom it May Concern:    Maryse Reid is under my professional care. She was seen in the hospital from 4/8/2024 to 04/09/24. She may return to work on 4/10 without limitations as long as feeling well.    If you have any questions or concerns, please don't hesitate to call.         Sincerely,          Jessika Anand PA-C  [facility] Hospitalist Service

## 2024-04-08 NOTE — Clinical Note
Case was discussed with  and the patient's admission status was agreed to be Admission Status: observation status to the service of Dr. Villanueva

## 2024-04-08 NOTE — CONSULTS
NEUROLOGY RESIDENCY - STROKE ALERT   Name: Maryse Reid Age: 29 y.o. Sex:female  MRN: [id number]   Unit/Bed#: @DBLINK(marcell,72300) @ Encounter: [id number] Length of Stay: 0  ASSESSMENT & PLAN   # Dysarthria   Maryse Reid is a 29 y.o. female with pertinent PMhx of ADHD, Anxiety, Depression, GERD who presented as a stroke alert on 4/8/24 at 12:03 AM for evaluation of dysarthria and upper extremity tremors. Initial NIHSS 1 (mild-mod dysarthria) and LKW 8:30 AM. Initial BP Blood Pressure: 160/72, FSBG POC Glucose: 110.. CTH revealed no hemorrhage or acute intracranial pathology and CTA H/N showed no LVO or high grade stenosis. As a result of Symptoms resolved/clearly non disabling patient was not determined to be a candidate for     Pertinent scores:  Initial NIHSS: Total: 1   Modified Karoline Score: 0 (No baseline symptoms/disability)    Workup:  - CTH: No acute hemorrhage  - CTA: No evidence of LVO or high grade stenosis  - MRI: Pending  - Labs: Hemoglobin A1c 5.2 (9/29/2023), LDL 65 (9/29/2023)    Impression: 29 y.o. female presenting with dysarthria and tremulous upper extremities that began this morning. Low suspicion for stroke however cannot fully r/o therefore recommending admission for MR Brain w/wo.      Plan: Discussed plan with neurology attending, Dr. Carmichael  Admit along the stroke pathway with telemetry monitoring  Strict NPO prior to dysphagia screen   Frequent neuro checks per protocol. If there is any acute changes in exam, please obtain stat CT head and notify neurology team  BP Goals: Permissive hypertension - SBP<220/110 for first 24 hrs.   Antiplatelet agents: ASA 81 mg   Anticoagulation: N/A  Statin: Can hold off pending LDL value  Labs: Hgb A1c, LDL   Brain Imaging: MRI Brain with and with  No need for ECHO  Euthermic/Euglycemic  DVT PPx: Enoxaparin (Lovenox), SCDs  PT/OT/ST evaluations when able  Stroke education and counseling  Rest of other care per primary team appreciated       Recommendations for outpatient neurological follow up have yet to be determined.   Pending for discharge: Stroke work up  CHIEF COMPLAINT     Chief Complaint   Patient presents with    Rash     Pt co/ rash on chest and shakiness this morning. Finished clindamycin for strep on Thursday. Also reports dry mouth and anxiety denies tightness in throat     Consult to Neurology  Consult performed by: Ashish Henderson DO  Consult ordered by: Carlyle Lewis MD        HISTORY OF PRESENT ILLNESS   Reason for Consult: Stroke Alert  Stroke alert called: 12:03 PM  Neurology time of arrival: Immediate  Hx and PE limited by:none  Patient Last Known Well: 8:30 AM    HPI: Maryse Reid is a 29 y.o. female with a pertinent PMhx of migraines, ADHD, anxiety, and recent strep throat neurology requested at bedside for stroke alert evaluation. Stroke alert activated as patient presented with dysarthria and b/l upper extremity tremors. Last known well reported to be 8:30 AM. On arrival to the emergency department, pt had an initial Blood Pressure: 160/72, FSBG POC Glucose: 110. NIHSS 1 for mild-moderate dysarthria. NC CTH revealed no acute intracranial hemorrhage mass effect or edema and CTA H/N showed no evidence of LVO or high grade stenosis.    Stroke risk factors: None.  Prior stroke history: none.   The patient is not on chronic anticoagulation or takes any antiplatelet therapy at home.     TNK Decision: Not administered as patient not a candidate for thrombolytic medications.  Contraindication(s) to TNK administration: No contraindications noted and Symptoms resolved/clearly non disabling as patient has NIHSS 1.     Review of previous medical records was completed.   REVIEW OF SYSTEMS   ROS: See HPI for details  HISTORICAL INFORMATION   Past Medical History:  has a past medical history of Abdominal pain, ADHD, Anemia, Anxiety, Chlamydia, Diarrhea, GERD (gastroesophageal reflux disease), Migraine, Mononucleosis,  Ovarian cyst, and PONV (postoperative nausea and vomiting).   Past Surgical History:  has a past surgical history that includes Sinus surgery and [place] tooth extraction.  Family History: family history includes Alzheimer's disease in her paternal grandfather; Cancer in her maternal grandfather; Diabetes in her paternal grandmother; Hypertension in her father, maternal grandmother, and mother; Lupus in her paternal grandmother; No Known Problems in her brother.  Social History:  reports that she has never smoked. She has never used smokeless tobacco. She reports current alcohol use of about 1.0 standard drink of alcohol per week. She reports that she does not use drugs.   Allergies:   Allergies   Allergen Reactions    Augmentin [Amoxicillin-Pot Clavulanate] Facial Swelling      OBJECTIVE   Patient ID: Maryse Reid is a 29 y.o. female.  Patient Vitals for the past 8 hrs:   BP Temp Pulse Resp SpO2 Weight   04/08/24 1257 -- -- -- -- -- 61.3 kg (135 lb 2.3 oz)   04/08/24 1230 137/68 -- (!) 106 22 100 % --   04/08/24 1215 126/69 -- (!) 116 20 100 % --   04/08/24 1205 138/87 -- (!) 108 22 100 % --   04/08/24 1015 -- -- 98 -- 100 % --   04/08/24 0953 160/72 98 °F (36.7 °C) (!) 116 (!) 26 100 % --       NEUROLOGIC  EXAM:  Mental Status:   Alertness: alert  Orientation: time, date, person, place, city, president  Speech: Pressured, stuttering, dysarthric speech  Cranial Nerves:  II: Pupils equal, round, reactive to light and accommodation, Visual Fields normal  III, IV, VI: EOM full and intact  V: facial sensation was normal and symmetrical  VII: Facial Symmetry: facial symmetry equal  VIII: normal hearing to speech  IX, X: normal palatal elevation, no uvular deviation  XI: 5/5 head turn and 5/5 shoulder shrug bilaterally  XII: Tongue midline  Motor: Normal bulk, tone, no involuntary movements or tremors     DELTOID   BICEP   TRICEPS   WRIST  EXTENSION   WRIST  FLEXION   DORSAL  INTEROSSEI      RIGHT 5 5 5 5 5 5 5  "  LEFT 5 5 5 5 5 5 5      HIP  FLEXION KNEE  EXTENSION DORSI   PLANTAR     RIGHT 5 5 5 5   LEFT 5 5 5 5   Reflexes: No clonus, no Centeno's, no cross abductors, toes down     BICEP   TRICEPS   BRACHIO   PATELLAR   ACHILLES   RIGHT 2+ 2+ 2+ 2+ 2+   LEFT 2+ 2+ 2+ 2+ 2+   Sensory:Normal light touch sensation and Normal pin prick sensation  Coordination: Cerebellar arm drift absent, Finger-to-nose, bilaterally intact, Heel To Amado normal bilaterally  Station/Gait: deferred d/t medical acuity      NIH Stroke Scale  1a.Level of Consciousness: 0 = Alert   1b. LOC Questions: 0 = Answers both correctly   1c. LOC Commands: 0 = Obeys both correctly   2. Best Gaze: 0 = Normal   3. Visual: 0 = No visual field loss   4. Facial Palsy: 0=Normal symmetric movement   5a. Motor Right Arm: 0 = No drift, limb holds 90 (or 45) degrees for full 10 seconds   5b. Motor Left Arm: 0 = No drift, limb holds 90 (or 45) degrees for full 10 seconds   6a. Motor Right Le = No drift, limb holds 30 degrees for full 5 seconds   6b. Motor Left Le = No drift, limb holds 30 degrees for full 5 seconds   7. Limb Ataxia:  0 = Absent   8. Sensory: 0 = Normal; no sensory loss   9. Best Language:  0 = No aphasia, normal   10. Dysarthria: 1 = Mild to moderate,slurs at least some words and at worst, can be understood with some difficulty   11. Extinction: 0=No abnormality   Total Score:  1   Person Administering Scale: Ashish Henderson DO  Interval: Baseline (time of stroke alert)  Time NIHSS Completed: Upon neurology team arrival    Modified Karoline Score: 0 (No baseline symptoms/disability)     LABORATORY DATA, IMAGING & DIAGNOSTIC TESTING     Labs: I have personally reviewed pertinent reports.    Results from last 7 days   Lab Units 24  1159   WBC Thousand/uL 12.76*   HEMOGLOBIN g/dL 12.9   HEMATOCRIT % 38.3   PLATELETS Thousands/uL 325   NEUTROS PCT % 85*   MONOS PCT % 4   EOS PCT % 0          Invalid input(s): \"LABALBU\"           Results " from last 7 days   Lab Units 04/08/24  1222   INR  1.15   PTT seconds 27             Micro:   Lab Results   Component Value Date    URINECX 40,000-49,000 cfu/ml 03/01/2024     Radiology Results: I have personally reviewed pertinent reports and I have personally reviewed pertinent films in PACS  CT stroke alert brain   Final Result by Florentin Alberts MD (04/08 1225)      No acute intracranial hemorrhage, mass effect or edema.            Workstation performed: [id number]         CTA stroke alert (head/neck)   Final Result by Florentin Alberts MD (04/08 1234)         1. No hemodynamically significant stenosis in the major arteries of the neck.   2. No intracranial aneurysm or major intracranial arterial stenosis or occlusion.               I personally provided preliminary results of this study with AIDA LOAIZA, Jeff Howard and Alex Carmichael on 4/8/2024 12:29 PM.                                 Workstation performed: [id number]           Other Diagnostic Testing: I have personally reviewed pertinent reports.      ACTIVE MEDICATIONS     No current facility-administered medications for this encounter.       Prior to Admission medications    Medication Sig Start Date End Date Taking? Authorizing Provider   Cyanocobalamin 5000 MCG SUBL Place under the tongue  Patient not taking: Reported on 3/28/2024    Historical Provider, MD   dicyclomine (BENTYL) 10 mg capsule Take 1 capsule (10 mg total) by mouth 3 (three) times a day as needed (abdominal cramping) 11/9/23   Lam Calderon PA-C   Fe Bisgly-Vit C-Vit B12-FA 28-60-0.008-0.4 MG CAPS Take by mouth  Patient not taking: Reported on 3/28/2024    Historical Provider, MD   fluticasone (FLONASE) 50 mcg/act nasal spray 1 spray into each nostril daily 3/19/24   ROSA M Isabel   Ginkgo Biloba (GINKOBA PO) Take by mouth  Patient not taking: Reported on 3/28/2024    Historical Provider, MD   Magnesium Gluconate 550 MG TABS Take 400 mg by mouth 2 (two)  "times a day    Historical Provider, MD   Multiple Vitamin (MULTIVITAMIN ADULT PO) Take by mouth    Historical Provider, MD   Restasis 0.05 % ophthalmic emulsion  9/25/23   Historical Provider, MD   VITAMIN D PO Take by mouth    Historical Provider, MD       VTE Pharmacologic Prophylaxis: Per primary  VTE Mechanical Prophylaxis: Sequential Compression Device  CODE STATUS & ADVANCED DIRECTIVES   Code Status: No Order  Advance Directive and Living Will:      Power of :    POLST:    =======================================================================  I have discussed the patient's history, physical exam findings, assessment, and plan in detail with attending, Dr. Amol Henderson,    [facility] Neurology Residency, PGY-2    Portions of the record may have been created with voice recognition software.  Occasional wrong word or \"sound a like\" substitutions may have occurred due to the inherent limitations of voice recognition software.  Read the chart carefully and recognize, using context, where substitutions have occurred.  "

## 2024-04-08 NOTE — ASSESSMENT & PLAN NOTE
Stroke alert activated.  Neurology recommending MRI.  Note negative workup as per rheumatology as outpatient  Observation status

## 2024-04-08 NOTE — CASE MANAGEMENT
Case Management Assessment & Discharge Planning Note    Patient name Maryse Reid  Location ED ED 02 MRN [id number]  : 1995 Date 2024       Current Admission Date: 2024  Current Admission Diagnosis:Dysarthria   Patient Active Problem List    Diagnosis Date Noted    Dysarthria 2024    PONV (postoperative nausea and vomiting) 2023    Migraine 2023    GERD (gastroesophageal reflux disease) 2023    Anxiety 2023    ADHD 2023    PCOS (polycystic ovarian syndrome) 2023    Xerostomia 10/31/2023    Dysmenorrhea 2023    Iron deficiency anemia 2022    Fatigue 2022      LOS (days): 0  Geometric Mean LOS (GMLOS) (days):   Days to GMLOS:     OBJECTIVE:              Current admission status: Observation  Referral Reason: Stroke    Preferred Pharmacy:   [id number] [place] - [address] [place] [id number]  Phone: [phone] Fax: [phone]    Primary Care Provider: ROSA M Villarreal    Primary Insurance: [facility]  Secondary Insurance:     ASSESSMENT:  Active Health Care Proxies    There are no active Health Care Proxies on file.       Readmission Root Cause  30 Day Readmission: No    Patient Information  Admitted from:: Home  Mental Status: Alert  During Assessment patient was accompanied by: Parent  Assessment information provided by:: Patient  Primary Caregiver: Self  Support Systems: Self, Parent  County of Residence: [place]  What city do you live in[place] [facility]  Home entry access options. Select all that apply.: Stairs  Number of steps to enter home.: 2  Type of Current Residence: 2 story home  Upon entering residence, is there a bedroom on the main floor (no further steps)?: No  A bedroom is located on the following floor levels of residence (select all that apply):: Basement  Upon entering residence, is there a bathroom on the main floor (no further steps)?: Yes  Number of  steps to basement from main floor: One Flight  Living Arrangements: Lives w/ Parent(s)    Activities of Daily Living Prior to Admission  Functional Status: Independent  Completes ADLs independently?: Yes  Ambulates independently?: Yes  Does patient use assisted devices?: No  Does patient currently own DME?: No  Does patient have a history of Outpatient Therapy (PT/OT)?: No  Does the patient have a history of Short-Term Rehab?: No  Does patient have a history of HHC?: No  Does patient currently have HHC?: No    Patient Information Continued  Income Source: Employed  Does patient have prescription coverage?: Yes  Does patient receive dialysis treatments?: No  Does patient have a history of substance abuse?: No  Does patient have a history of Mental Health Diagnosis?: Yes (ADHD, Depression, Anxiety)  Is patient receiving treatment for mental health?: Yes  Has patient received inpatient treatment related to mental health in the last 2 years?: No    Means of Transportation  Means of Transport to Appts:: Drives Self      Social Determinants of Health (SDOH)      Flowsheet Row Most Recent Value   Housing Stability    In the last 12 months, was there a time when you were not able to pay the mortgage or rent on time? N   In the last 12 months, how many places have you lived? 1   In the last 12 months, was there a time when you did not have a steady place to sleep or slept in a shelter (including now)? N   Transportation Needs    In the past 12 months, has lack of transportation kept you from medical appointments or from getting medications? no   In the past 12 months, has lack of transportation kept you from meetings, work, or from getting things needed for daily living? No   Food Insecurity    Within the past 12 months, you worried that your food would run out before you got the money to buy more. Never true   Within the past 12 months, the food you bought just didn't last and you didn't have money to get more. Never true    Utilities    In the past 12 months has the electric, gas, oil, or water company threatened to shut off services in your home? No            DISCHARGE DETAILS:    Discharge planning discussed with:: Patient and mother  Freedom of Choice: Yes     CM contacted family/caregiver?: Yes     Contacts  Patient Contacts: Geri- mother  Relationship to Patient:: Family  Contact Method: In Person  Reason/Outcome: Continuity of Care, Emergency Contact, Discharge Planning    Treatment Team Recommendation: Home  Discharge Destination Plan:: Home  Transport at Discharge : Family      Additional Comments: CM met with pt and pt's mother at bedside. Pt is independent at baseline with no needs.  Pt resides at home with her father in a 2 story home.  Pt's bedroom is in the basement and pt has no issues with the stairs at baseline. Pt with no needs from CM currently. CM remains available for future needs.

## 2024-04-08 NOTE — TELEPHONE ENCOUNTER
Regarding: adverse reaction to prednisone  ----- Message from Cheryl Schmidt MA sent at 4/8/2024  9:29 AM EDT -----  Adverse reaction  to the prednisone. Shaky and is having a rash that is spreading to neck

## 2024-04-08 NOTE — ED PROVIDER NOTES
History  Chief Complaint   Patient presents with    Rash     Pt co/ rash on chest and shakiness this morning. Finished clindamycin for strep on Thursday. Also reports dry mouth and anxiety denies tightness in throat     HPI    Patient is a 30 y/o F with PMH Hashimoto's presenting with rash and shakiness. Pt dx with pharyngeal GAS on 3/19, was started on amoxicillin, then switched to augmentin, then switched again to clindamyicn which she states she completed on Thursday. Was doing ok but started with non painful/pruritic chest rash when waking this morning. She then developed rash spreading to her face with associated dysarthria, tremulousness. Pt having significant shaking this morning, conscious but very tremulous. Feels anxious. Also c/o posterior and frontal HA. No vomiting, abd pain, fever. Pt states being worked up by rheum for unknown dx but frequent arthralgias. Pts concern is for MS but no formal dx.     Prior to Admission Medications   Prescriptions Last Dose Informant Patient Reported? Taking?   Cyanocobalamin 5000 MCG SUBL  Self Yes No   Sig: Place under the tongue   Patient not taking: Reported on 3/28/2024   Fe Bisgly-Vit C-Vit B12-FA 28-60-0.008-0.4 MG CAPS  Self Yes No   Sig: Take by mouth   Patient not taking: Reported on 3/28/2024   Ginkgo Biloba (GINKOBA PO)  Self Yes No   Sig: Take by mouth   Patient not taking: Reported on 3/28/2024   Magnesium Gluconate 550 MG TABS Past Week Self Yes Yes   Sig: Take 400 mg by mouth 2 (two) times a day   Multiple Vitamin (MULTIVITAMIN ADULT PO) 4/7/2024 Self Yes Yes   Sig: Take by mouth   NON FORMULARY 4/8/2024  Yes Yes   Sig: Citlaly Villegas   Restasis 0.05 % ophthalmic emulsion  Self Yes No   VITAMIN D PO 4/7/2024 Self Yes Yes   Sig: Take by mouth   dicyclomine (BENTYL) 10 mg capsule Unknown Self No No   Sig: Take 1 capsule (10 mg total) by mouth 3 (three) times a day as needed (abdominal cramping)   fluticasone (FLONASE) 50 mcg/act nasal spray 4/7/2024 Self No Yes    Si spray into each nostril daily      Facility-Administered Medications: None       Past Medical History:   Diagnosis Date    Abdominal pain     with nausea    ADHD     Anemia     Anxiety     Chlamydia     Diarrhea     GERD (gastroesophageal reflux disease)     Migraine     Mononucleosis     Ovarian cyst     PONV (postoperative nausea and vomiting)        Past Surgical History:   Procedure Laterality Date    SINUS SURGERY      WISDOM TOOTH EXTRACTION         Family History   Problem Relation Age of Onset    Hypertension Mother     Hypertension Father     No Known Problems Brother     Hypertension Maternal Grandmother     Cancer Maternal Grandfather         bladder    Lupus Paternal Grandmother     Diabetes Paternal Grandmother     Alzheimer's disease Paternal Grandfather      I have reviewed and agree with the history as documented.    E-Cigarette/Vaping    E-Cigarette Use Never User      E-Cigarette/Vaping Substances    Nicotine No     THC No     CBD No     Flavoring No     Other No     Unknown No      Social History     Tobacco Use    Smoking status: Never    Smokeless tobacco: Never   Vaping Use    Vaping status: Never Used   Substance Use Topics    Alcohol use: Yes     Alcohol/week: 1.0 standard drink of alcohol     Types: 1 Glasses of wine per week     Comment: socially weekly    Drug use: Never        Review of Systems   Constitutional:  Negative for chills and fever.   HENT:  Negative for ear pain and sore throat.    Eyes:  Negative for pain and visual disturbance.   Respiratory:  Negative for cough and shortness of breath.    Cardiovascular:  Negative for chest pain and palpitations.   Gastrointestinal:  Negative for abdominal pain and vomiting.   Genitourinary:  Negative for dysuria and hematuria.   Musculoskeletal:  Negative for arthralgias and back pain.   Skin:  Positive for rash. Negative for color change.   Neurological:  Positive for tremors and headaches. Negative for seizures and syncope.    All other systems reviewed and are negative.      Physical Exam  ED Triage Vitals   Temperature Pulse Respirations Blood Pressure SpO2   04/08/24 0953 04/08/24 0953 04/08/24 0953 04/08/24 0953 04/08/24 0953   98 °F (36.7 °C) (!) 116 (!) 26 160/72 100 %      Temp src Heart Rate Source Patient Position - Orthostatic VS BP Location FiO2 (%)   -- 04/08/24 1205 04/08/24 1205 -- --    Monitor Lying        Pain Score       04/08/24 1345       4             Orthostatic Vital Signs  Vitals:    04/08/24 1430 04/08/24 1445 04/08/24 1515 04/08/24 1541   BP: 129/66 129/61 115/75    Pulse: 84 65 79 84   Patient Position - Orthostatic VS:           Physical Exam  Vitals and nursing note reviewed.   Constitutional:       General: She is not in acute distress.  HENT:      Head: Normocephalic and atraumatic.      Right Ear: External ear normal.      Left Ear: External ear normal.      Nose: Nose normal.      Mouth/Throat:      Pharynx: Oropharynx is clear.   Eyes:      Extraocular Movements: Extraocular movements intact.      Pupils: Pupils are equal, round, and reactive to light.   Cardiovascular:      Rate and Rhythm: Normal rate and regular rhythm.      Pulses: Normal pulses.      Heart sounds: Normal heart sounds. No murmur heard.     No friction rub. No gallop.   Pulmonary:      Effort: Pulmonary effort is normal. No respiratory distress.      Breath sounds: Normal breath sounds. No wheezing, rhonchi or rales.   Abdominal:      General: Abdomen is flat. There is no distension.      Palpations: Abdomen is soft.      Tenderness: There is no abdominal tenderness. There is no guarding or rebound.   Musculoskeletal:         General: No deformity. Normal range of motion.      Cervical back: Normal range of motion.      Right lower leg: No edema.      Left lower leg: No edema.   Skin:     General: Skin is warm and dry.      Capillary Refill: Capillary refill takes less than 2 seconds.      Findings: Rash present.      Comments:  Papular erythematous rash to chest and face, non-painful to palpation, no lesions elsewhere on body.    Neurological:      Mental Status: She is alert and oriented to person, place, and time.      Gait: Gait normal.      Comments: +dysarthria, able to understand, no aphasia or facial droop. Normal coordination, normal strength/sensation. PERRL, no nystagmus.          ED Medications  Medications   dicyclomine (BENTYL) capsule 10 mg (has no administration in time range)   fluticasone (FLONASE) 50 mcg/act nasal spray 1 spray (1 spray Nasal Not Given 4/8/24 1555)   enoxaparin (LOVENOX) subcutaneous injection 40 mg (40 mg Subcutaneous Given 4/8/24 1549)   aspirin chewable tablet 81 mg (81 mg Oral Given 4/8/24 1549)   sodium chloride 0.9 % bolus 1,000 mL (0 mL Intravenous Stopped 4/8/24 1354)   iohexol (OMNIPAQUE) 350 MG/ML injection (MULTI-DOSE) 85 mL (85 mL Intravenous Given 4/8/24 1214)   acetaminophen (TYLENOL) tablet 975 mg (975 mg Oral Given 4/8/24 1406)       Diagnostic Studies  Results Reviewed       Procedure Component Value Units Date/Time    Platelet count [[id number]]  (Normal) Collected: 04/08/24 1550    Lab Status: Final result Specimen: Blood from Arm, Right Updated: 04/08/24 1602     Platelets 310 Thousands/uL      MPV 9.3 fL     HS Troponin I 2hr [[id number]] Collected: 04/08/24 1550    Lab Status: In process Specimen: Blood from Arm, Right Updated: 04/08/24 1559    HS Troponin I 2hr [[id number]] Collected: 04/08/24 1356    Lab Status: Final result Specimen: Blood from Arm, Right Updated: 04/08/24 1452     hs TnI 2hr <2 ng/L      Delta 2hr hsTnI --    HS Troponin I 4hr [[id number]]     Lab Status: No result Specimen: Blood     HS Troponin I 4hr [[id number]]     Lab Status: No result Specimen: Blood     Basic metabolic panel [[id number]] Collected: 04/08/24 1159    Lab Status: Final result Specimen: Blood from Arm, Right Updated: 04/08/24 1407     Sodium 139 mmol/L      Potassium 4.1 mmol/L      Chloride 106  "mmol/L      CO2 23 mmol/L      ANION GAP 10 mmol/L      BUN 9 mg/dL      Creatinine 0.72 mg/dL      Glucose 112 mg/dL      Calcium 9.0 mg/dL      eGFR 114 ml/min/1.73sq m     Narrative:      National Kidney Disease Foundation guidelines for Chronic Kidney Disease (CKD):     Stage 1 with normal or high GFR (GFR > 90 mL/min/1.73 square meters)    Stage 2 Mild CKD (GFR = 60-89 mL/min/1.73 square meters)    Stage 3A Moderate CKD (GFR = 45-59 mL/min/1.73 square meters)    Stage 3B Moderate CKD (GFR = 30-44 mL/min/1.73 square meters)    Stage 4 Severe CKD (GFR = 15-29 mL/min/1.73 square meters)    Stage 5 End Stage CKD (GFR <15 mL/min/1.73 square meters)  Note: GFR calculation is accurate only with a steady state creatinine    T4, free [[id number]]  (Normal) Collected: 04/08/24 1159    Lab Status: Final result Specimen: Blood from Arm, Right Updated: 04/08/24 1345     Free T4 0.77 ng/dL     Narrative:        \"Therapeutic range for patients medicated with thyroid disorders: 0.61-1.24 ng/dL.\"    FLU/RSV/COVID - if FLU/RSV clinically relevant [[id number]]  (Normal) Collected: 04/08/24 1222    Lab Status: Final result Specimen: Nares from Nose Updated: 04/08/24 1310     SARS-CoV-2 Negative     INFLUENZA A PCR Negative     INFLUENZA B PCR Negative     RSV PCR Negative    Narrative:      FOR PEDIATRIC PATIENTS - copy/paste COVID Guidelines URL to browser: [email or web address]    SARS-CoV-2 assay is a Nucleic Acid Amplification assay intended for the  qualitative detection of nucleic acid from SARS-CoV-2 in nasopharyngeal  swabs. Results are for the presumptive identification of SARS-CoV-2 RNA.    Positive results are indicative of infection with SARS-CoV-2, the virus  causing COVID-19, but do not rule out bacterial infection or co-infection  with other viruses. Laboratories within the [place] and its  territories are required to report all positive results to the " appropriate  public health authorities. Negative results do not preclude SARS-CoV-2  infection and should not be used as the sole basis for treatment or other  patient management decisions. Negative results must be combined with  clinical observations, patient history, and epidemiological information.  This test has not been FDA cleared or approved.    This test has been authorized by FDA under an Emergency Use Authorization  (EUA). This test is only authorized for the duration of time the  declaration that circumstances exist justifying the authorization of the  emergency use of an in vitro diagnostic tests for detection of SARS-CoV-2  virus and/or diagnosis of COVID-19 infection under section 564(b)(1) of  the Act, 21 U.S.C. 360bbb-3(b)(1), unless the authorization is terminated  or revoked sooner. The test has been validated but independent review by FDA  and CLIA is pending.    Test performed using [employer] GeneXpert: This RT-PCR assay targets N2,  a region unique to SARS-CoV-2. A conserved region in the E-gene was chosen  for pan-Sarbecovirus detection which includes SARS-CoV-2.    According to CMS-2020-01-R, this platform meets the definition of high-throughput technology.    Urine Microscopic [[id number]]  (Abnormal) Collected: 04/08/24 1203    Lab Status: Final result Specimen: Urine, Clean Catch Updated: 04/08/24 1309     RBC, UA None Seen /hpf      WBC, UA Innumerable /hpf      Epithelial Cells Occasional /hpf      Bacteria, UA Moderate /hpf      MUCUS THREADS Occasional    Urine culture [[id number]] Collected: 04/08/24 1203    Lab Status: In process Specimen: Urine, Clean Catch Updated: 04/08/24 1309    HS Troponin 0hr (reflex protocol) [[id number]]  (Normal) Collected: 04/08/24 1222    Lab Status: Final result Specimen: Blood from Arm, Right Updated: 04/08/24 1308     hs TnI 0hr <2 ng/L     TSH, 3rd generation with Free T4 reflex [[id number]]  (Abnormal) Collected: 04/08/24 1159    Lab Status: Final result  Specimen: Blood from Arm, Right Updated: 04/08/24 1306     TSH 3RD GENERATON 5.065 uIU/mL     Protime-INR [[id number]]  (Abnormal) Collected: 04/08/24 1222    Lab Status: Final result Specimen: Blood from Arm, Right Updated: 04/08/24 1252     Protime 14.6 seconds      INR 1.15    APTT [[id number]]  (Normal) Collected: 04/08/24 1222    Lab Status: Final result Specimen: Blood from Arm, Right Updated: 04/08/24 1252     PTT 27 seconds     CBC and differential [[id number]]  (Abnormal) Collected: 04/08/24 1159    Lab Status: Final result Specimen: Blood from Arm, Right Updated: 04/08/24 1218     WBC 12.76 Thousand/uL      RBC 4.38 Million/uL      Hemoglobin 12.9 g/dL      Hematocrit 38.3 %      MCV 87 fL      MCH 29.5 pg      MCHC 33.7 g/dL      RDW 12.6 %      MPV 9.2 fL      Platelets 325 Thousands/uL      nRBC 0 /100 WBCs      Neutrophils Relative 85 %      Immature Grans % 1 %      Lymphocytes Relative 10 %      Monocytes Relative 4 %      Eosinophils Relative 0 %      Basophils Relative 0 %      Neutrophils Absolute 10.83 Thousands/µL      Absolute Immature Grans 0.07 Thousand/uL      Absolute Lymphocytes 1.27 Thousands/µL      Absolute Monocytes 0.52 Thousand/µL      Eosinophils Absolute 0.03 Thousand/µL      Basophils Absolute 0.04 Thousands/µL     POCT pregnancy, urine [[id number]]  (Normal) Resulted: 04/08/24 1208    Lab Status: Final result Updated: 04/08/24 1208     EXT Preg Test, Ur Negative     Control Valid    Urine Macroscopic, POC [[id number]]  (Abnormal) Collected: 04/08/24 1203    Lab Status: Final result Specimen: Urine Updated: 04/08/24 1205     Color, UA Yellow     Clarity, UA Slightly Cloudy     pH, UA 7.0     Leukocytes, UA Small     Nitrite, UA Negative     Protein, UA Negative mg/dl      Glucose, UA Negative mg/dl      Ketones, UA Negative mg/dl      Urobilinogen, UA 0.2 E.U./dl      Bilirubin, UA Negative     Occult Blood, UA Trace     Specific Gravity, UA 1.015    Narrative:      CLINITEK RESULT     Fingerstick Glucose (POCT) [[id number]]  (Normal) Collected: 04/08/24 1153    Lab Status: Final result Specimen: Blood Updated: 04/08/24 1202     POC Glucose 110 mg/dl                    CT stroke alert brain   Final Result by Florentin Alberts MD (04/08 1225)      No acute intracranial hemorrhage, mass effect or edema.            Workstation performed: [id number]         CTA stroke alert (head/neck)   Final Result by Florentin Alberts MD (04/08 1234)         1. No hemodynamically significant stenosis in the major arteries of the neck.   2. No intracranial aneurysm or major intracranial arterial stenosis or occlusion.               I personally provided preliminary results of this study with AIDA LOAIZA, Jeff Howard and Alex Carmichael on 4/8/2024 12:29 PM.                                 Workstation performed: [id number]         MRI Inpatient Order    (Results Pending)         Procedures  Procedures      ED Course                                       Medical Decision Making  30 y/o F presenting with new rash in setting of recent abx and strep infection as well as new dysarthria. VSS. BS normal. Pt has concerning exam, stroke alert called. CT/CTA appear normal. Neurology recommending admission for MRI although do not feel stroke is likely. Admitted to [facility] for further evaluation.     Amount and/or Complexity of Data Reviewed  Labs: ordered.  Radiology: ordered.    Risk  OTC drugs.  Prescription drug management.  Decision regarding hospitalization.          Disposition  Final diagnoses:   Dysarthria   Rash and nonspecific skin eruption     Time reflects when diagnosis was documented in both MDM as applicable and the Disposition within this note       Time User Action Codes Description Comment    4/8/2024 12:01 PM Junior Howard Add [R47.1] Dysarthria     4/8/2024  4:21 PM Junior Howard Add [R21] Rash and nonspecific skin eruption           ED Disposition       ED Disposition   Admit    Condition   Stable     Date/Time   Mon Apr 8, 2024  1:57 PM    Comment   Case was discussed with FANYN and the patient's admission status was agreed to be Admission Status: observation status to the service of Dr. Leggett.               Follow-up Information    None         Current Discharge Medication List        CONTINUE these medications which have NOT CHANGED    Details   fluticasone (FLONASE) 50 mcg/act nasal spray 1 spray into each nostril daily  Qty: 9.9 mL, Refills: 0    Associated Diagnoses: Sore throat (viral)      Magnesium Gluconate 550 MG TABS Take 400 mg by mouth 2 (two) times a day      Multiple Vitamin (MULTIVITAMIN ADULT PO) Take by mouth      NON FORMULARY Citlaly Villgeas      VITAMIN D PO Take by mouth      Cyanocobalamin 5000 MCG SUBL Place under the tongue      dicyclomine (BENTYL) 10 mg capsule Take 1 capsule (10 mg total) by mouth 3 (three) times a day as needed (abdominal cramping)  Qty: 40 capsule, Refills: 5    Associated Diagnoses: LLQ pain      Fe Bisgly-Vit C-Vit B12-FA 28-60-0.008-0.4 MG CAPS Take by mouth      Ginkgo Biloba (GINKOBA PO) Take by mouth      Restasis 0.05 % ophthalmic emulsion            No discharge procedures on file.    PDMP Review       None             ED Provider  Attending physically available and evaluated Maryse Oliva I managed the patient along with the ED Attending.    Electronically Signed by           Junior Howard MD  04/08/24 4076

## 2024-04-08 NOTE — H&P
[facility]  H&P  Name: Maryse Reid 29 y.o. female I MRN: [id number]  Unit/Bed#: CW2 218-01 I Date of Admission: 4/8/2024   Date of Service: 4/8/2024 I Hospital Day: 0      Assessment/Plan   * Dysarthria  Assessment & Plan  Stroke alert activated.  Neurology recommending MRI.  Note negative workup as per rheumatology as outpatient  Observation status      GERD (gastroesophageal reflux disease)  Assessment & Plan  Currently on Bentyl for chronic GI symptoms.  Add PPI    Fatigue  Assessment & Plan  Appears to be chronic in nature.  Ongoing surveillance with rheumatology.         VTE Prophylaxis: Enoxaparin (Lovenox)  / sequential compression device   Code Status: fc  POLST: There is no POLST form on file for this patient (pre-hospital)      Anticipated Length of Stay:  Patient will be admitted on an Observation basis with an anticipated length of stay of  < 2 midnights.   Justification for Hospital Stay: need to monitor symptoms    Total Time for Visit, including Counseling / Coordination of Care:  85 .  Greater than 50% of this total time spent on direct patient counseling and coordination of care.    Chief Complaint:   denied    History of Present Illness:    Maryse Reid is a 29 y.o. female who presents with past medical history of Hashimoto's who presents to the hospital with reported rash on chest and shaking this morning.  Patient presented with a dry mouth with increasing anxiety.  Patient was recently diagnosed with strep infection in March and treated with amoxicillin.  This was subsequently switched to Augmentin.  This subsequently was switched to clindamycin which was reportedly completed on Thursday.  She presented with reported new onset dysarthria and subsequently had a stroke alert activated.  Patient was also to have severe tremors on presentation.  She follows with rheumatology as an outpatient.  She was seen by [facility] in  [facility] in November 2023 initially.  At that particular junction, she was followed with a 1.5-month history of fatigue polyarthralgias with associated stiffness and swelling and intermittent salivary gland swelling with dry eye and dry mouth.  She has longstanding history of Raynaud's phenomena chronic diarrhea gastroesophageal reflux disease and prior family history of lupus with paternal grandmother.  During that evaluation she was seen for a positive TEO and positive centromere antibody in addition to thyroglobulin antibody positivity.  The lip biopsy was performed to rule out Sjogren's back in October 2023 which was reported to be negative  Overall, workup by rheumatology was deemed be nonspecific for specific rheumatological disorder during that prior visit.  She was seen by rheumatology earlier this month on the third with plans for ongoing surveillance of symptoms.  Recent ANCA profile was noted to be negative on April 2.  Throat culture was noted to be negative ordered on March 27.  Lyme antibody testing was negative on April 3.  She presents today with the above symptoms of dysarthria.  A stroke alert was initiated and neurology recommended MRI imaging    Review of Systems:    Review of Systems   HENT:  Negative for congestion and dental problem.    Respiratory:  Negative for shortness of breath.    Genitourinary:  Negative for difficulty urinating.   Neurological:  Negative for dizziness and headaches.   All other systems reviewed and are negative.      Past Medical and Surgical History:     Past Medical History:   Diagnosis Date    Abdominal pain     with nausea    ADHD     Anemia     Anxiety     Chlamydia     Diarrhea     GERD (gastroesophageal reflux disease)     Migraine     Mononucleosis     Ovarian cyst     PONV (postoperative nausea and vomiting)        Past Surgical History:   Procedure Laterality Date    SINUS SURGERY      WISDOM TOOTH EXTRACTION         Meds/Allergies:    Prior to Admission  medications    Medication Sig Start Date End Date Taking? Authorizing Provider   Cyanocobalamin 5000 MCG SUBL Place under the tongue  Patient not taking: Reported on 3/28/2024    Historical Provider, MD   dicyclomine (BENTYL) 10 mg capsule Take 1 capsule (10 mg total) by mouth 3 (three) times a day as needed (abdominal cramping) 11/9/23   YONATHAN Szymanski Bisgly-Vit C-Vit B12-FA 28-60-0.008-0.4 MG CAPS Take by mouth  Patient not taking: Reported on 3/28/2024    Historical Provider, MD   fluticasone (FLONASE) 50 mcg/act nasal spray 1 spray into each nostril daily 3/19/24   ROSA M Isabel   Ginkgo Biloba (GINKOBA PO) Take by mouth  Patient not taking: Reported on 3/28/2024    Historical Provider, MD   Magnesium Gluconate 550 MG TABS Take 400 mg by mouth 2 (two) times a day    Historical Provider, MD   Multiple Vitamin (MULTIVITAMIN ADULT PO) Take by mouth    Historical Provider, MD   Restasis 0.05 % ophthalmic emulsion  9/25/23   Historical Provider, MD   VITAMIN D PO Take by mouth    Historical Provider, MD     I have reviewed home medications with patient personally.    Allergies:   Allergies   Allergen Reactions    Augmentin [Amoxicillin-Pot Clavulanate] Facial Swelling       Social History:     Marital Status: Single   Patient Pre-hospital Living Situation: home  Patient Pre-hospital Level of Mobility: amb  Patient Pre-hospital Diet Restrictions: denied  Substance Use History:   Social History     Substance and Sexual Activity   Alcohol Use Yes    Alcohol/week: 1.0 standard drink of alcohol    Types: 1 Glasses of wine per week    Comment: socially weekly     Social History     Tobacco Use   Smoking Status Never   Smokeless Tobacco Never     Social History     Substance and Sexual Activity   Drug Use Never       Family History:    Family History   Problem Relation Age of Onset    Hypertension Mother     Hypertension Father     No Known Problems Brother     Hypertension Maternal  "Grandmother     Cancer Maternal Grandfather         bladder    Lupus Paternal Grandmother     Diabetes Paternal Grandmother     Alzheimer's disease Paternal Grandfather        Physical Exam:     Vitals:   Blood Pressure: 115/75 (04/08/24 1515)  Pulse: 84 (04/08/24 1541)  Temperature: 99.1 °F (37.3 °C) (04/08/24 1515)  Respirations: 17 (04/08/24 1445)  Height: 5' 5\" (165.1 cm) (04/08/24 1541)  Weight - Scale: 61.3 kg (135 lb 2.3 oz) (04/08/24 1541)  SpO2: 99 % (04/08/24 1541)    Physical Exam  Constitutional:       Appearance: Normal appearance.   Cardiovascular:      Rate and Rhythm: Normal rate and regular rhythm.      Pulses: Normal pulses.      Heart sounds: Normal heart sounds.   Pulmonary:      Effort: Pulmonary effort is normal. No respiratory distress.   Abdominal:      General: Abdomen is flat.      Palpations: Abdomen is soft.   Neurological:      General: No focal deficit present.      Mental Status: She is alert and oriented to person, place, and time.   Psychiatric:         Mood and Affect: Mood normal.             Additional Data:     Lab Results: I have personally reviewed pertinent reports.      Results from last 7 days   Lab Units 04/08/24  1550 04/08/24  1159   WBC Thousand/uL  --  12.76*   HEMOGLOBIN g/dL  --  12.9   HEMATOCRIT %  --  38.3   PLATELETS Thousands/uL 310 325   NEUTROS PCT %  --  85*   LYMPHS PCT %  --  10*   MONOS PCT %  --  4   EOS PCT %  --  0     Results from last 7 days   Lab Units 04/08/24  1159   SODIUM mmol/L 139   POTASSIUM mmol/L 4.1   CHLORIDE mmol/L 106   CO2 mmol/L 23   BUN mg/dL 9   CREATININE mg/dL 0.72   ANION GAP mmol/L 10   CALCIUM mg/dL 9.0   GLUCOSE RANDOM mg/dL 112     Results from last 7 days   Lab Units 04/08/24  1222   INR  1.15     Results from last 7 days   Lab Units 04/08/24  1153   POC GLUCOSE mg/dl 110               Imaging: I have personally reviewed pertinent reports.      CT stroke alert brain   Final Result by Florentin Alberts MD (04/08 1225)    "   No acute intracranial hemorrhage, mass effect or edema.            Workstation performed: [id number]         CTA stroke alert (head/neck)   Final Result by Florentin Alberts MD (04/08 1234)         1. No hemodynamically significant stenosis in the major arteries of the neck.   2. No intracranial aneurysm or major intracranial arterial stenosis or occlusion.               I personally provided preliminary results of this study with AIDA LOAIZA, Jeff Howard and Alex Carmichael on 4/8/2024 12:29 PM.                                 Workstation performed: [id number]         MRI brain w wo contrast    (Results Pending)           ** Please Note: This note has been constructed using a voice recognition system. **

## 2024-04-08 NOTE — TELEPHONE ENCOUNTER
Reason for Disposition  • Patient already left for the hospital/clinic    Answer Assessment - Initial Assessment Questions  Pt called back and she states she went to the ED because it got worse.    Protocols used: No Contact or Duplicate Contact Call-ADULT-[place]     17

## 2024-04-09 ENCOUNTER — APPOINTMENT (OUTPATIENT)
Dept: NEUROLOGY | Facility: CLINIC | Age: 29
End: 2024-04-09
Payer: COMMERCIAL

## 2024-04-09 PROBLEM — R41.4: Status: ACTIVE | Noted: 2024-04-09

## 2024-04-09 LAB
ALBUMIN SERPL BCP-MCNC: 3.8 G/DL (ref 3.5–5)
ALP SERPL-CCNC: 38 U/L (ref 34–104)
ALT SERPL W P-5'-P-CCNC: 11 U/L (ref 7–52)
AMPHETAMINES SERPL QL SCN: NEGATIVE
ANION GAP SERPL CALCULATED.3IONS-SCNC: 9 MMOL/L (ref 4–13)
AST SERPL W P-5'-P-CCNC: 30 U/L (ref 13–39)
BARBITURATES UR QL: NEGATIVE
BASOPHILS # BLD AUTO: 0.02 THOUSANDS/ÂΜL (ref 0–0.1)
BASOPHILS NFR BLD AUTO: 0 % (ref 0–1)
BENZODIAZ UR QL: NEGATIVE
BILIRUB SERPL-MCNC: 0.68 MG/DL (ref 0.2–1)
BUN SERPL-MCNC: 9 MG/DL (ref 5–25)
CALCIUM SERPL-MCNC: 8.3 MG/DL (ref 8.4–10.2)
CHLORIDE SERPL-SCNC: 105 MMOL/L (ref 96–108)
CO2 SERPL-SCNC: 23 MMOL/L (ref 21–32)
COCAINE UR QL: NEGATIVE
CREAT SERPL-MCNC: 0.73 MG/DL (ref 0.6–1.3)
EOSINOPHIL # BLD AUTO: 0.06 THOUSAND/ÂΜL (ref 0–0.61)
EOSINOPHIL NFR BLD AUTO: 1 % (ref 0–6)
ERYTHROCYTE [DISTWIDTH] IN BLOOD BY AUTOMATED COUNT: 12.9 % (ref 11.6–15.1)
FENTANYL UR QL SCN: NEGATIVE
GFR SERPL CREATININE-BSD FRML MDRD: 112 ML/MIN/1.73SQ M
GLUCOSE P FAST SERPL-MCNC: 93 MG/DL (ref 65–99)
GLUCOSE SERPL-MCNC: 93 MG/DL (ref 65–140)
HCT VFR BLD AUTO: 34.4 % (ref 34.8–46.1)
HGB BLD-MCNC: 11.4 G/DL (ref 11.5–15.4)
HYDROCODONE UR QL SCN: NEGATIVE
IMM GRANULOCYTES # BLD AUTO: 0.01 THOUSAND/UL (ref 0–0.2)
IMM GRANULOCYTES NFR BLD AUTO: 0 % (ref 0–2)
LYMPHOCYTES # BLD AUTO: 1.74 THOUSANDS/ÂΜL (ref 0.6–4.47)
LYMPHOCYTES NFR BLD AUTO: 30 % (ref 14–44)
MAGNESIUM SERPL-MCNC: 2 MG/DL (ref 1.9–2.7)
MCH RBC QN AUTO: 29.4 PG (ref 26.8–34.3)
MCHC RBC AUTO-ENTMCNC: 33.1 G/DL (ref 31.4–37.4)
MCV RBC AUTO: 89 FL (ref 82–98)
METHADONE UR QL: NEGATIVE
MONOCYTES # BLD AUTO: 0.5 THOUSAND/ÂΜL (ref 0.17–1.22)
MONOCYTES NFR BLD AUTO: 9 % (ref 4–12)
NEUTROPHILS # BLD AUTO: 3.57 THOUSANDS/ÂΜL (ref 1.85–7.62)
NEUTS SEG NFR BLD AUTO: 60 % (ref 43–75)
NRBC BLD AUTO-RTO: 0 /100 WBCS
OPIATES UR QL SCN: NEGATIVE
OXYCODONE+OXYMORPHONE UR QL SCN: NEGATIVE
PCP UR QL: NEGATIVE
PLATELET # BLD AUTO: 298 THOUSANDS/UL (ref 149–390)
PMV BLD AUTO: 9.7 FL (ref 8.9–12.7)
POTASSIUM SERPL-SCNC: 4.6 MMOL/L (ref 3.5–5.3)
PROT SERPL-MCNC: 6.1 G/DL (ref 6.4–8.4)
RBC # BLD AUTO: 3.88 MILLION/UL (ref 3.81–5.12)
SODIUM SERPL-SCNC: 137 MMOL/L (ref 135–147)
THC UR QL: NEGATIVE
WBC # BLD AUTO: 5.9 THOUSAND/UL (ref 4.31–10.16)

## 2024-04-09 PROCEDURE — 80053 COMPREHEN METABOLIC PANEL: CPT | Performed by: INTERNAL MEDICINE

## 2024-04-09 PROCEDURE — 85025 COMPLETE CBC W/AUTO DIFF WBC: CPT | Performed by: INTERNAL MEDICINE

## 2024-04-09 PROCEDURE — 99238 HOSP IP/OBS DSCHRG MGMT 30/<: CPT | Performed by: PHYSICIAN ASSISTANT

## 2024-04-09 PROCEDURE — 99232 SBSQ HOSP IP/OBS MODERATE 35: CPT | Performed by: INTERNAL MEDICINE

## 2024-04-09 PROCEDURE — 95816 EEG AWAKE AND DROWSY: CPT

## 2024-04-09 PROCEDURE — 83735 ASSAY OF MAGNESIUM: CPT | Performed by: INTERNAL MEDICINE

## 2024-04-09 PROCEDURE — 95816 EEG AWAKE AND DROWSY: CPT | Performed by: PSYCHIATRY & NEUROLOGY

## 2024-04-09 PROCEDURE — 99233 SBSQ HOSP IP/OBS HIGH 50: CPT | Performed by: PSYCHIATRY & NEUROLOGY

## 2024-04-09 RX ORDER — ACETAMINOPHEN 325 MG/1
975 TABLET ORAL ONCE
Status: COMPLETED | OUTPATIENT
Start: 2024-04-09 | End: 2024-04-09

## 2024-04-09 RX ADMIN — ACETAMINOPHEN 975 MG: 325 TABLET, FILM COATED ORAL at 09:37

## 2024-04-09 RX ADMIN — FLUTICASONE PROPIONATE 1 SPRAY: 50 SPRAY, METERED NASAL at 09:38

## 2024-04-09 NOTE — ASSESSMENT & PLAN NOTE
"Appears to be chronic in nature.  Ongoing surveillance with rheumatology, note from 4/3 reviewed, \"At this time, her workup does not reveal a specific rheumatological diagnosis at this time and therefore we will take a monitoring approach.  The low titer TEO could potentially be due to the positive thyroid antibody and the low titer centromere antibody is nonspecific at this time\"  Outpatient follow-up with rheumatology as scheduled in July   "

## 2024-04-09 NOTE — ASSESSMENT & PLAN NOTE
"Appears to be chronic in nature.  Ongoing surveillance with rheumatology, note from 4/3 reviewed, \"At this time, her workup does not reveal a specific rheumatological diagnosis at this time and therefore we will take a monitoring approach.  The low titer TEO could potentially be due to the positive thyroid antibody and the low titer centromere antibody is nonspecific at this time\"  Has f/u in July with rheumatology   "

## 2024-04-09 NOTE — PROGRESS NOTES
"    NEUROLOGY RESIDENCY PROGRESS NOTE   Name: Maryse Reid   Age & Sex: 29 y.o. female   MRN: [id number]  Unit/Bed#: CW2 218-01   Encounter: [id number]    Maryse Reid will need follow up in in 4 weeks with general AP/Attending .  She will not require outpatient neurological testing.   Pending for discharge: Routine EEG  ASSESSMENT & PLAN   #Dysarthria  29-year-old female with a pertinent past medical history of ADHD, anxiety, depression, GERD who presented as a stroke alert on 4/8/2024 at 12:03 PM with initial presenting deficits of dysarthria and bilateral upper extremity tremors.  Initial BP elevated at 160/72, fingerstick blood glucose 110.  Initial NIHSS 1 for mild to moderate dysarthria and a last known well at 8:30 AM on day of presentation.  Initial stroke imaging consisting of CT head and CTA H/N without any intracranial or cerebrovascular pathology.  She was not a TNK candidate secondary for nondisabling symptoms.  She was admitted for MRI brain with and without contrast which was negative.  Neurological exam revealed a staggering dysarthric speech which was noted to wax and wane in both severity and intensity during her stay.  She also complained of a \"disconnect tween her right arm and her brain\" and was noted to have difficulty with fine movements and dexterity that improved with distraction.     Impression: Etiology of patient's symptomatology remains unclear. Her comprehensive cerebrovascular imaging did not reveal any evidence of cerebrovascular pathology to explain her symptoms such as a vascular insult or demyelinating disease process.  Overall her clinical presentation favors that of a diagnosis of functional neurological disorder however patient outside of reporting increased stress and anxiety during this hospital stay denies any recent big life changes or outside stressors.  Will obtain inpatient spot EEG to rule out any increased epileptiform tendencies however low clinical sufficient " "for seizure activity.  If EEG unremarkable patient can discharge and follow-up with general neurology as an outpatient.    Plan: Discussed plan with neurology attending, Dr. Carmichael  Discontinue stroke pathway  No indications for AC/AP at this time  Obtain inpatient routine EEG to r/o epileptiform tendencies - clear for discharge from neuro perspective if unremarkable  Outpatient Neurology following with general neurology within 4-6 weeks  Recommended continuing with her outpatient patient therapist to discuss her hospitalization  Rest of other care per primary team appreciated      SUBJECTIVE   Maryse Reid is a 29 y.o. female who is currently addmited for dysarthria. MRI obtained overnight without any evidence of ischemia or other intracranial pathology.     Patient was seem and examined this morning at bedside. No acute events over night. Patient reports that her speech is still not back to her baseline but notes periods of intermittent improvement throughout night. Reports there is a \"disconnect\" with her RUE and it is not a fluid in     Pertinent Negatives include: numbness, weakness, speech or visual changes, headaches, migraine headaches, syncope, seizures, amaurosis, diplopia, other visual changes, paralysis/weakness, numbness or tingling, tremor, speech impairment     OBJECTIVE   Patient ID: Maryse Reid is a 29 y.o. female.  Vitals:    24 1541 24 0629 24 0746 24 0746   BP:  124/54 93/50 93/50   Pulse: 84 56 59 60   Resp:       Temp:  98 °F (36.7 °C) 98.1 °F (36.7 °C) 98.1 °F (36.7 °C)   SpO2: 99% 100% 99% 98%   Weight: 61.3 kg (135 lb 2.3 oz)      Height: 5' 5\" (1.651 m)           Temperature:   Temp (24hrs), Av.4 °F (36.9 °C), Min:98 °F (36.7 °C), Max:99.3 °F (37.4 °C)    Temperature: 98.1 °F (36.7 °C)    GENERAL EXAM:  Constitutional:Alert. Not in acute distress. Not ill-appearing, toxic-appearing or diaphoretic.   HENT: Normocephalic and atraumatic. Nose and Ears normal. "   Eyes: No scleral icterus. No discharge.   Neck: Neck Supple. ROM normal.  Cardiovascular: Tachydardiac. Distal extremities warm without palpable edema or tenderness, no observed significant swelling.   Pulmonary: Pulmonary effort is normal. Not in respiratory distress  Abdominal: Abdomen is flat and not distended  Musculoskeletal: No swelling or deformity.  Skin: Warm and dry  Psychiatric: Anxious appearing    NEUROLOGIC  EXAM:  Mental Status: alertness: alert, orientation: time, date, person, place, city, president, speech:fluent, affect:  anxious , thought content exhibits logical connections  Cranial Nerves:  II: Pupils equal, round, reactive to light and accommodation, Visual Fields normal  III, IV, VI: EOM full and intact  V: facial sensation was normal and symmetrical  VII: Facial symmetry:facial symmetry equal  VIII: normal hearing to speech  IX, X: normal palatal elevation, no uvular deviation  XI: 5/5 head turn and 5/5 shoulder shrug bilaterally  XII: midline tongue protrusion  Motor: Normal bulk, tone, no involuntary movements or tremors     DELTOID   BICEP   TRICEPS   WRIST  EXTENSION   WRIST  FLEXION   DORSAL  INTEROSSEI      RIGHT 5 5 5 5 5 5 5   LEFT 5 5 5 5 5 5 5      HIP  FLEXION KNEE  EXTENSION DORSI   PLANTAR     RIGHT 5 5 5 5   LEFT 5 5 5 5   Reflexes: No clonus, no Centeno's, no cross abductors, toes down     BICEP   TRICEPS   BRACHIO   PATELLAR   ACHILLES   RIGHT 2+ 2+ 2+ 2+ 2+   LEFT 2+ 2+ 2+ 2+ 2+   Sensory:Normal sensation to light touch, pinprick, vibration, temperature, and proprioception in all limbs, romberg negative  Coordination: Difficulty with fine motor movements in the upper extremities R>L. Cerebellar arm drift absent, Finger-to-nose bilaterally intact, Heel To Amado normal bilaterally  Station/Gait: normal gait and station  LABORATORY DATA   Labs: I have personally reviewed pertinent reports.    Results from last 7 days   Lab Units 04/09/24  0627 04/08/24  1550 04/08/24  3862    WBC Thousand/uL 5.90  --  12.76*   HEMOGLOBIN g/dL 11.4*  --  12.9   HEMATOCRIT % 34.4*  --  38.3   PLATELETS Thousands/uL 298 310 325   NEUTROS PCT % 60  --  85*   MONOS PCT % 9  --  4   EOS PCT % 1  --  0      Results from last 7 days   Lab Units 04/09/24  0627 04/08/24  1159   SODIUM mmol/L 137 139   POTASSIUM mmol/L 4.6 4.1   CHLORIDE mmol/L 105 106   CO2 mmol/L 23 23   BUN mg/dL 9 9   CREATININE mg/dL 0.73 0.72   CALCIUM mg/dL 8.3* 9.0   ALK PHOS U/L 38  --    ALT U/L 11  --    AST U/L 30  --      Results from last 7 days   Lab Units 04/09/24  0627   MAGNESIUM mg/dL 2.0          Results from last 7 days   Lab Units 04/08/24  1222   INR  1.15   PTT seconds 27             IMAGING & DIAGNOSTIC TESTING   Radiology Results: I have personally reviewed pertinent reports.      MRI brain w wo contrast   Final Result by E. Alec Schoenberger, MD (04/08 2121)      No intracranial pathology.      Workstation performed: [id number]         CT stroke alert brain   Final Result by Florentin Alberts MD (04/08 1225)      No acute intracranial hemorrhage, mass effect or edema.            Workstation performed: [id number]         CTA stroke alert (head/neck)   Final Result by Florentin Alberts MD (04/08 1234)         1. No hemodynamically significant stenosis in the major arteries of the neck.   2. No intracranial aneurysm or major intracranial arterial stenosis or occlusion.               I personally provided preliminary results of this study with AIDA LOAIZA, Jeff Howard and Alex Carmichael on 4/8/2024 12:29 PM.                                 Workstation performed: [id number]             Other Diagnostic Testing: I have personally reviewed pertinent reports.    ACTIVE MEDICATIONS     Current Facility-Administered Medications   Medication Dose Route Frequency    dicyclomine (BENTYL) capsule 10 mg  10 mg Oral TID PRN    enoxaparin (LOVENOX) subcutaneous injection 40 mg  40 mg Subcutaneous Daily    fluticasone (FLONASE)  50 mcg/act nasal spray 1 spray  1 spray Nasal Daily       Prior to Admission medications    Medication Sig Start Date End Date Taking? Authorizing Provider   fluticasone (FLONASE) 50 mcg/act nasal spray 1 spray into each nostril daily 3/19/24  Yes ROSA M Isabel   Magnesium Gluconate 550 MG TABS Take 400 mg by mouth 2 (two) times a day   Yes Historical Provider, MD   Multiple Vitamin (MULTIVITAMIN ADULT PO) Take by mouth   Yes Historical Provider, MD   NON FORMULARY Citlaly Villegas   Yes Historical Provider, MD   VITAMIN D PO Take by mouth   Yes Historical Provider, MD   Cyanocobalamin 5000 MCG SUBL Place under the tongue  Patient not taking: Reported on 3/28/2024    Historical Provider, MD   dicyclomine (BENTYL) 10 mg capsule Take 1 capsule (10 mg total) by mouth 3 (three) times a day as needed (abdominal cramping) 11/9/23   Lam Calderon PA-C   Fe Bisgly-Vit C-Vit B12-FA 28-60-0.008-0.4 MG CAPS Take by mouth  Patient not taking: Reported on 3/28/2024    Historical Provider, MD   Ginkgo Biloba (GINKOBA PO) Take by mouth  Patient not taking: Reported on 3/28/2024    Historical Provider, MD   Restasis 0.05 % ophthalmic emulsion  9/25/23   Historical Provider, MD         VTE Pharmacologic Prophylaxis:  Per primary team  VTE Mechanical Prophylaxis: sequential compression device    ==  Ashish Henderson DO  [facility], PGY-2

## 2024-04-09 NOTE — UTILIZATION REVIEW
Initial Clinical Review    Admission: Date/Time/Statement:   Admission Orders (From admission, onward)       Ordered        04/08/24 1345  Place in Observation  Once                          Orders Placed This Encounter   Procedures    Place in Observation     Standing Status:   Standing     Number of Occurrences:   1     Order Specific Question:   Level of Care     Answer:   Med Surg [16]     ED Arrival Information       Expected   -    Arrival   4/8/2024 09:49    Acuity   Emergent              Means of arrival   Walk-In    Escorted by   Self    Service   Hospitalist    Admission type   Emergency              Arrival complaint   Rash shakey             Chief Complaint   Patient presents with    Rash     Pt co/ rash on chest and shakiness this morning. Finished clindamycin for strep on Thursday. Also reports dry mouth and anxiety denies tightness in throat       Initial Presentation: 29 y.o. female to ED presents for rash on chest and shaking this am. New onset dysarthria and Stroke alert. Severe tremors, follows with Rheumatology outpt. Notes dry mouth and increasing anxiety. Pt was recently diagnosed with strep infection in March and treated with amoxicillin. This was subsequently switched to Augmentin. This subsequently was switched to clindamycin which was reportedly completed on Thursday. [facility] in November 2023 initially.  At that particular junction, she was followed with a 1.5-month history of fatigue polyarthralgias with associated stiffness and swelling and intermittent salivary gland swelling with dry eye and dry mouth.  She has longstanding history of Raynaud's phenomena chronic diarrhea gastroesophageal reflux disease and prior family history of lupus with paternal grandmother.  During that evaluation she was seen for a positive TEO and positive centromere antibody in addition to thyroglobulin antibody positivity.  The lip biopsy was performed to rule out  Sjogren's back in October 2023 which was reported to be negative  Overall, workup by rheumatology was deemed be nonspecific for specific rheumatological disorder during that prior visit.  She was seen by rheumatology earlier this month on the third with plans for ongoing surveillance of symptoms.  Recent ANCA profile was noted to be negative on April 2.  Throat culture was noted to be negative ordered on March 27.  Lyme antibody testing was negative on April 3.  PMH for Hashimoto's. GERD.   Admit to Observation level of care for Dysarthria. Fatigue, chronic in nature. Stroke Alert. Neurology consult. MRI. Continue Bentyl. Add PPI.     4/8  Neurology cons; Dysarthria.  stroke alert on 4/8/24 at 12:03 AM for evaluation of dysarthria and upper extremity tremors. Initial NIHSS 1 (mild-mod dysarthria) and LKW 8:30 AM. Initial BP Blood Pressure: 160/72, FSBG POC Glucose: 110.. CTH revealed no hemorrhage or acute intracranial pathology and CTA H/N showed no LVO or high grade stenosis. As a result of Symptoms resolved/clearly non disabling patient was not determined to be a candidate for.    4/9  Progress notes; UA mildly abnormal, culture pending.   Pt speech is still occasionally dysarthric and feels like her R hand is not attached to her body. She has a mild headache.     Per Neurology; Symptoms improving, MRI brain negative, d/c ASA. Routine EEG pending.      ED Triage Vitals   Temperature Pulse Respirations Blood Pressure SpO2   04/08/24 0953 04/08/24 0953 04/08/24 0953 04/08/24 0953 04/08/24 0953   98 °F (36.7 °C) (!) 116 (!) 26 160/72 100 %      Temp src Heart Rate Source Patient Position - Orthostatic VS BP Location FiO2 (%)   -- 04/08/24 1205 04/08/24 1205 -- --    Monitor Lying        Pain Score       04/08/24 1345       4          Wt Readings from Last 1 Encounters:   04/08/24 61.3 kg (135 lb 2.3 oz)     Additional Vital Signs:   4/09/24 07:46:32 98.1 °F (36.7 °C) 60 -- 93/50 64 Abnormal  98 % -- --   04/09/24  07:46:08 98.1 °F (36.7 °C) 59 -- 93/50 64 Abnormal  99 % -- --   04/09/24 06:29:37 98 °F (36.7 °C) 56 -- 124/54 77 100 % -- --   04/08/24 2100 -- -- -- -- -- -- None (Room air) --   04/08/24 1541 -- 84 -- -- -- 99 % -- --   04/08/24 15:15:09 99.1 °F (37.3 °C) 79 -- 115/75 88 98 % -- --   04/08/24 1445 99.3 °F (37.4 °C) 65 17 129/61 88 100 % -- --   04/08/24 1430 -- 84 20 129/66 92 100 % -- --   04/08/24 1400 -- 84 20 121/61 -- 100 % -- --   04/08/24 1345 -- 84 22 125/60 -- 99 % None (Room air)      Pertinent Labs/Diagnostic Test Results:   MRI brain w wo contrast   Final Result by E. Alec Schoenberger, MD (04/08 2121)      No intracranial pathology.      Workstation performed: [id number]         CT stroke alert brain   Final Result by Florentin Alberts MD (04/08 1225)      No acute intracranial hemorrhage, mass effect or edema.            Workstation performed: [id number]         CTA stroke alert (head/neck)   Final Result by Florentin Alberts MD (04/08 1234)         1. No hemodynamically significant stenosis in the major arteries of the neck.   2. No intracranial aneurysm or major intracranial arterial stenosis or occlusion.               I personally provided preliminary results of this study with AIDA LOAIZA, Jeff Howard and Alex Carmichael on 4/8/2024 12:29 PM.                                 Workstation performed: [id number]           Results from last 7 days   Lab Units 04/08/24  1222   SARS-COV-2  Negative     Results from last 7 days   Lab Units 04/09/24  0627 04/08/24  1550 04/08/24  1159   WBC Thousand/uL 5.90  --  12.76*   HEMOGLOBIN g/dL 11.4*  --  12.9   HEMATOCRIT % 34.4*  --  38.3   PLATELETS Thousands/uL 298 310 325   NEUTROS ABS Thousands/µL 3.57  --  10.83*         Results from last 7 days   Lab Units 04/09/24  0627 04/08/24  1159   SODIUM mmol/L 137 139   POTASSIUM mmol/L 4.6 4.1   CHLORIDE mmol/L 105 106   CO2 mmol/L 23 23   ANION GAP mmol/L 9 10   BUN mg/dL 9 9   CREATININE mg/dL 0.73  0.72   EGFR ml/min/1.73sq m 112 114   CALCIUM mg/dL 8.3* 9.0   MAGNESIUM mg/dL 2.0  --      Results from last 7 days   Lab Units 04/09/24  0627   AST U/L 30   ALT U/L 11   ALK PHOS U/L 38   TOTAL PROTEIN g/dL 6.1*   ALBUMIN g/dL 3.8   TOTAL BILIRUBIN mg/dL 0.68     Results from last 7 days   Lab Units 04/08/24  1153   POC GLUCOSE mg/dl 110     Results from last 7 days   Lab Units 04/09/24  0627 04/08/24  1159   GLUCOSE RANDOM mg/dL 93 112       Results from last 7 days   Lab Units 04/08/24  1550 04/08/24  1356 04/08/24  1222   HS TNI 0HR ng/L  --   --  <2   HS TNI 2HR ng/L <2 <2  --          Results from last 7 days   Lab Units 04/08/24  1222   PROTIME seconds 14.6*   INR  1.15   PTT seconds 27     Results from last 7 days   Lab Units 04/08/24  1159   TSH 3RD GENERATON uIU/mL 5.065*       Results from last 7 days   Lab Units 04/08/24  1203   CLARITY UA  Slightly Cloudy   COLOR UA  Yellow   SPEC GRAV UA  1.015   PH UA  7.0   GLUCOSE UA mg/dl Negative   KETONES UA mg/dl Negative   BLOOD UA  Trace*   PROTEIN UA mg/dl Negative   NITRITE UA  Negative   BILIRUBIN UA  Negative   UROBILINOGEN UA E.U./dl 0.2   LEUKOCYTES UA  Small*   WBC UA /hpf Innumerable*   RBC UA /hpf None Seen   BACTERIA UA /hpf Moderate*   EPITHELIAL CELLS WET PREP /hpf Occasional   MUCUS THREADS  Occasional*     Results from last 7 days   Lab Units 04/08/24  1222   INFLUENZA A PCR  Negative   INFLUENZA B PCR  Negative   RSV PCR  Negative       ED Treatment:   Medication Administration from 04/08/2024 0949 to 04/08/2024 1508         Date/Time Order Dose Route Action     04/08/2024 1222 EDT sodium chloride 0.9 % bolus 1,000 mL 1,000 mL Intravenous New Bag     04/08/2024 1214 EDT iohexol (OMNIPAQUE) 350 MG/ML injection (MULTI-DOSE) 85 mL 85 mL Intravenous Given     04/08/2024 1406 EDT acetaminophen (TYLENOL) tablet 975 mg 975 mg Oral Given          Past Medical History:   Diagnosis Date    Abdominal pain     with nausea    ADHD     Anemia     Anxiety      Chlamydia     Diarrhea     GERD (gastroesophageal reflux disease)     Migraine     Mononucleosis     Ovarian cyst     PONV (postoperative nausea and vomiting)      Present on Admission:   GERD (gastroesophageal reflux disease)   Fatigue      Admitting Diagnosis: Rash [R21]  Dysarthria [R47.1]  Age/Sex: 29 y.o. female    Admission Orders:  Scheduled Medications:  enoxaparin, 40 mg, Subcutaneous, Daily  fluticasone, 1 spray, Nasal, Daily      Continuous IV Infusions: None     PRN Meds:  dicyclomine, 10 mg, Oral, TID PRN        IP CONSULT TO NEUROLOGY    Network Utilization Review Department  ATTENTION: Please call with any questions or concerns to [phone] and carefully listen to the prompts so that you are directed to the right person. All voicemails are confidential.   For Discharge needs, contact Care Management DC Support Team at [phone] opt. [phone]  Send all requests for admission clinical reviews, approved or denied determinations and any other requests to dedicated fax number below belonging to the campus where the patient is receiving treatment. List of dedicated fax numbers for the Facilities:  FACILITY NAME UR FAX NUMBER   ADMISSION DENIALS (Administrative/Medical Necessity) [phone]   DISCHARGE SUPPORT TEAM (NETWORK) [phone]   PARENT CHILD HEALTH (Maternity/NICU/Pediatrics) [phone]   [facility] [phone]   [facility] [phone]   [facility] [phone]   [facility] [phone]   [facility] [phone]   [facility] [phone]   [facility] [phone]   [facility] [phone]   [facility] [phone]   [facility] [phone]   [facility] [phone]   [facility]  [facility] [phone]

## 2024-04-09 NOTE — DISCHARGE SUMMARY
"[facility]  Discharge- Maryse Arnettr 1995, 29 y.o. female MRN: [id number]  Unit/Bed#: CW2 218-01 Encounter: [id number]  Primary Care Provider: ROSA M Villarreal   Date and time admitted to hospital: 4/8/2024  9:53 AM    * Dysarthria  Assessment & Plan  Presented with dysarthria and bilateral upper extremity tremors. Admitted under stroke pathway.  Neuroimaging negative including CT Head, CTA H/N, MRI Brain, EEG  UA mildly abnormal but culture with no growth and patient without any urinary symptoms   Laboratory studies otherwise unremarkable and VSS  Neurology consult appreciated   Suspecting possibly functional etiology, patient encouraged to follow-up with her therapist and PCP for management of underlying stress/anxiety   Outpatient follow-up in 4-6 weeks     Alien hand syndrome-resolved as of 4/10/2024  Assessment & Plan  Patient initially reported not being able to control her R hand. Now resolved but with reports of sharp/shooting pain in her right arm and right leg.   Neuro w/u unremarkable as above   Patient endorses mild headache x 3 weeks but otherwise no back or neck pain  Trial Rx for gabapentin on discharge for any possible neuropathic symptoms    Fatigue  Assessment & Plan  Appears to be chronic in nature.  Ongoing surveillance with rheumatology, note from 4/3 reviewed, \"At this time, her workup does not reveal a specific rheumatological diagnosis at this time and therefore we will take a monitoring approach.  The low titer TEO could potentially be due to the positive thyroid antibody and the low titer centromere antibody is nonspecific at this time\"  Outpatient follow-up with rheumatology as scheduled in July     GERD (gastroesophageal reflux disease)  Assessment & Plan  Currently on Bentyl for chronic GI symptoms.        Medical Problems       Resolved Problems  Date Reviewed: 4/10/2024            Resolved    Alien hand syndrome 4/10/2024     " Resolved by  Luz Bai PA-C        Discharging Physician / Practitioner: 4/10/24  PCP: ROSA M Villarreal  Admission Date:   Admission Orders (From admission, onward)       Ordered        04/08/24 1345  Place in Observation  Once                          Discharge Date: 4/10/24    Consultations During Hospital Stay:  Neurology    Procedures Performed:   EEG  MRI Brain  CT stroke alert  CTA stroke alert head/neck  Flu/COVID/RSV: negative  Urine cx with mixed contaminant     Significant Findings / Test Results:   Outlined above     Incidental Findings:   none       Test Results Pending at Discharge (will require follow up):   None      Outpatient Tests Requested:  F/u neurology  F/u PCP    Complications:  none    Reason for Admission: Dysarthria, tremors    Hospital Course:   Maryse Reid is a 29 y.o. female patient with a pertinent PMhx of migraines, ADHD, anxiety, chronic fatigue (following with rheumatology) and recent strep throat who originally presented to the hospital on 4/8/2024 due to dysarthria nad b/l UE tremors. She presented as a stroke alert and was seen in consultation by neurology. BP mildly elvated upon arrival NIHSS was 1. All neuroimaging negative including MRI Brain. EEG was normal. No evidence of demyelinating disease. Unclear etiology for symptoms. Possibly functional and patient was advised to follow-up with her therapist and PCP for management of her anxiety/depression. Due to persistent complaints of neuropathic pain in RUE and RLE patient was provided 2-week Rx for gabapentin 100 mg TID and advised to follow-up with her PCP for further management. Recommend outpatient f/u in 4 weeks with general neurology     Please see above list of diagnoses and related plan for additional information.     Condition at Discharge: stable    Discharge Day Visit / Exam:   Subjective:  feels well. Still intermittent dysarthria noted   Vitals: Blood Pressure: (!) 103/47 (04/10/24 0723)  Pulse: 59  "(04/10/24 0723)  Temperature: 98.5 °F (36.9 °C) (04/10/24 0723)  Respirations: 17 (04/08/24 1445)  Height: 5' 5\" (165.1 cm) (04/08/24 1541)  Weight - Scale: 61.3 kg (135 lb 2.3 oz) (04/08/24 1541)  SpO2: 97 % (04/10/24 0723)  Exam:   Physical Exam  Vitals and nursing note reviewed.   Constitutional:       General: She is not in acute distress.  Cardiovascular:      Rate and Rhythm: Normal rate.   Pulmonary:      Effort: Pulmonary effort is normal.   Skin:     General: Skin is warm and dry.      Coloration: Skin is not pale.      Findings: No erythema.   Neurological:      General: No focal deficit present.      Mental Status: She is alert and oriented to person, place, and time. Mental status is at baseline.   Psychiatric:         Mood and Affect: Mood is anxious. Affect is tearful.          Discussion with Family: Patient declined call to .     Discharge instructions/Information to patient and family:   See after visit summary for information provided to patient and family.      Provisions for Follow-Up Care:  See after visit summary for information related to follow-up care and any pertinent home health orders.      Mobility at time of Discharge:   Basic Mobility Inpatient Raw Score: 24  JH-HLM Goal: 8: Walk 250 feet or more  JH-HLM Achieved: 8: Walk 250 feet ot more  HLM Goal achieved. Continue to encourage appropriate mobility.     Disposition:   Home    Planned Readmission: no     Discharge Statement:  I spent 34 minutes discharging the patient. This time was spent on the day of discharge. I had direct contact with the patient on the day of discharge. Greater than 50% of the total time was spent examining patient, answering all patient questions, arranging and discussing plan of care with patient as well as directly providing post-discharge instructions.  Additional time then spent on discharge activities.    Discharge Medications:  See after visit summary for reconciled discharge medications " provided to patient and/or family.      **Please Note: This note may have been constructed using a voice recognition system**

## 2024-04-09 NOTE — QUICK NOTE
MRI brain w/wo contrast, 4/8/24, demonstrated no acute intracranial pathology.  Stroke pathway discontinued, including ASA.

## 2024-04-09 NOTE — ASSESSMENT & PLAN NOTE
Presented with dysarthria and bilateral upper extremity tremors. Admitted under stroke pathway.  Neuroimaging negative including CT Head, CTA H/N, MRI Brain, EEG  UA mildly abnormal but culture with no growth and patient without any urinary symptoms   Laboratory studies otherwise unremarkable and VSS  Neurology consult appreciated   Suspecting possibly functional etiology, patient encouraged to follow-up with her therapist and PCP for management of underlying stress/anxiety   Outpatient follow-up in 4-6 weeks

## 2024-04-09 NOTE — ASSESSMENT & PLAN NOTE
Patient initially reported not being able to control her R hand. Now resolved but with reports of sharp/shooting pain in her right arm and right leg.   Neuro w/u unremarkable as above   Patient endorses mild headache x 3 weeks but otherwise no back or neck pain  Trial Rx for gabapentin on discharge for any possible neuropathic symptoms

## 2024-04-09 NOTE — ASSESSMENT & PLAN NOTE
Presented with dysarthria and bilateral upper extremity tremors. Admitted under stroke pathway  Neuroimaging negative including CT Head, CTA H/N, MRI Brain  Labs and VS unremarkable  UA mildly abnormal, culture pending however without any urinary symptoms   Neurology consult appreciated   Suspecting possibly functional in nature however checking EEG inpatient   Will arrange for OP f/u

## 2024-04-09 NOTE — SPEECH THERAPY NOTE
Speech/Language Pathology Progress Note    Patient Name: Maryse Reid  Today's Date: 4/9/2024     Consult received and chart reviewed. Per chart review and discussion with pt and RN, pt passed RN dysphagia assessment and is tolerating regular diet with thin liquids with no s/s of aspiration. Speech is mildly dysfluent however pt reported this is improving. MRI brain unremarkable. Neuro is following and will re consult if needed. Formal speech/swallow evaluation does not appear to be indicated at this time. If new concerns arise, please reconsult. Thank you.

## 2024-04-09 NOTE — PROGRESS NOTES
"[facility]  Progress Note  Name: Maryse Reid I  MRN: [id number]  Unit/Bed#: CW2 218-01 I Date of Admission: 4/8/2024   Date of Service: 4/9/2024 I Hospital Day: 0    Assessment/Plan   * Dysarthria  Assessment & Plan  Presented with dysarthria and bilateral upper extremity tremors. Admitted under stroke pathway  Neuroimaging negative including CT Head, CTA H/N, MRI Brain  Labs and VS unremarkable  UA mildly abnormal, culture pending however without any urinary symptoms   Neurology consult appreciated   Suspecting possibly functional in nature however checking EEG inpatient   Will arrange for OP f/u     Alien hand syndrome  Assessment & Plan  Noted on RUE, neuro w/u unremarkable  Appreciate neurology w/u     Fatigue  Assessment & Plan  Appears to be chronic in nature.  Ongoing surveillance with rheumatology, note from 4/3 reviewed, \"At this time, her workup does not reveal a specific rheumatological diagnosis at this time and therefore we will take a monitoring approach.  The low titer TEO could potentially be due to the positive thyroid antibody and the low titer centromere antibody is nonspecific at this time\"  Has f/u in July with rheumatology     GERD (gastroesophageal reflux disease)  Assessment & Plan  Currently on Bentyl for chronic GI symptoms.               VTE Pharmacologic Prophylaxis:   Moderate Risk (Score 3-4) - Pharmacological DVT Prophylaxis Ordered: enoxaparin (Lovenox).    Mobility:   Basic Mobility Inpatient Raw Score: 24  JH-HLM Goal: 8: Walk 250 feet or more  JH-HLM Achieved: 8: Walk 250 feet ot more  JH-HLM Goal achieved. Continue to encourage appropriate mobility.    Patient Centered Rounds: I performed bedside rounds with nursing staff today.   Discussions with Specialists or Other Care Team Provider: d/w neurology and speech     Education and Discussions with Family / Patient: Patient declined call to .     Total Time Spent on Date of " Encounter in care of patient: 34 mins. This time was spent on one or more of the following: performing physical exam; counseling and coordination of care; obtaining or reviewing history; documenting in the medical record; reviewing/ordering tests, medications or procedures; communicating with other healthcare professionals and discussing with patient's family/caregivers.    Current Length of Stay: 0 day(s)  Current Patient Status: Observation   Certification Statement: The patient, admitted on an observation basis, will now require > 2 midnight hospital stay due to pending EEG. Does not meet inpatient criteria   Discharge Plan: Anticipate discharge later today or tomorrow to home.    Code Status: Level 1 - Full Code    Subjective:   Pt seen and states her speech is still occasionally dysarthric and feels like her R hand is not attached to her body. She has a mild headache. Denies increased stress in her life.     Objective:     Vitals:   Temp (24hrs), Av.5 °F (36.9 °C), Min:98 °F (36.7 °C), Max:99.3 °F (37.4 °C)    Temp:  [98 °F (36.7 °C)-99.3 °F (37.4 °C)] 98.1 °F (36.7 °C)  HR:  [56-84] 60  Resp:  [17] 17  BP: ()/(50-75) 93/50  SpO2:  [98 %-100 %] 98 %  Body mass index is 22.49 kg/m².     Input and Output Summary (last 24 hours):     Intake/Output Summary (Last 24 hours) at 2024 1433  Last data filed at 2024 1238  Gross per 24 hour   Intake 440 ml   Output 300 ml   Net 140 ml       Physical Exam:   Physical Exam  Vitals and nursing note reviewed.   Constitutional:       General: She is not in acute distress.  Cardiovascular:      Rate and Rhythm: Normal rate and regular rhythm.   Pulmonary:      Effort: No respiratory distress.   Abdominal:      General: There is no distension.      Palpations: Abdomen is soft.   Musculoskeletal:      Right lower leg: No edema.      Left lower leg: No edema.   Skin:     Coloration: Skin is pale.   Neurological:      Mental Status: She is alert and oriented to  person, place, and time.      Comments: Speech intermittently dysarthric, able to name objects. No obvious tremors on my exam           Additional Data:     Labs:  Results from last 7 days   Lab Units 04/09/24  0627   WBC Thousand/uL 5.90   HEMOGLOBIN g/dL 11.4*   HEMATOCRIT % 34.4*   PLATELETS Thousands/uL 298   SEGS PCT % 60   LYMPHO PCT % 30   MONO PCT % 9   EOS PCT % 1     Results from last 7 days   Lab Units 04/09/24  0627   SODIUM mmol/L 137   POTASSIUM mmol/L 4.6   CHLORIDE mmol/L 105   CO2 mmol/L 23   BUN mg/dL 9   CREATININE mg/dL 0.73   ANION GAP mmol/L 9   CALCIUM mg/dL 8.3*   ALBUMIN g/dL 3.8   TOTAL BILIRUBIN mg/dL 0.68   ALK PHOS U/L 38   ALT U/L 11   AST U/L 30   GLUCOSE RANDOM mg/dL 93     Results from last 7 days   Lab Units 04/08/24  1222   INR  1.15     Results from last 7 days   Lab Units 04/08/24  1153   POC GLUCOSE mg/dl 110               Lines/Drains:  Invasive Devices       Peripheral Intravenous Line  Duration             Peripheral IV 04/08/24 Right Antecubital 1 day                          Imaging: Reviewed radiology reports from this admission including: all     Recent Cultures (last 7 days):   Results from last 7 days   Lab Units 04/08/24  1203   URINE CULTURE  Culture too young- will reincubate       Last 24 Hours Medication List:   Current Facility-Administered Medications   Medication Dose Route Frequency Provider Last Rate    dicyclomine  10 mg Oral TID PRN Hetul Leggett, DO      enoxaparin  40 mg Subcutaneous Daily Hetul Leggett, DO      fluticasone  1 spray Nasal Daily Hetul Leggett, DO          Today, Patient Was Seen By: Jessika Anand PA-C    **Please Note: This note may have been constructed using a voice recognition system.**

## 2024-04-09 NOTE — PLAN OF CARE
Problem: SAFETY ADULT  Goal: Patient will remain free of falls  Description: INTERVENTIONS:  - Educate patient/family on patient safety including physical limitations  - Instruct patient to call for assistance with activity   - Consult OT/PT to assist with strengthening/mobility   - Keep Call bell within reach  - Keep bed low and locked with side rails adjusted as appropriate  - Keep care items and personal belongings within reach  - Initiate and maintain comfort rounds  - Make Fall Risk Sign visible to staff  - Apply yellow socks and bracelet for high fall risk patients  - Consider moving patient to room near nurses station  Outcome: Progressing     Problem: NEUROSENSORY - ADULT  Goal: Achieves stable or improved neurological status  Description: INTERVENTIONS  - Monitor and report changes in neurological status  - Monitor vital signs such as temperature, blood pressure, glucose, and any other labs ordered   - Initiate measures to prevent increased intracranial pressure  - Monitor for seizure activity and implement precautions if appropriate      Outcome: Progressing  Goal: Achieves maximal functionality and self care  Description: INTERVENTIONS  - Monitor swallowing and airway patency with patient fatigue and changes in neurological status  - Encourage and assist patient to increase activity and self care.   - Encourage visually impaired, hearing impaired and aphasic patients to use assistive/communication devices  Outcome: Progressing

## 2024-04-10 ENCOUNTER — TRANSITIONAL CARE MANAGEMENT (OUTPATIENT)
Dept: FAMILY MEDICINE CLINIC | Facility: MEDICAL CENTER | Age: 29
End: 2024-04-10

## 2024-04-10 VITALS
OXYGEN SATURATION: 97 % | SYSTOLIC BLOOD PRESSURE: 103 MMHG | BODY MASS INDEX: 22.52 KG/M2 | HEIGHT: 65 IN | RESPIRATION RATE: 17 BRPM | TEMPERATURE: 98.5 F | WEIGHT: 135.14 LBS | HEART RATE: 59 BPM | DIASTOLIC BLOOD PRESSURE: 47 MMHG

## 2024-04-10 PROBLEM — R41.4: Status: RESOLVED | Noted: 2024-04-09 | Resolved: 2024-04-10

## 2024-04-10 LAB — BACTERIA UR CULT: NORMAL

## 2024-04-10 RX ORDER — GABAPENTIN 100 MG/1
100 CAPSULE ORAL 3 TIMES DAILY
Qty: 42 CAPSULE | Refills: 0 | Status: SHIPPED | OUTPATIENT
Start: 2024-04-10 | End: 2024-04-24

## 2024-04-10 RX ADMIN — FLUTICASONE PROPIONATE 1 SPRAY: 50 SPRAY, METERED NASAL at 09:11

## 2024-04-10 NOTE — CASE MANAGEMENT
Case Management Discharge Planning Note    Patient name Maryse Reid  Location 2 218/2 218-01 MRN [id number]  : 1995 Date 4/10/2024       Current Admission Date: 2024  Current Admission Diagnosis:Dysarthria   Patient Active Problem List    Diagnosis Date Noted    Dysarthria 2024    PONV (postoperative nausea and vomiting) 2023    Migraine 2023    GERD (gastroesophageal reflux disease) 2023    Anxiety 2023    ADHD 2023    PCOS (polycystic ovarian syndrome) 2023    Xerostomia 10/31/2023    Dysmenorrhea 2023    Iron deficiency anemia 2022    Fatigue 2022      LOS (days): 0  Geometric Mean LOS (GMLOS) (days):   Days to GMLOS:     OBJECTIVE:            Current admission status: Observation   Preferred Pharmacy:   [id number]  [place] - [address] [place] [id number]  Phone: [phone] Fax: [phone]    Primary Care Provider: ROSA M Villarreal    Primary Insurance: [employer] [facility]  Secondary Insurance:     DISCHARGE DETAILS:    CM notified by provider that patient was being medically cleared and needed ride. Transportation waiver signed by patient and lyft was requested.

## 2024-04-10 NOTE — ED ATTENDING ATTESTATION
4/8/2024  I, Carlyle Lewis MD, saw and evaluated the patient. I have discussed the patient with the resident/non-physician practitioner and agree with the resident's/non-physician practitioner's findings, Plan of Care, and MDM as documented in the resident's/non-physician practitioner's note, except where noted. All available labs and Radiology studies were reviewed.  I was present for key portions of any procedure(s) performed by the resident/non-physician practitioner and I was immediately available to provide assistance.       At this point I agree with the current assessment done in the Emergency Department.  I have conducted an independent evaluation of this patient a history and physical is as follows:    ED Course       29-year-old female presenting with rash tremors and dysarthria.  Patient recently diagnosed with strep throat was started on 2 antibiotics for same throughout the red nonraised blanchable skin rash on chest and extremities shortly thereafter.  Today approximately at 9 AM patient noted to be tremulous at work with dysarthric speech.  No focal motor weakness noted.  Also of note patient currently being worked up by rheumatology for possible autoimmune disorder..  Given symptoms patient was immediately made a stroke alert upon evaluation.  Patient was taken to CAT scan for CT CTA as per protocol seen evaluated by neurology and ED and NIH stroke scale of 1    Case was discussed with neurology at bedside patient not a TNKase candidate due to mild symptoms.  Recommends inpatient admission for MRI for further evaluation of her dysarthria.    Impression: Dysarthria tremor  Differential diagnosis: Strokelike symptom ICH, brain mass, autoimmune process, MS    Case signed out to [facility] per neurology's recommendations    Critical Care Time  Procedures       Yes - the patient is able to be screened

## 2024-04-10 NOTE — UTILIZATION REVIEW
"Continued Stay Review    SEE INITIAL REVIEW AT BOTTOM    Date: 4/10/24                          Current Patient Class: observation  Current Level of Care: med surg    HPI:29 y.o. female initially admitted on 4/8/24     Assessment/Plan:      Vital Signs:      Pertinent Labs/Diagnostic Results:   Results from last 7 days   Lab Units 04/08/24  1222   SARS-COV-2  Negative     Results from last 7 days   Lab Units 04/09/24  0627 04/08/24  1550 04/08/24  1159   WBC Thousand/uL 5.90  --  12.76*   HEMOGLOBIN g/dL 11.4*  --  12.9   HEMATOCRIT % 34.4*  --  38.3   PLATELETS Thousands/uL 298 310 325   TOTAL NEUT ABS Thousands/µL 3.57  --  10.83*         Results from last 7 days   Lab Units 04/09/24  0627 04/08/24  1159   SODIUM mmol/L 137 139   POTASSIUM mmol/L 4.6 4.1   CHLORIDE mmol/L 105 106   CO2 mmol/L 23 23   ANION GAP mmol/L 9 10   BUN mg/dL 9 9   CREATININE mg/dL 0.73 0.72   EGFR ml/min/1.73sq m 112 114   CALCIUM mg/dL 8.3* 9.0   MAGNESIUM mg/dL 2.0  --      Results from last 7 days   Lab Units 04/09/24  0627   AST U/L 30   ALT U/L 11   ALK PHOS U/L 38   TOTAL PROTEIN g/dL 6.1*   ALBUMIN g/dL 3.8   TOTAL BILIRUBIN mg/dL 0.68     Results from last 7 days   Lab Units 04/08/24  1153   POC GLUCOSE mg/dl 110     Results from last 7 days   Lab Units 04/09/24  0627 04/08/24  1159   GLUCOSE RANDOM mg/dL 93 112             No results found for: \"BETA-HYDROXYBUTYRATE\"                   Results from last 7 days   Lab Units 04/08/24  1550 04/08/24  1356 04/08/24  1222   HS TNI 0HR ng/L  --   --  <2   HS TNI 2HR ng/L <2 <2  --          Results from last 7 days   Lab Units 04/08/24  1222   PROTIME seconds 14.6*   INR  1.15   PTT seconds 27     Results from last 7 days   Lab Units 04/08/24  1159   TSH 3RD GENERATON uIU/mL 5.065*                                                         Results from last 7 days   Lab Units 04/08/24  1203   CLARITY UA  Slightly Cloudy   COLOR UA  Yellow   SPEC GRAV UA  1.015   PH UA  7.0   GLUCOSE UA mg/dl " Negative   KETONES UA mg/dl Negative   BLOOD UA  Trace*   PROTEIN UA mg/dl Negative   NITRITE UA  Negative   BILIRUBIN UA  Negative   UROBILINOGEN UA E.U./dl 0.2   LEUKOCYTES UA  Small*   WBC UA /hpf Innumerable*   RBC UA /hpf None Seen   BACTERIA UA /hpf Moderate*   EPITHELIAL CELLS WET PREP /hpf Occasional   MUCUS THREADS  Occasional*     Results from last 7 days   Lab Units 04/08/24  1222   INFLUENZA A PCR  Negative   INFLUENZA B PCR  Negative   RSV PCR  Negative         Results from last 7 days   Lab Units 04/08/24  1203   AMPH/METH  Negative   BARBITURATE UR  Negative   BENZODIAZEPINE UR  Negative   COCAINE UR  Negative   METHADONE URINE  Negative   OPIATE UR  Negative   PCP UR  Negative   THC UR  Negative                     Results from last 7 days   Lab Units 04/08/24  1203   URINE CULTURE  60,000-69,000 cfu/ml                   Medications:   Scheduled Medications:  enoxaparin, 40 mg, Subcutaneous, Daily  fluticasone, 1 spray, Nasal, Daily      Continuous IV Infusions:     PRN Meds:  dicyclomine, 10 mg, Oral, TID PRN        Discharge Plan:  tbd    Network Utilization Review Department  ATTENTION: Please call with any questions or concerns to [phone] and carefully listen to the prompts so that you are directed to the right person. All voicemails are confidential.   For Discharge needs, contact Care Management DC Support Team at [phone] opt. 2  Send all requests for admission clinical reviews, approved or denied determinations and any other requests to dedicated fax number below belonging to the campus where the patient is receiving treatment. List of dedicated fax numbers for the Facilities:  FACILITY NAME UR FAX NUMBER   ADMISSION DENIALS (Administrative/Medical Necessity) [phone]   DISCHARGE SUPPORT TEAM (NETWORK) [phone]   PARENT CHILD HEALTH (Maternity/NICU/Pediatrics) [phone]   [facility] [phone]   [facility]  [phone]   [facility] [phone]   [facility] [phone]   [facility] [phone]   [facility] [phone]   [facility] [phone]   [facility] [phone]   [facility] [phone]   [facility] [phone]   [facility] [phone]   [facility] [phone]

## 2024-04-11 ENCOUNTER — TELEPHONE (OUTPATIENT)
Age: 29
End: 2024-04-11

## 2024-04-18 ENCOUNTER — OFFICE VISIT (OUTPATIENT)
Dept: FAMILY MEDICINE CLINIC | Facility: MEDICAL CENTER | Age: 29
End: 2024-04-18
Payer: COMMERCIAL

## 2024-04-18 ENCOUNTER — TELEPHONE (OUTPATIENT)
Age: 29
End: 2024-04-18

## 2024-04-18 VITALS
DIASTOLIC BLOOD PRESSURE: 72 MMHG | BODY MASS INDEX: 22.03 KG/M2 | OXYGEN SATURATION: 99 % | TEMPERATURE: 98 F | RESPIRATION RATE: 18 BRPM | SYSTOLIC BLOOD PRESSURE: 112 MMHG | WEIGHT: 132.4 LBS | HEART RATE: 58 BPM

## 2024-04-18 DIAGNOSIS — F41.9 ANXIETY: ICD-10-CM

## 2024-04-18 DIAGNOSIS — R25.3 TWITCHING: ICD-10-CM

## 2024-04-18 DIAGNOSIS — F33.1 MODERATE EPISODE OF RECURRENT MAJOR DEPRESSIVE DISORDER (HCC): ICD-10-CM

## 2024-04-18 DIAGNOSIS — Z76.89 ENCOUNTER FOR SUPPORT AND COORDINATION OF TRANSITION OF CARE: Primary | ICD-10-CM

## 2024-04-18 DIAGNOSIS — F41.9 ANXIETY: Primary | ICD-10-CM

## 2024-04-18 DIAGNOSIS — R47.1 DYSARTHRIA: ICD-10-CM

## 2024-04-18 DIAGNOSIS — M79.2 NEUROPATHIC PAIN: ICD-10-CM

## 2024-04-18 PROCEDURE — 99495 TRANSJ CARE MGMT MOD F2F 14D: CPT

## 2024-04-18 RX ORDER — FLUOXETINE 20 MG/1
20 TABLET, FILM COATED ORAL DAILY
Qty: 30 TABLET | Refills: 1 | Status: SHIPPED | OUTPATIENT
Start: 2024-04-18
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
## 2024-04-18 NOTE — TELEPHONE ENCOUNTER
Pt called in to give a message to Stephani Philippe. Pt states the medication that she could not remember at the time of her visit was Prozac 20mg. Pt states she was on this medication years ago. Pt states if the doctor would like to prescribe this medication to please sent it to the [facility] in [place].    Please advise.

## 2024-04-18 NOTE — TELEPHONE ENCOUNTER
I will send the prescription in now to [facility] in [place] for Prozac 20 mg daily.  However, please inform patient I would like her to start with half a tablet (10 mg total) daily x 1 week then increase to the full 20 mg daily.  Follow-up on 6/6 as scheduled.

## 2024-04-19 NOTE — TELEPHONE ENCOUNTER
Pt sent the following my chart msg, see below:    Maryse Reid   to  [facility] (supporting ROSA M Villarreal)         4/18/24  8:05 PM  PELASE SEND TO SANAZ.   Lorena! So I can’t cut the pill in half because it’s a capsule, so should I just take the 20mg??  This encounter is not signed. The conversation

## 2024-04-19 NOTE — TELEPHONE ENCOUNTER
I specifically sent the tablet form of this medication in so that it could be cut in half versus the capsule form.  However, if patient only has the capsule form then yes, okay to take 20 mg daily.

## 2024-04-22 NOTE — TELEPHONE ENCOUNTER
Spoke with pt. She will take the 20 mg capsule. She will also double check before leaving the pharmacy with her next refill to make sure it is the tablet form and not the capsule.

## 2024-05-01 ENCOUNTER — TELEPHONE (OUTPATIENT)
Dept: NEUROLOGY | Facility: CLINIC | Age: 29
End: 2024-05-01

## 2024-05-01 NOTE — TELEPHONE ENCOUNTER
1ST ATTEMPT,     Called pt no answer, LMOM.    LETTER SENT     Thank you,     Nelly PERAZA/ JOHN JONES/ Dysarthria     DC- HOME- 4/10/2024    Maryse Reid will need follow up in in 4 weeks with general AP/Attending .  She will not require outpatient neurological testing.

## 2024-06-03 ENCOUNTER — TELEPHONE (OUTPATIENT)
Dept: OBGYN CLINIC | Facility: CLINIC | Age: 29
End: 2024-06-03

## 2024-06-20 ENCOUNTER — OFFICE VISIT (OUTPATIENT)
Dept: OBGYN CLINIC | Facility: MEDICAL CENTER | Age: 29
End: 2024-06-20
Payer: COMMERCIAL

## 2024-06-20 VITALS
HEIGHT: 65 IN | DIASTOLIC BLOOD PRESSURE: 70 MMHG | WEIGHT: 135.3 LBS | SYSTOLIC BLOOD PRESSURE: 110 MMHG | BODY MASS INDEX: 22.54 KG/M2

## 2024-06-20 DIAGNOSIS — G89.29 CHRONIC PELVIC PAIN IN FEMALE: ICD-10-CM

## 2024-06-20 DIAGNOSIS — R10.2 CHRONIC PELVIC PAIN IN FEMALE: ICD-10-CM

## 2024-06-20 DIAGNOSIS — N94.6 DYSMENORRHEA: Primary | ICD-10-CM

## 2024-06-20 PROCEDURE — 99214 OFFICE O/P EST MOD 30 MIN: CPT | Performed by: STUDENT IN AN ORGANIZED HEALTH CARE EDUCATION/TRAINING PROGRAM

## 2024-06-20 NOTE — ASSESSMENT & PLAN NOTE
- We have suspected endometriosis for some time given her severe dysmenorrhea with bowel symptoms. Her workup for Crohns was negative.  - She currently contracepts with Copper IUD.  - We had previously discussed trying Mirena IUD instead to potentially help with dysmenorrhea, and she would like to return for this.  - We discussed the role of diagnostic laparoscopy for diagnosis and staging of of endometriosis, and the benefit of diagnostic clarity prior to starting GnRH analogues/antagonists.  - She opts to first empirically try swapping Copper IUD for Mirena IUD. She has a history of severe mood disturbance and suicidal ideation with oral contraceptives.  - We discussed evidence for alternative medicine therapies for endometriosis including acupuncture as an adjunct.  We discussed the role of pelvic PT.

## 2024-06-20 NOTE — PROGRESS NOTES
[facility] [address], [place], [place]    Assessment/Plan:  Maryse Reid is a 29 y.o.  who follows up for severe dysmenorrhea and chronic pelvic pain.    Dysmenorrhea  - We have suspected endometriosis for some time given her severe dysmenorrhea with bowel symptoms. Her workup for Crohns was negative.  - She currently contracepts with Copper IUD.  - We had previously discussed trying Mirena IUD instead to potentially help with dysmenorrhea, and she would like to return for this.  - We discussed the role of diagnostic laparoscopy for diagnosis and staging of of endometriosis, and the benefit of diagnostic clarity prior to starting GnRH analogues/antagonists.  - She opts to first empirically try swapping Copper IUD for Mirena IUD. She has a history of severe mood disturbance and suicidal ideation with oral contraceptives.  - We discussed evidence for alternative medicine therapies for endometriosis including acupuncture as an adjunct.  We discussed the role of pelvic PT.    Diagnoses and all orders for this visit:    Dysmenorrhea    Chronic pelvic pain in female          Subjective:   Maryse Reid is a 29 y.o.  female.  CC: follow up    HPI: Maryse presents for annual GYN visit.  She has a longstanding history of severe dysmenorrhea for which she has to medicate with ibuprofen 800 mg plus tylenol 1 gram and still needs to take time off from work due to the intensity of the pain.  She contracepts with Parguard IUD.   She has a history of severe mood disturbance with hormonal contraception in the past.    Give the severity of her bowel symptoms she underwent a work up for Crohns but colonscopy with biopsy was negative.  She returns for management of presumed endometriosis.        ROS: Review of Systems   Constitutional:  Negative for chills and fever.   Respiratory:  Negative for cough and shortness of breath.    Cardiovascular:  Negative for chest pain and leg  "swelling.   Gastrointestinal:  Negative for abdominal pain, nausea and vomiting.   Genitourinary:  Negative for dysuria, frequency and urgency.   Neurological:  Negative for dizziness, light-headedness and headaches.       PFSH: The following portions of the patient's history were reviewed and updated as appropriate: allergies, current medications, past family history, past medical history, obstetric history, gynecologic history, past social history, past surgical history and problem list.       Objective:  /70   Ht 5' 5\" (1.651 m)   Wt 61.4 kg (135 lb 4.8 oz)   LMP 05/14/2024 (Exact Date)   BMI 22.52 kg/m²    Physical Exam  Constitutional:       Appearance: Normal appearance.   Cardiovascular:      Rate and Rhythm: Normal rate.   Pulmonary:      Effort: Pulmonary effort is normal.   Neurological:      Mental Status: She is alert.   Psychiatric:         Mood and Affect: Mood normal.         Behavior: Behavior normal.           I have spent a total time of 30 minutes on 06/20/24 in caring for this patient including Diagnostic results, Risks and benefits of tx options, and Instructions for management.    Mirlande Mendez MD  [facility]  [facility]  6/20/2024 1:30 PM    "

## 2024-06-27 ENCOUNTER — PROCEDURE VISIT (OUTPATIENT)
Dept: OBGYN CLINIC | Facility: MEDICAL CENTER | Age: 29
End: 2024-06-27
Payer: COMMERCIAL

## 2024-06-27 VITALS
WEIGHT: 130.2 LBS | HEIGHT: 65 IN | DIASTOLIC BLOOD PRESSURE: 70 MMHG | SYSTOLIC BLOOD PRESSURE: 110 MMHG | BODY MASS INDEX: 21.69 KG/M2

## 2024-06-27 DIAGNOSIS — Z30.430 ENCOUNTER FOR IUD INSERTION: Primary | ICD-10-CM

## 2024-06-27 PROCEDURE — 58301 REMOVE INTRAUTERINE DEVICE: CPT | Performed by: STUDENT IN AN ORGANIZED HEALTH CARE EDUCATION/TRAINING PROGRAM

## 2024-06-27 PROCEDURE — 58300 INSERT INTRAUTERINE DEVICE: CPT | Performed by: STUDENT IN AN ORGANIZED HEALTH CARE EDUCATION/TRAINING PROGRAM

## 2024-06-27 NOTE — PROGRESS NOTES
[facility] [address], [place], [place]    Iud removal    Performed by: Mirlande Mendez MD  Authorized by: Mirlande Mendez MD    Procedure: IUD removal and insertion    Consent obtained by patient, parent, or legal power of  - including discussion of procedure risks and benefits, patient questions answered, and patient education provided.: yes    Reason for removal: patient request    Strings visualized: yes    IUD removed: yes    Date/Time of Removal:  6/27/2024 8:30 AM  Removed without complications: yes    IUD intact: yes    Pregnancy risk: reasonably certain the patient is not pregnant    Pre-Medications:  Ibprofen and tylenol  Date/Time of Insertion:  6/27/2024 8:30 AM  Immediately prior to procedure a time out was called: yes    Pelvic exam performed: yes    Speculum placed in vagina: yes    Cervix cleaned and prepped: yes    Tenaculum/Allis/Ring Forceps applied to cervix: yes    Uterus sound depth (cm):  5.5  IUD inserted without complications: yes    IUD type:  Levonorgestrel 20 MCG/DAY  Strings trimmed to (cm):  3  Patient tolerated procedure well: yes    Inserted with ultrasound guidance: no    Transvaginal sono confirmed fundal placement: yes    Estimated blood loss (mL):  0  Intended removal date: 8 years     Uterus sounded to 5.5 cm but US indicated 7.5 cm.    Inserted no further than 5.5 cm, patient found the procedure to be painless.        Mirlande Mendez MD  [facility]  [facility]  6/27/2024 1:40 PM

## 2024-07-09 ENCOUNTER — OFFICE VISIT (OUTPATIENT)
Dept: RHEUMATOLOGY | Facility: CLINIC | Age: 29
End: 2024-07-09
Payer: COMMERCIAL

## 2024-07-09 VITALS
HEIGHT: 65 IN | BODY MASS INDEX: 22.49 KG/M2 | TEMPERATURE: 98.5 F | SYSTOLIC BLOOD PRESSURE: 110 MMHG | OXYGEN SATURATION: 100 % | DIASTOLIC BLOOD PRESSURE: 70 MMHG | HEART RATE: 62 BPM | WEIGHT: 135 LBS

## 2024-07-09 DIAGNOSIS — R76.8 ANA POSITIVE: Primary | ICD-10-CM

## 2024-07-09 DIAGNOSIS — R76.8 THYROID ANTIBODY POSITIVE: ICD-10-CM

## 2024-07-09 DIAGNOSIS — R76.8 CENTROMERE ANTIBODY POSITIVE: ICD-10-CM

## 2024-07-09 DIAGNOSIS — R53.83 OTHER FATIGUE: ICD-10-CM

## 2024-07-09 DIAGNOSIS — M25.50 ARTHRALGIA, UNSPECIFIED JOINT: ICD-10-CM

## 2024-07-09 PROCEDURE — 99213 OFFICE O/P EST LOW 20 MIN: CPT | Performed by: PHYSICIAN ASSISTANT

## 2024-07-09 NOTE — PROGRESS NOTES
Assessment and Plan:   Ms. Reid is a 29-year-old female who presents for rheumatology follow-up of +TEO 1:80 and +centromere ab (1.2).      The patient initially presented with complaint of fatigue, polyarthralgia with stiffness and swelling, intermittent salivary gland swelling, dry eye and dry mouth, Raynaud's phenomenon, diarrhea, GERD, and family history of SLE in paternal grandmother.  Complete serologic workup revealed +TEO 1:80, +centromere ab (1.2), +thyroglobulin ab (2), and +Iona (70, suggestive of Crohn's disease, despite normal colonoscopy 11/16/23).  Lip biopsy was unremarkable for Sjogren's 10/31/23.     At this time, her workup does not reveal a specific rheumatological diagnosis and therefore we will take a monitoring approach.  The low titer TEO could potentially be due to the positive thyroid antibody and the low titer centromere antibody is nonspecific at this time.  I believe that her symptoms are multifactorial in nature in light of other underlying conditions such as migraine headaches, iron deficiency anemia, and PCOS.      Due to symptoms including fatigue, night sweats, and hot/cold intolerance, I recommend endocrinology evaluation due to recent abnormal TSH and positive thyroid antibody for consideration of autoimmune thyroid disease which could be contributing to her symptoms.  She will follow-up with us in the fall again after this evaluation to see if she requires any specific treatment from a rheumatology standpoint.  We discussed that since she did notice some improvement with steroids previously, there is consideration for Plaquenil in the future.    Follow-up in 3 to 4 months    Plan:  Diagnoses and all orders for this visit:    TEO positive  -     Ambulatory Referral to Endocrinology; Future    Centromere antibody positive    Thyroid antibody positive  -     Ambulatory Referral to Endocrinology; Future    Other fatigue  -     Ambulatory Referral to Endocrinology;  Future    Arthralgia, unspecified joint  -     Ambulatory Referral to Endocrinology; Future        I have personally reviewed prior notes, recent laboratory results, and pertinent films in PACS.     Activities as tolerated.   Exercise: try to maintain a low impact exercise regimen as much as possible. Walk for 30 minutes a day for at least 3 days a week.   Continue other medications as prescribed by PCP and other specialists.       RTC in 3 to 4 months    Rheumatic Disease Summary:  +TEO 1:80, +Centromere ab (1.2)  -Initial visit 10/12/23:  fatigue, polyarthralgia with stiffness and swelling, intermittent salivary gland swelling, severe dry eye and dry mouth, Raynaud's phenomenon, diarrhea, GERD, and has a family history of SLE in paternal grandmother.  Recent labs as ordered by the primary team revealed TEO 1:80 in a speckled pattern and otherwise unremarkable in terms of dsDNA, SSA, SSB, Joseph, RNP, RF, CCP.  Arrange for lip bx to r/o SS and refer to GI for c/o diarrhea and GERD.  -Labs 10/2023: +TEO 1:80, +centromere ab (1.2), +thyroglobulin ab (2), and +Iona (70, suggestive of Crohn's disease). Neg C3, C4, ESR, Hep panel, B2 glyco, HLA-B27, hisotne, cardiolipin, A1 antitrypsin, cryo, SCL-70, mitochondrial ab, U1RNP, RNA poly III, Th/To  -Salivary gland bx 10/31/23: neg for SS  -Colonoscopy 11/16/23: normal  -Visit 11/27/23: W/u does not reveal a specific rheumatological diagnosis at this time and therefore we will take a monitoring approach.  The low titer TEO could potentially be due to the positive thyroid antibody and the low titer centromere antibody is nonspecific at this time.  -Visit 4/3/24: c/o fatigue, decreased appetite, strep pharyngitis, sinusitis, night sweats, and bodyaches since January despite multiple rounds of antibiotics including amoxicillin, Augmentin, and clindamycin.  She was also prescribed prednisone which has not seemed to help with her aches or other symptoms.  We discussed that it is  possible that this could be viral in nature (lost taste and smell in January and had URI symptoms, therefore could have been COVID or flu but was tested too late) as workup has been so far unremarkable.  Will add Lyme.  ANCA panel was also ordered by the primary team likely in light of recurrent sinusitis, will await this as well.  -Visit 7/9/2024: Monitor for now, refer to Endo  2.  Other comorbidities: Positive thyroid antibody, iron deficiency anemia, dysmenorrhea        HPI  Ms. Reid is a 29-year-old female who presents for rheumatology follow-up of +TEO 1:80 and +centromere ab (1.2).     Complains of fatigue, night sweats, hot/cold intolerance.  Notes a tingling on her face when out in the sun.  +Generalized body aches.  +Pelvic pain.  + Some numbness and tingling      The following portions of the patient's history were reviewed and updated as appropriate: allergies, current medications, past family history, past medical history, past social history, past surgical history and problem list.      Review of Systems  Constitutional: +night sweats, fatigue.  ENT/Mouth: Negative for hearing changes, ear pain, nasal congestion, sinus pain, hoarseness, sore throat, rhinorrhea, swallowing difficulty.   Eyes: Negative for pain, redness, discharge, vision changes.   Cardiovascular: Negative for chest pain, SOB, palpitations.   Respiratory: Negative for cough, sputum, wheezing, dyspnea.   Gastrointestinal: + Decreased appetite  Genitourinary: Negative for dysuria, urinary frequency, hematuria.   Musculoskeletal: As per HPI.  Skin: Negative for skin rash, color changes.   Heme/Lymph: Negative for easy bruising, bleeding, lymphadenopathy.           Past Medical History:   Diagnosis Date    Abdominal pain     with nausea    ADHD     Anemia     Anxiety     Chlamydia     Diarrhea     GERD (gastroesophageal reflux disease)     Migraine     Mononucleosis     Ovarian cyst     PONV (postoperative nausea and vomiting)        Past  Surgical History:   Procedure Laterality Date    SINUS SURGERY      WISDOM TOOTH EXTRACTION         Social History     Socioeconomic History    Marital status: Single     Spouse name: Not on file    Number of children: Not on file    Years of education: Not on file    Highest education level: Not on file   Occupational History    Not on file   Tobacco Use    Smoking status: Never    Smokeless tobacco: Never   Vaping Use    Vaping status: Never Used   Substance and Sexual Activity    Alcohol use: Yes     Alcohol/week: 1.0 standard drink of alcohol     Types: 1 Glasses of wine per week     Comment: socially weekly    Drug use: Never    Sexual activity: Yes     Partners: Male     Birth control/protection: I.U.D.     Comment: history STD-chlamydia   Other Topics Concern    Not on file   Social History Narrative    Lives in house with BF            Currently in college        Not working while in school     Social Determinants of Health     Financial Resource Strain: Not on file   Food Insecurity: No Food Insecurity (4/8/2024)    Hunger Vital Sign     Worried About Running Out of Food in the Last Year: Never true     Ran Out of Food in the Last Year: Never true   Transportation Needs: No Transportation Needs (4/8/2024)    PRAPARE - Transportation     Lack of Transportation (Medical): No     Lack of Transportation (Non-Medical): No   Physical Activity: Not on file   Stress: Not on file   Social Connections: Unknown (6/18/2024)    Received from [facility]    Social Connections     How often do you feel lonely or isolated from those around you? (Adult - for ages 18 years and over): Not on file   Intimate Partner Violence: Not on file   Housing Stability: Low Risk  (4/8/2024)    Housing Stability Vital Sign     Unable to Pay for Housing in the Last Year: No     Number of Times Moved in the Last Year: 1     Homeless in the Last Year: No       Family History   Problem Relation Age of Onset    Hypertension Mother      "Hypertension Father     No Known Problems Brother     Hypertension Maternal Grandmother     Cancer Maternal Grandfather         bladder    Lupus Paternal Grandmother     Diabetes Paternal Grandmother     Parkinsonism Paternal Grandmother     Alzheimer's disease Paternal Grandfather        Allergies   Allergen Reactions    Amoxicillin-Pot Clavulanate Facial Swelling     Other Reaction(s): Facial Swelling         Current Outpatient Medications:     fluticasone (FLONASE) 50 mcg/act nasal spray, 1 spray into each nostril daily, Disp: 9.9 mL, Rfl: 0    Magnesium Gluconate 550 MG TABS, Take 400 mg by mouth 2 (two) times a day, Disp: , Rfl:     Multiple Vitamin (MULTIVITAMIN ADULT PO), Take by mouth, Disp: , Rfl:     NON FORMULARY, Citlaly Villegas, Disp: , Rfl:     VITAMIN D PO, Take by mouth, Disp: , Rfl:     dicyclomine (BENTYL) 10 mg capsule, Take 1 capsule (10 mg total) by mouth 3 (three) times a day as needed (abdominal cramping) (Patient not taking: Reported on 7/9/2024), Disp: 40 capsule, Rfl: 5    FLUoxetine (PROzac) 20 MG tablet, Take 1 tablet (20 mg total) by mouth daily (Patient not taking: Reported on 7/9/2024), Disp: 30 tablet, Rfl: 1    gabapentin (NEURONTIN) 100 mg capsule, Take 1 capsule (100 mg total) by mouth 3 (three) times a day for 14 days (Patient not taking: Reported on 4/18/2024), Disp: 42 capsule, Rfl: 0    Restasis 0.05 % ophthalmic emulsion, , Disp: , Rfl:       Objective:    Vitals:    07/09/24 0836   BP: 110/70   Pulse: 62   Temp: 98.5 °F (36.9 °C)   SpO2: 100%   Weight: 61.2 kg (135 lb)   Height: 5' 5\" (1.651 m)       Physical Exam  General: Well appearing, well nourished, in no acute distress. Oriented x 3, normal mood and affect.  Ambulating without difficulty.  Skin: Good turgor, no rash, unusual bruising or prominent lesions.  Hair: Normal texture and distribution.  Nails: Normal color, no deformities.  HEENT:  Head: Normocephalic, atraumatic.  Eyes: Conjunctiva clear, sclera non-icteric, EOM " intact.  Nose: No external lesions, mucosa non-inflamed.  Mouth: Mucous membranes moist, no mucosal lesions.  Neck: Supple   Musculoskeletal:   No asymmetry or deformities noted of bilateral upper and lower extremities.  No tenderness to palpation or swelling of joints bilaterally to include: shoulder, elbow, wrist, MCP I-V, PIP I-V, knee, ankle   Neurologic: Alert and oriented. No focal neurological deficits appreciated.   Psychiatric: Normal mood and affect.       Javier Dolan PA-C  Rheumatology

## 2024-07-11 DIAGNOSIS — M25.50 ARTHRALGIA, UNSPECIFIED JOINT: Primary | ICD-10-CM

## 2024-07-11 RX ORDER — METHYLPREDNISOLONE 4 MG/1
TABLET ORAL
Qty: 21 TABLET | Refills: 0 | Status: SHIPPED | OUTPATIENT
Start: 2024-07-11

## 2024-07-23 ENCOUNTER — CONSULT (OUTPATIENT)
Dept: ENDOCRINOLOGY | Facility: CLINIC | Age: 29
End: 2024-07-23
Payer: COMMERCIAL

## 2024-07-23 VITALS
BODY MASS INDEX: 22.06 KG/M2 | OXYGEN SATURATION: 99 % | TEMPERATURE: 98.2 F | HEIGHT: 65 IN | WEIGHT: 132.4 LBS | SYSTOLIC BLOOD PRESSURE: 118 MMHG | DIASTOLIC BLOOD PRESSURE: 68 MMHG | HEART RATE: 72 BPM

## 2024-07-23 DIAGNOSIS — E55.9 VITAMIN D DEFICIENCY: ICD-10-CM

## 2024-07-23 DIAGNOSIS — R76.8 THYROID ANTIBODY POSITIVE: Primary | ICD-10-CM

## 2024-07-23 DIAGNOSIS — D50.9 IRON DEFICIENCY ANEMIA, UNSPECIFIED IRON DEFICIENCY ANEMIA TYPE: ICD-10-CM

## 2024-07-23 DIAGNOSIS — M25.50 ARTHRALGIA, UNSPECIFIED JOINT: ICD-10-CM

## 2024-07-23 DIAGNOSIS — R76.8 ANA POSITIVE: ICD-10-CM

## 2024-07-23 DIAGNOSIS — R53.83 OTHER FATIGUE: ICD-10-CM

## 2024-07-23 DIAGNOSIS — D64.9 ANEMIA, UNSPECIFIED TYPE: ICD-10-CM

## 2024-07-23 PROCEDURE — 99204 OFFICE O/P NEW MOD 45 MIN: CPT | Performed by: STUDENT IN AN ORGANIZED HEALTH CARE EDUCATION/TRAINING PROGRAM

## 2024-07-23 NOTE — ASSESSMENT & PLAN NOTE
Minimally positive Tg ab, normal anti-microsomal. Last TSH minimally elevated with normal free T4, labs obtained during ED presentation. No clear role for thyroid hormone replacement as I do not anticipate symptomatic benefit. However, may consider on trial basis if TSH persistently elevated >2.5. Advised of recommendation to otherwise have follow up thyroid testing performed annually to observe for any progression to hypothyroidism. I will contact patient with results and additional recommendations.

## 2024-07-23 NOTE — PROGRESS NOTES
Maryse Scottrer 29 y.o. female MRN: [id number]    Encounter: [id number]      Assessment & Plan     Problem List Items Addressed This Visit     Iron deficiency anemia     Very low ferritin levels on last check (<10). I explained how iron deficiency can contribute to significant symptoms, including cold intolerance and fatigue. We should have this reassessed         Fatigue    Thyroid antibody positive - Primary     Minimally positive Tg ab, normal anti-microsomal. Last TSH minimally elevated with normal free T4, labs obtained during ED presentation. No clear role for thyroid hormone replacement as I do not anticipate symptomatic benefit. However, may consider on trial basis if TSH persistently elevated >2.5. Advised of recommendation to otherwise have follow up thyroid testing performed annually to observe for any progression to hypothyroidism. I will contact patient with results and additional recommendations.          Relevant Orders    TSH, 3rd generation    T4, free    Comprehensive metabolic panel   Other Visit Diagnoses     Arthralgia, unspecified joint        TEO positive        Anemia, unspecified type        Relevant Orders    CBC and differential    Iron Panel (Includes Ferritin, Iron Sat%, Iron, and TIBC)    Comprehensive metabolic panel    Vitamin D deficiency        Relevant Orders    Vitamin D 25 hydroxy        RTC TBD    CC: thyroid ab positive    History of Present Illness     HPI:    Maryse presents for endocrine evaluation. There is history of positive Tg ab and recently elevated TSH. Patient had presented to ED 4/9/24 for dysarthria and tremors. She had neurologic evaluation and labs, which included TSH which was >5 but <7.5 with normal free T4. She mentions treatment course including steroids, antibiotics, among others. She mentions chronic fatigue which she explains as feeling like she could fall asleep. She mentions preference for avoidance of pharmacotherapy, and has attempted strategies  including changing diet to gluten free. She has history of endometriosis with heavy, prolonged menses. She recently changed from copper IUD to mirena.     Review of Systems   Constitutional:  Positive for appetite change (decreased) and fatigue (chronic). Negative for unexpected weight change.   HENT:  Negative for trouble swallowing and voice change.    Cardiovascular:  Negative for palpitations.   Gastrointestinal:  Positive for nausea.   Endocrine: Positive for cold intolerance.        Can wake up in sweat, will typically leave room ~65-67 degrees F   Musculoskeletal:  Positive for myalgias.   Neurological:  Positive for numbness. Negative for tremors.   All other systems reviewed and are negative.      Historical Information   Past Medical History:   Diagnosis Date   • Abdominal pain     with nausea   • ADHD    • Anemia    • Anxiety    • Chlamydia    • Diarrhea    • GERD (gastroesophageal reflux disease)    • Migraine    • Mononucleosis    • Ovarian cyst    • PONV (postoperative nausea and vomiting)      Past Surgical History:   Procedure Laterality Date   • SINUS SURGERY     • WISDOM TOOTH EXTRACTION       Social History   Social History     Substance and Sexual Activity   Alcohol Use Yes   • Alcohol/week: 1.0 standard drink of alcohol   • Types: 1 Glasses of wine per week    Comment: socially weekly     Social History     Substance and Sexual Activity   Drug Use Never     Social History     Tobacco Use   Smoking Status Never   Smokeless Tobacco Never     Family History:   Family History   Problem Relation Age of Onset   • Hypertension Mother    • Hypertension Father    • No Known Problems Brother    • Hypertension Maternal Grandmother    • Cancer Maternal Grandfather         bladder   • Lupus Paternal Grandmother    • Diabetes Paternal Grandmother    • Parkinsonism Paternal Grandmother    • Alzheimer's disease Paternal Grandfather        Meds/Allergies   Current Outpatient Medications   Medication Sig Dispense  "Refill   • fluticasone (FLONASE) 50 mcg/act nasal spray 1 spray into each nostril daily 9.9 mL 0   • Magnesium Gluconate 550 MG TABS Take 400 mg by mouth 2 (two) times a day     • Multiple Vitamin (MULTIVITAMIN ADULT PO) Take by mouth     • VITAMIN D PO Take by mouth     • dicyclomine (BENTYL) 10 mg capsule Take 1 capsule (10 mg total) by mouth 3 (three) times a day as needed (abdominal cramping) (Patient not taking: Reported on 7/9/2024) 40 capsule 5   • FLUoxetine (PROzac) 20 MG tablet Take 1 tablet (20 mg total) by mouth daily (Patient not taking: Reported on 7/9/2024) 30 tablet 1   • gabapentin (NEURONTIN) 100 mg capsule Take 1 capsule (100 mg total) by mouth 3 (three) times a day for 14 days (Patient not taking: Reported on 4/18/2024) 42 capsule 0   • methylPREDNISolone 4 MG tablet therapy pack Use as directed on package (Patient not taking: Reported on 7/23/2024) 21 tablet 0   • NON FORMULARY Citlaly Villegas (Patient not taking: Reported on 7/23/2024)     • Restasis 0.05 % ophthalmic emulsion  (Patient not taking: Reported on 7/9/2024)       No current facility-administered medications for this visit.     Allergies   Allergen Reactions   • Amoxicillin-Pot Clavulanate Facial Swelling     Other Reaction(s): Facial Swelling       Objective   Vitals: Blood pressure 118/68, pulse 72, temperature 98.2 °F (36.8 °C), height 5' 5\" (1.651 m), weight 60.1 kg (132 lb 6.4 oz), SpO2 99%.    Physical Exam  Vitals reviewed.   Constitutional:       Appearance: Normal appearance.   HENT:      Head: Normocephalic and atraumatic.      Nose: Nose normal.   Eyes:      General: No scleral icterus.     Conjunctiva/sclera: Conjunctivae normal.   Neck:      Thyroid: No thyroid mass, thyromegaly or thyroid tenderness.   Pulmonary:      Effort: Pulmonary effort is normal. No respiratory distress.   Musculoskeletal:         General: No deformity.      Cervical back: Normal range of motion.   Lymphadenopathy:      Cervical: No cervical " "adenopathy.   Skin:     General: Skin is warm and dry.   Neurological:      General: No focal deficit present.      Mental Status: She is alert.   Psychiatric:         Mood and Affect: Mood normal.         Behavior: Behavior normal.         The history was obtained from the review of the chart, patient.    Lab Results:   Lab Results   Component Value Date/Time    TSH 3RD GENERATON 5.065 (H) 04/08/2024 11:59 AM    TSH 3RD GENERATON 3.080 02/09/2024 10:24 AM    Free T4 0.77 04/08/2024 11:59 AM     Lab Results   Component Value Date    WBC 5.90 04/09/2024    HGB 11.4 (L) 04/09/2024    HCT 34.4 (L) 04/09/2024    MCV 89 04/09/2024     04/09/2024     Component      Latest Ref Rng 10/12/2023   THYROID MICROSOMAL ANTIBODY      0 - 34 IU/mL 13    THYROGLOBULIN AB      0.0 - 0.9 IU/mL 2.0 (H)       Component      Latest Ref Rng 2/9/2024   Iron Saturation      15 - 50 % 36    TIBC      250 - 450 ug/dL 417    Iron      50 - 212 ug/dL 152    UIBC      155 - 355 ug/dL 265    VITAMIN D, 25-HYDROXY      30.0 - 100.0 ng/mL 22.0 (L)    FERRITIN      11 - 307 ng/mL 6 (L)       Legend:  (L) Low    Imaging Studies:       I have personally reviewed pertinent reports.      Portions of the record may have been created with voice recognition software. Occasional wrong word or \"sound a like\" substitutions may have occurred due to the inherent limitations of voice recognition software. Read the chart carefully and recognize, using context, where substitutions have occurred.    "

## 2024-07-23 NOTE — ASSESSMENT & PLAN NOTE
Very low ferritin levels on last check (<10). I explained how iron deficiency can contribute to significant symptoms, including cold intolerance and fatigue. We should have this reassessed

## 2024-07-24 ENCOUNTER — APPOINTMENT (OUTPATIENT)
Dept: LAB | Facility: MEDICAL CENTER | Age: 29
End: 2024-07-24
Payer: COMMERCIAL

## 2024-07-24 LAB
25(OH)D3 SERPL-MCNC: 49.1 NG/ML (ref 30–100)
ALBUMIN SERPL BCG-MCNC: 4.4 G/DL (ref 3.5–5)
ALP SERPL-CCNC: 51 U/L (ref 34–104)
ALT SERPL W P-5'-P-CCNC: 8 U/L (ref 7–52)
ANION GAP SERPL CALCULATED.3IONS-SCNC: 9 MMOL/L (ref 4–13)
AST SERPL W P-5'-P-CCNC: 10 U/L (ref 13–39)
BASOPHILS # BLD AUTO: 0.02 THOUSANDS/ÂΜL (ref 0–0.1)
BASOPHILS NFR BLD AUTO: 0 % (ref 0–1)
BILIRUB SERPL-MCNC: 0.69 MG/DL (ref 0.2–1)
BUN SERPL-MCNC: 7 MG/DL (ref 5–25)
CALCIUM SERPL-MCNC: 9.4 MG/DL (ref 8.4–10.2)
CHLORIDE SERPL-SCNC: 103 MMOL/L (ref 96–108)
CO2 SERPL-SCNC: 28 MMOL/L (ref 21–32)
CREAT SERPL-MCNC: 0.83 MG/DL (ref 0.6–1.3)
EOSINOPHIL # BLD AUTO: 0.06 THOUSAND/ÂΜL (ref 0–0.61)
EOSINOPHIL NFR BLD AUTO: 1 % (ref 0–6)
ERYTHROCYTE [DISTWIDTH] IN BLOOD BY AUTOMATED COUNT: 13 % (ref 11.6–15.1)
FERRITIN SERPL-MCNC: 6 NG/ML (ref 11–307)
GFR SERPL CREATININE-BSD FRML MDRD: 96 ML/MIN/1.73SQ M
GLUCOSE P FAST SERPL-MCNC: 91 MG/DL (ref 65–99)
HCT VFR BLD AUTO: 40.9 % (ref 34.8–46.1)
HGB BLD-MCNC: 13.3 G/DL (ref 11.5–15.4)
IMM GRANULOCYTES # BLD AUTO: 0.02 THOUSAND/UL (ref 0–0.2)
IMM GRANULOCYTES NFR BLD AUTO: 0 % (ref 0–2)
IRON SATN MFR SERPL: 28 % (ref 15–50)
IRON SERPL-MCNC: 121 UG/DL (ref 50–212)
LYMPHOCYTES # BLD AUTO: 1.78 THOUSANDS/ÂΜL (ref 0.6–4.47)
LYMPHOCYTES NFR BLD AUTO: 30 % (ref 14–44)
MCH RBC QN AUTO: 28.4 PG (ref 26.8–34.3)
MCHC RBC AUTO-ENTMCNC: 32.5 G/DL (ref 31.4–37.4)
MCV RBC AUTO: 87 FL (ref 82–98)
MONOCYTES # BLD AUTO: 0.38 THOUSAND/ÂΜL (ref 0.17–1.22)
MONOCYTES NFR BLD AUTO: 7 % (ref 4–12)
NEUTROPHILS # BLD AUTO: 3.63 THOUSANDS/ÂΜL (ref 1.85–7.62)
NEUTS SEG NFR BLD AUTO: 62 % (ref 43–75)
NRBC BLD AUTO-RTO: 0 /100 WBCS
PLATELET # BLD AUTO: 294 THOUSANDS/UL (ref 149–390)
PMV BLD AUTO: 9.9 FL (ref 8.9–12.7)
POTASSIUM SERPL-SCNC: 4.2 MMOL/L (ref 3.5–5.3)
PROT SERPL-MCNC: 7.3 G/DL (ref 6.4–8.4)
RBC # BLD AUTO: 4.69 MILLION/UL (ref 3.81–5.12)
SODIUM SERPL-SCNC: 140 MMOL/L (ref 135–147)
T4 FREE SERPL-MCNC: 0.94 NG/DL (ref 0.61–1.12)
TIBC SERPL-MCNC: 430 UG/DL (ref 250–450)
TSH SERPL DL<=0.05 MIU/L-ACNC: 2.5 UIU/ML (ref 0.45–4.5)
UIBC SERPL-MCNC: 309 UG/DL (ref 155–355)
WBC # BLD AUTO: 5.89 THOUSAND/UL (ref 4.31–10.16)

## 2024-07-24 PROCEDURE — 82306 VITAMIN D 25 HYDROXY: CPT | Performed by: STUDENT IN AN ORGANIZED HEALTH CARE EDUCATION/TRAINING PROGRAM

## 2024-07-24 PROCEDURE — 84443 ASSAY THYROID STIM HORMONE: CPT | Performed by: STUDENT IN AN ORGANIZED HEALTH CARE EDUCATION/TRAINING PROGRAM

## 2024-07-24 PROCEDURE — 84439 ASSAY OF FREE THYROXINE: CPT | Performed by: STUDENT IN AN ORGANIZED HEALTH CARE EDUCATION/TRAINING PROGRAM

## 2024-07-24 PROCEDURE — 83540 ASSAY OF IRON: CPT | Performed by: STUDENT IN AN ORGANIZED HEALTH CARE EDUCATION/TRAINING PROGRAM

## 2024-07-24 PROCEDURE — 85025 COMPLETE CBC W/AUTO DIFF WBC: CPT | Performed by: STUDENT IN AN ORGANIZED HEALTH CARE EDUCATION/TRAINING PROGRAM

## 2024-07-24 PROCEDURE — 83550 IRON BINDING TEST: CPT | Performed by: STUDENT IN AN ORGANIZED HEALTH CARE EDUCATION/TRAINING PROGRAM

## 2024-07-24 PROCEDURE — 36415 COLL VENOUS BLD VENIPUNCTURE: CPT | Performed by: STUDENT IN AN ORGANIZED HEALTH CARE EDUCATION/TRAINING PROGRAM

## 2024-07-24 PROCEDURE — 80053 COMPREHEN METABOLIC PANEL: CPT | Performed by: STUDENT IN AN ORGANIZED HEALTH CARE EDUCATION/TRAINING PROGRAM

## 2024-07-24 PROCEDURE — 82728 ASSAY OF FERRITIN: CPT | Performed by: STUDENT IN AN ORGANIZED HEALTH CARE EDUCATION/TRAINING PROGRAM

## 2024-07-25 DIAGNOSIS — E61.1 IRON DEFICIENCY: Primary | ICD-10-CM

## 2024-07-25 DIAGNOSIS — R79.0 LOW FERRITIN: ICD-10-CM

## 2024-08-01 ENCOUNTER — OFFICE VISIT (OUTPATIENT)
Dept: HEMATOLOGY ONCOLOGY | Facility: CLINIC | Age: 29
End: 2024-08-01
Payer: COMMERCIAL

## 2024-08-01 VITALS
OXYGEN SATURATION: 100 % | BODY MASS INDEX: 21.83 KG/M2 | RESPIRATION RATE: 16 BRPM | HEIGHT: 65 IN | WEIGHT: 131 LBS | TEMPERATURE: 98.6 F | SYSTOLIC BLOOD PRESSURE: 112 MMHG | DIASTOLIC BLOOD PRESSURE: 70 MMHG | HEART RATE: 64 BPM

## 2024-08-01 DIAGNOSIS — R61 NIGHT SWEATS: ICD-10-CM

## 2024-08-01 DIAGNOSIS — R79.0 LOW FERRITIN: Primary | ICD-10-CM

## 2024-08-01 DIAGNOSIS — N83.202 CYST OF LEFT OVARY: ICD-10-CM

## 2024-08-01 PROCEDURE — 99203 OFFICE O/P NEW LOW 30 MIN: CPT | Performed by: PHYSICIAN ASSISTANT

## 2024-08-01 RX ORDER — BACILLUS COAGULANS 1B CELL
1 CAPSULE ORAL 2 TIMES DAILY
Qty: 60 TABLET | Refills: 2 | Status: SHIPPED | OUTPATIENT
Start: 2024-08-01

## 2024-08-01 NOTE — PROGRESS NOTES
[facility] HEMATOLOGY ONCOLOGY SPECIALISTS [address] [address]  Hematology Ambulatory Consult  Maryse Reid, 1995, [id number]  8/1/2024      Assessment and Plan   1. Low ferritin  - Ambulatory Referral to Hematology / Oncology  - Iron-Vitamin C (Vitron-C)  MG TABS; Take 1 tablet by mouth 2 (two) times a day  Dispense: 60 tablet; Refill: 2  - CBC and differential; Future  - Iron Panel (Includes Ferritin, Iron Sat%, Iron, and TIBC); Future  2. Cyst of left ovary  - US pelvis complete w transvaginal; Future  3. Night sweats    29-year-old female with past medical history as listed below who presents in consultation today for iron deficiency.  She reports menorrhagia.  She is working with GYN for management of this.  Recently had copper IUD switched to Mirena IUD.  Her last pelvic ultrasound showed multiseptated complex 4 x 2.7 x 2.8 cm structure within the left ovary or adjacent to the left ovary without discrete nodular component.  Follow-up was recommended but she has never had this performed. no other bleeding. Consumes regular diet.  Not currently on any iron therapy. Most recent CBC from 07/20/2024 shows hemoglobin 13.3 g/dL, iron 121, iron saturation 20%, ferritin 6.  Remainder of CBC was normal.  CMP norml and TSH were normal. She is up to date on PAP    Discussion/Plan   Begin VitronC twice a day, if unable to tolerate may reduce dose to once daily or once 3x per week. Adequate hydration and fiber intake. Can use in combination with stool softener if constipation occurs.   Repeat CBC, Iron panel to monitor response   Follow up with GYN for management of menorrhagia   Repeat pelvic ultrasound for complaints of night sweats and previous findings of complex cyst on prior study.  RTC 3m    Patient voiced agreement and understanding to the above.   Patient knows to call the Hematology/Oncology office with any questions and concerns regarding  the above.    Barrier(s) to care: None.    Rubina Dave PA-C  Medical Oncology/Hematology  [facility]    Subjective     Chief Complaint   Patient presents with    Consult     Iron deficiency       Referring provider    Sammy Flynn [facility], DO  [address]  [place] [id number]    History of present illness: 29-year-old female with past medical history of PCOS, endometriosis, migraines, GERD, PCOS, anxiety, arthralgia, +TEO who has been referred by endocrinologist or evaluation and management of iron deficiency.    Most recent CBC from 07/20/2024 shows hemoglobin 13.3 g/dL, iron 121, iron saturation 20%, ferritin 6.  Remainder of CBC was normal.  CMP norml and TSH were normal.    She was on iron therapy many years ago for fatigue. Not on any iron supplements currently. Admits to +fatigue, dizziness, lightheadedness, shortness of breath, and achy joints. Occasional chest pain and palpitations, none today. A couple months ago she believes she passed blood clots with BM. She had EGD/colonoscopy 11/2023 that was unrevealing.     She has long standing history of painful menorrhagia, had syncope from this previously. Irregular menstrual pattern. Currently bleeding for 20 days. Typically during menstrual cycle she uses super tampon or regular pad every 1 hour.  She was on contraception with copper IUD which was switched to Mirena on 06/27/2024.  Her last pelvic ultrasound from 11/2023 showed bilateral peripherally oriented ovarian follicles could relate to polycystic ovaries. Multiseptated complex 4 x 2.7 x 2.8 cm structure within the left ovary or adjacent to the left ovary without discrete nodular component..    She is on gluten free/antiinflammatory diet. Consumes red meat. Not on PPI.    No tobacco use or drug use. Drinks socially.     Review of Systems   Constitutional:  Positive for fatigue. Negative for activity change (decreased), fever and unexpected weight change.         "Night sweats over past 1-2 years. Reports she is \"drenched in sweat.\" Worse over past 8 months. Does not sleep with clothes on anymore due to sweating.   Respiratory:  Positive for shortness of breath.    Genitourinary:  Positive for vaginal bleeding. Negative for hematuria.   Musculoskeletal:  Positive for arthralgias.   Skin:  Negative for rash.        No breast changes    Neurological:  Positive for dizziness and light-headedness.   Hematological:  Positive for adenopathy (intermittent jaw and groin).   All other systems reviewed and are negative.      Past Medical History:   Diagnosis Date    Abdominal pain     with nausea    ADHD     Anemia     Anxiety     Chlamydia     Diarrhea     GERD (gastroesophageal reflux disease)     Migraine     Mononucleosis     Ovarian cyst     PONV (postoperative nausea and vomiting)      Past Surgical History:   Procedure Laterality Date    SINUS SURGERY      WISDOM TOOTH EXTRACTION       Family History   Problem Relation Age of Onset    Hypertension Mother     Hypertension Father     No Known Problems Brother     Hypertension Maternal Grandmother     Cancer Maternal Grandfather         bladder    Lupus Paternal Grandmother     Diabetes Paternal Grandmother     Alzheimer's disease Paternal Grandfather      Social History     Socioeconomic History    Marital status: Single     Spouse name: None    Number of children: None    Years of education: None    Highest education level: None   Occupational History    None   Tobacco Use    Smoking status: Some Days     Current packs/day: 0.25     Average packs/day: 0.3 packs/day for 2.6 years (0.6 ttl pk-yrs)     Types: Cigarettes     Start date: 2022     Passive exposure: Past    Smokeless tobacco: Never   Vaping Use    Vaping status: Never Used   Substance and Sexual Activity    Alcohol use: Yes     Alcohol/week: 1.0 standard drink of alcohol     Types: 1 Glasses of wine per week     Comment: socially weekly    Drug use: Never    Sexual " activity: Yes     Partners: Male     Birth control/protection: I.U.D.     Comment: history STD-chlamydia   Other Topics Concern    None   Social History Narrative    Lives in house with BF            Currently in college        Not working while in school     Social Determinants of Health     Financial Resource Strain: Not on file   Food Insecurity: No Food Insecurity (4/8/2024)    Hunger Vital Sign     Worried About Running Out of Food in the Last Year: Never true     Ran Out of Food in the Last Year: Never true   Transportation Needs: No Transportation Needs (4/8/2024)    PRAPARE - Transportation     Lack of Transportation (Medical): No     Lack of Transportation (Non-Medical): No   Physical Activity: Not on file   Stress: Not on file   Social Connections: Unknown (6/18/2024)    Received from [facility]     How often do you feel lonely or isolated from those around you? (Adult - for ages 18 years and over): Not on file   Intimate Partner Violence: Not on file   Housing Stability: Low Risk  (4/8/2024)    Housing Stability Vital Sign     Unable to Pay for Housing in the Last Year: No     Number of Times Moved in the Last Year: 1     Homeless in the Last Year: No         Current Outpatient Medications:     fluticasone (FLONASE) 50 mcg/act nasal spray, 1 spray into each nostril daily, Disp: 9.9 mL, Rfl: 0    Magnesium Gluconate 550 MG TABS, Take 400 mg by mouth 2 (two) times a day, Disp: , Rfl:     Multiple Vitamin (MULTIVITAMIN ADULT PO), Take by mouth, Disp: , Rfl:     VITAMIN D PO, Take by mouth, Disp: , Rfl:     dicyclomine (BENTYL) 10 mg capsule, Take 1 capsule (10 mg total) by mouth 3 (three) times a day as needed (abdominal cramping) (Patient not taking: Reported on 7/9/2024), Disp: 40 capsule, Rfl: 5    FLUoxetine (PROzac) 20 MG tablet, Take 1 tablet (20 mg total) by mouth daily (Patient not taking: Reported on 7/9/2024), Disp: 30 tablet, Rfl: 1    gabapentin (NEURONTIN) 100 mg capsule,  "Take 1 capsule (100 mg total) by mouth 3 (three) times a day for 14 days (Patient not taking: Reported on 4/18/2024), Disp: 42 capsule, Rfl: 0    methylPREDNISolone 4 MG tablet therapy pack, Use as directed on package (Patient not taking: Reported on 7/23/2024), Disp: 21 tablet, Rfl: 0    NON FORMULARY, Citlaly Villegas (Patient not taking: Reported on 7/23/2024), Disp: , Rfl:     Restasis 0.05 % ophthalmic emulsion, , Disp: , Rfl:   Allergies   Allergen Reactions    Amoxicillin-Pot Clavulanate Facial Swelling     Other Reaction(s): Facial Swelling       Objective   /70   Pulse 64   Temp 98.6 °F (37 °C) (Temporal)   Resp 16   Ht 5' 5\" (1.651 m)   Wt 59.4 kg (131 lb)   LMP 07/12/2024 (Approximate)   SpO2 100%   BMI 21.80 kg/m²   Physical Exam  Vitals reviewed.   HENT:      Head: Normocephalic.   Cardiovascular:      Rate and Rhythm: Normal rate and regular rhythm.   Pulmonary:      Effort: Pulmonary effort is normal.      Breath sounds: Normal breath sounds.   Abdominal:      Palpations: Abdomen is soft.      Tenderness: There is no abdominal tenderness.   Musculoskeletal:      Cervical back: Neck supple.   Lymphadenopathy:      Cervical: No cervical adenopathy.      Upper Body:      Right upper body: No supraclavicular or axillary adenopathy.      Left upper body: No supraclavicular or axillary adenopathy.   Skin:     Findings: No rash.   Neurological:      Mental Status: She is alert.         Result Review  Labs:  Consult on 07/23/2024   Component Date Value Ref Range Status    WBC 07/24/2024 5.89  4.31 - 10.16 Thousand/uL Final    RBC 07/24/2024 4.69  3.81 - 5.12 Million/uL Final    Hemoglobin 07/24/2024 13.3  11.5 - 15.4 g/dL Final    Hematocrit 07/24/2024 40.9  34.8 - 46.1 % Final    MCV 07/24/2024 87  82 - 98 fL Final    MCH 07/24/2024 28.4  26.8 - 34.3 pg Final    MCHC 07/24/2024 32.5  31.4 - 37.4 g/dL Final    RDW 07/24/2024 13.0  11.6 - 15.1 % Final    MPV 07/24/2024 9.9  8.9 - 12.7 fL Final    " Platelets 07/24/2024 294  149 - 390 Thousands/uL Final    nRBC 07/24/2024 0  /100 WBCs Final    Segmented % 07/24/2024 62  43 - 75 % Final    Immature Grans % 07/24/2024 0  0 - 2 % Final    Lymphocytes % 07/24/2024 30  14 - 44 % Final    Monocytes % 07/24/2024 7  4 - 12 % Final    Eosinophils Relative 07/24/2024 1  0 - 6 % Final    Basophils Relative 07/24/2024 0  0 - 1 % Final    Absolute Neutrophils 07/24/2024 3.63  1.85 - 7.62 Thousands/µL Final    Absolute Immature Grans 07/24/2024 0.02  0.00 - 0.20 Thousand/uL Final    Absolute Lymphocytes 07/24/2024 1.78  0.60 - 4.47 Thousands/µL Final    Absolute Monocytes 07/24/2024 0.38  0.17 - 1.22 Thousand/µL Final    Eosinophils Absolute 07/24/2024 0.06  0.00 - 0.61 Thousand/µL Final    Basophils Absolute 07/24/2024 0.02  0.00 - 0.10 Thousands/µL Final    TSH 3RD GENERATON 07/24/2024 2.498  0.450 - 4.500 uIU/mL Final    The recommended reference ranges for TSH during pregnancy are as follows:   First trimester 0.100 to 2.500 uIU/mL   Second trimester  0.200 to 3.000 uIU/mL   Third trimester 0.300 to 3.000 uIU/m    Note: Normal ranges may not apply to patients who are transgender, non-binary, or whose legal sex, sex at birth, and gender identity differ.  Adult TSH (3rd generation) reference range follows the recommended guidelines of the American Thyroid Association, January, 2020.    Free T4 07/24/2024 0.94  0.61 - 1.12 ng/dL Final    Specimens with biotin concentrations > 10 ng/mL can lead to significant (> 10%) positive bias in result.    Sodium 07/24/2024 140  135 - 147 mmol/L Final    Potassium 07/24/2024 4.2  3.5 - 5.3 mmol/L Final    Chloride 07/24/2024 103  96 - 108 mmol/L Final    CO2 07/24/2024 28  21 - 32 mmol/L Final    ANION GAP 07/24/2024 9  4 - 13 mmol/L Final    BUN 07/24/2024 7  5 - 25 mg/dL Final    Creatinine 07/24/2024 0.83  0.60 - 1.30 mg/dL Final    Standardized to IDMS reference method    Glucose, Fasting 07/24/2024 91  65 - 99 mg/dL Final     Calcium 07/24/2024 9.4  8.4 - 10.2 mg/dL Final    AST 07/24/2024 10 (L)  13 - 39 U/L Final    ALT 07/24/2024 8  7 - 52 U/L Final    Specimen collection should occur prior to Sulfasalazine administration due to the potential for falsely depressed results.     Alkaline Phosphatase 07/24/2024 51  34 - 104 U/L Final    Total Protein 07/24/2024 7.3  6.4 - 8.4 g/dL Final    Albumin 07/24/2024 4.4  3.5 - 5.0 g/dL Final    Total Bilirubin 07/24/2024 0.69  0.20 - 1.00 mg/dL Final    Use of this assay is not recommended for patients undergoing treatment with eltrombopag due to the potential for falsely elevated results.  N-acetyl-p-benzoquinone imine (metabolite of Acetaminophen) will generate erroneously low results in samples for patients that have taken an overdose of Acetaminophen.    eGFR 07/24/2024 96  ml/min/1.73sq m Final    Vit D, 25-Hydroxy 07/24/2024 49.1  30.0 - 100.0 ng/mL Final    Vitamin D guidelines established by Clinical Guidelines Subcommittee  of the Endocrine Society Task Force, 2011    Deficiency <20ng/ml   Insufficiency 20-30ng/ml   Sufficient  ng/ml     Iron Saturation 07/24/2024 28  15 - 50 % Final    TIBC 07/24/2024 430  250 - 450 ug/dL Final    Iron 07/24/2024 121  50 - 212 ug/dL Final    Patients treated with metal-binding drugs (ie. Deferoxamine) may have depressed iron values.    UIBC 07/24/2024 309  155 - 355 ug/dL Final    Ferritin 07/24/2024 6 (L)  11 - 307 ng/mL Final       Imaging:   I have reviewed all relevant imaging  Please note:  This report has been generated by a voice recognition software system. Therefore there may be syntax, spelling, and/or grammatical errors. Please call if you have any questions.

## 2024-08-15 ENCOUNTER — HOSPITAL ENCOUNTER (OUTPATIENT)
Dept: ULTRASOUND IMAGING | Facility: HOSPITAL | Age: 29
End: 2024-08-15
Payer: COMMERCIAL

## 2024-08-15 DIAGNOSIS — N83.202 CYST OF LEFT OVARY: ICD-10-CM

## 2024-08-15 DIAGNOSIS — R61 NIGHT SWEATS: ICD-10-CM

## 2024-08-15 PROCEDURE — 76856 US EXAM PELVIC COMPLETE: CPT

## 2024-08-15 PROCEDURE — 76830 TRANSVAGINAL US NON-OB: CPT

## 2024-08-27 ENCOUNTER — OFFICE VISIT (OUTPATIENT)
Dept: GASTROENTEROLOGY | Facility: CLINIC | Age: 29
End: 2024-08-27
Payer: COMMERCIAL

## 2024-08-27 VITALS
BODY MASS INDEX: 21.96 KG/M2 | SYSTOLIC BLOOD PRESSURE: 125 MMHG | DIASTOLIC BLOOD PRESSURE: 86 MMHG | HEIGHT: 65 IN | HEART RATE: 68 BPM | WEIGHT: 131.8 LBS

## 2024-08-27 DIAGNOSIS — R10.13 POSTPRANDIAL EPIGASTRIC PAIN: Primary | ICD-10-CM

## 2024-08-27 DIAGNOSIS — R11.2 NAUSEA AND VOMITING, UNSPECIFIED VOMITING TYPE: ICD-10-CM

## 2024-08-27 PROCEDURE — 99214 OFFICE O/P EST MOD 30 MIN: CPT | Performed by: PHYSICIAN ASSISTANT

## 2024-08-27 NOTE — PROGRESS NOTES
Follow-up Note -  Gastroenterology Specialists  Maryse Reid 1995 29 y.o. female         ASSESSMENT & PLAN:    Postprandial epigastric pain  Postprandial upper abdominal pain, nausea and vomiting, worsened with fatty foods, differential includes biliary disease, delayed gastric emptying, functional dyspepsia, less likely peptic ulcer disease/gastritis that she had EGD relatively recently    -Will check right upper quadrant ultrasound    -Check gastric emptying study    -Advised patient about minimizing gas producing foods, was given information on FODMAP diet    -Patient has Bentyl available, advised her that she can use this on an as-needed basis for abdominal cramping and diarrhea/stool urgency, would recommend avoiding this during times of constipation    -Can also use IBgard as an over-the-counter supplement to help with possible bowel spasm    -If right upper quadrant ultrasound is unremarkable we can consider HIDA scan to check for evidence of biliary dyskinesia if her symptoms persist    -Follow-up in a few months      Reason: Follow-up; postprandial epigastric pain    HPI: 29-year-old female with history of anxiety/depression, PCOS, endometriosis, dysmenorrhea/menorrhagia, iron deficiency anemia who presents for follow-up, was seen in our office last year in November for evaluation of rectal bleeding, left lower quadrant pain, iron deficiency anemia, GERD and diarrhea.  She underwent EGD and colonoscopy 11/16/2023 noting grossly normal appearance to the upper GI tract, colon and TI, no evidence of MC/celiac disease/H. pylori on biopsies of these regions.    Recently had normal TSH/T4, had seen endocrinologist recently as she had been having other issues including chronic fatigue, night sweats, and had been seen in ER in April with dysarthria and tremors.    At this time the patient says she still has ongoing issues which she thinks are gallbladder attacks, she does not remember ever having had a  right upper quadrant ultrasound though but notes that she experiences early satiety, frequent nausea and often times with vomiting which tend to happen postprandially.  She can wake up nauseous sometimes however.  She endorses intermittent squeezing upper abdominal pain which also tends to be postprandial, and this can often radiate to the upper back and right shoulder and has also radiated to the jaw.  She has bowel movements every day but with variable frequency, 1-6 times a day, always loose, sometimes fatty in appearance but not consistently.  She takes Pepcid on an as-needed basis and will use Tums on an as-needed basis about once or twice a week, she says she tries to avoid medications where possible.  Her medications list includes dicyclomine but she has not been using this either.  Denies use of opioids, no known history of diabetes.    REVIEW OF SYSTEMS:      CONSTITUTIONAL: Denies any fever, chills, or rigors. Good appetite, and no recent weight loss.  HEENT: No earache or tinnitus. Denies hearing loss or visual disturbances.  CARDIOVASCULAR: No chest pain or palpitations.   RESPIRATORY: Denies any cough, hemoptysis, shortness of breath or dyspnea on exertion.  GASTROINTESTINAL: As noted in the History of Present Illness.   GENITOURINARY: No problems with urination. Denies any hematuria or dysuria.  NEUROLOGIC: No dizziness or vertigo, denies headaches.   MUSCULOSKELETAL: Denies any muscle or joint pain.   SKIN: Denies skin rashes or itching.   ENDOCRINE: Denies excessive thirst. Denies intolerance to heat or cold.  PSYCHOSOCIAL: Denies depression or anxiety. Denies any recent memory loss.     Past Medical History:   Diagnosis Date    Abdominal pain     with nausea    ADHD     Anemia     Anxiety     Chlamydia     Diarrhea     GERD (gastroesophageal reflux disease)     Migraine     Mononucleosis     Ovarian cyst     PONV (postoperative nausea and vomiting)       Past Surgical History:   Procedure Laterality  Date    COLONOSCOPY      SINUS SURGERY      UPPER GASTROINTESTINAL ENDOSCOPY      WISDOM TOOTH EXTRACTION       Social History     Socioeconomic History    Marital status: Single     Spouse name: Not on file    Number of children: Not on file    Years of education: Not on file    Highest education level: Not on file   Occupational History    Not on file   Tobacco Use    Smoking status: Some Days     Current packs/day: 0.25     Average packs/day: 0.3 packs/day for 2.7 years (0.7 ttl pk-yrs)     Types: Cigarettes     Start date: 2022     Passive exposure: Past    Smokeless tobacco: Never   Vaping Use    Vaping status: Never Used   Substance and Sexual Activity    Alcohol use: Yes     Alcohol/week: 1.0 standard drink of alcohol     Types: 1 Glasses of wine per week     Comment: socially weekly    Drug use: Never    Sexual activity: Yes     Partners: Male     Birth control/protection: I.U.D.     Comment: history STD-chlamydia   Other Topics Concern    Not on file   Social History Narrative    Lives in house with BF            Currently in college        Not working while in school     Social Determinants of Health     Financial Resource Strain: Not on file   Food Insecurity: No Food Insecurity (4/8/2024)    Hunger Vital Sign     Worried About Running Out of Food in the Last Year: Never true     Ran Out of Food in the Last Year: Never true   Transportation Needs: No Transportation Needs (4/8/2024)    PRAPARE - Transportation     Lack of Transportation (Medical): No     Lack of Transportation (Non-Medical): No   Physical Activity: Not on file   Stress: Not on file   Social Connections: Unknown (6/18/2024)    Received from [facility]    Social Connections     How often do you feel lonely or isolated from those around you? (Adult - for ages 18 years and over): Not on file   Intimate Partner Violence: Not on file   Housing Stability: Low Risk  (4/8/2024)    Housing Stability Vital Sign     Unable to Pay for Housing in the  "Last Year: No     Number of Times Moved in the Last Year: 1     Homeless in the Last Year: No     Family History   Problem Relation Age of Onset    Hypertension Mother     Hypertension Father     No Known Problems Brother     Hypertension Maternal Grandmother     Cancer Maternal Grandfather         bladder    Lupus Paternal Grandmother     Diabetes Paternal Grandmother     Alzheimer's disease Paternal Grandfather      Amoxicillin-pot clavulanate  Current Outpatient Medications   Medication Sig Dispense Refill    fluticasone (FLONASE) 50 mcg/act nasal spray 1 spray into each nostril daily 9.9 mL 0    Iron-Vitamin C (Vitron-C)  MG TABS Take 1 tablet by mouth 2 (two) times a day 60 tablet 2    Magnesium Gluconate 550 MG TABS Take 400 mg by mouth 2 (two) times a day      Multiple Vitamin (MULTIVITAMIN ADULT PO) Take by mouth      dicyclomine (BENTYL) 10 mg capsule Take 1 capsule (10 mg total) by mouth 3 (three) times a day as needed (abdominal cramping) (Patient not taking: Reported on 7/9/2024) 40 capsule 5    FLUoxetine (PROzac) 20 MG tablet Take 1 tablet (20 mg total) by mouth daily (Patient not taking: Reported on 7/9/2024) 30 tablet 1    gabapentin (NEURONTIN) 100 mg capsule Take 1 capsule (100 mg total) by mouth 3 (three) times a day for 14 days (Patient not taking: Reported on 4/18/2024) 42 capsule 0    methylPREDNISolone 4 MG tablet therapy pack Use as directed on package (Patient not taking: Reported on 7/23/2024) 21 tablet 0    NON FORMULARY Citlaly Villegas (Patient not taking: Reported on 7/23/2024)      Restasis 0.05 % ophthalmic emulsion  (Patient not taking: Reported on 7/9/2024)      VITAMIN D PO Take by mouth (Patient not taking: Reported on 8/27/2024)       No current facility-administered medications for this visit.       Blood pressure 125/86, pulse 68, height 5' 5\" (1.651 m), weight 59.8 kg (131 lb 12.8 oz), last menstrual period 07/12/2024.    PHYSICAL EXAM:      General Appearance:   Alert, " cooperative, no distress, appears stated age    HEENT:   Normocephalic, atraumatic, anicteric.     Neck:  Supple, symmetrical, trachea midline, no adenopathy;    thyroid: no enlargement/tenderness/nodules; no carotid  bruit or JVD    Lungs:   Clear to auscultation bilaterally; no rales, rhonchi or wheezing; respirations unlabored    Heart::   S1 and S2 normal; regular rate and rhythm; no murmur, rub, or gallop.   Abdomen:   Soft, non-tender, non-distended; normal bowel sounds; no masses, no organomegaly    Extremities: No edema, erythema, wounds, rashes   Rectal:   Deferred                      Lab Results   Component Value Date    WBC 5.89 07/24/2024    HGB 13.3 07/24/2024    HCT 40.9 07/24/2024    MCV 87 07/24/2024     07/24/2024     Lab Results   Component Value Date    CALCIUM 9.4 07/24/2024    K 4.2 07/24/2024    CO2 28 07/24/2024     07/24/2024    BUN 7 07/24/2024    CREATININE 0.83 07/24/2024     Lab Results   Component Value Date    ALT 8 07/24/2024    AST 10 (L) 07/24/2024    ALKPHOS 51 07/24/2024     Lab Results   Component Value Date    INR 1.15 04/08/2024    INR 1.10 11/04/2023    INR 1.10 06/18/2021    PROTIME 14.6 (H) 04/08/2024    PROTIME 14.8 (H) 11/04/2023    PROTIME 14.1 06/18/2021       US pelvis complete w transvaginal    Result Date: 8/19/2024  Impression: 3.5 cm left ovarian complex cyst most consistent with an endometrioma. If not surgically removed, recommend follow-up ultrasound in 12 months. Polycystic morphology the right ovary. Normal uterus with IUD centrally located within the endometrial canal. The study was marked in EPIC for significant notification. Workstation performed: [id number]

## 2024-08-27 NOTE — ASSESSMENT & PLAN NOTE
Postprandial upper abdominal pain, nausea and vomiting, worsened with fatty foods, differential includes biliary disease, delayed gastric emptying, functional dyspepsia, less likely peptic ulcer disease/gastritis that she had EGD relatively recently    -Will check right upper quadrant ultrasound    -Check gastric emptying study    -Advised patient about minimizing gas producing foods, was given information on FODMAP diet    -Patient has Bentyl available, advised her that she can use this on an as-needed basis for abdominal cramping and diarrhea/stool urgency, would recommend avoiding this during times of constipation    -Can also use IBgard as an over-the-counter supplement to help with possible bowel spasm    -If right upper quadrant ultrasound is unremarkable we can consider HIDA scan to check for evidence of biliary dyskinesia if her symptoms persist    -Follow-up in a few months

## 2024-08-30 ENCOUNTER — HOSPITAL ENCOUNTER (OUTPATIENT)
Dept: ULTRASOUND IMAGING | Facility: HOSPITAL | Age: 29
End: 2024-08-30
Payer: COMMERCIAL

## 2024-08-30 DIAGNOSIS — R10.13 POSTPRANDIAL EPIGASTRIC PAIN: ICD-10-CM

## 2024-08-30 DIAGNOSIS — R11.2 NAUSEA AND VOMITING, UNSPECIFIED VOMITING TYPE: ICD-10-CM

## 2024-08-30 PROCEDURE — 76705 ECHO EXAM OF ABDOMEN: CPT

## 2024-09-01 ENCOUNTER — APPOINTMENT (EMERGENCY)
Dept: CT IMAGING | Facility: HOSPITAL | Age: 29
End: 2024-09-01
Payer: COMMERCIAL

## 2024-09-01 ENCOUNTER — OFFICE VISIT (OUTPATIENT)
Dept: URGENT CARE | Facility: CLINIC | Age: 29
End: 2024-09-01
Payer: COMMERCIAL

## 2024-09-01 ENCOUNTER — HOSPITAL ENCOUNTER (EMERGENCY)
Facility: HOSPITAL | Age: 29
Discharge: HOME | End: 2024-09-01
Attending: EMERGENCY MEDICINE
Payer: COMMERCIAL

## 2024-09-01 VITALS
DIASTOLIC BLOOD PRESSURE: 80 MMHG | TEMPERATURE: 98.5 F | RESPIRATION RATE: 18 BRPM | SYSTOLIC BLOOD PRESSURE: 130 MMHG | OXYGEN SATURATION: 100 % | HEART RATE: 110 BPM

## 2024-09-01 VITALS
HEART RATE: 78 BPM | RESPIRATION RATE: 16 BRPM | TEMPERATURE: 98.7 F | DIASTOLIC BLOOD PRESSURE: 88 MMHG | WEIGHT: 131 LBS | OXYGEN SATURATION: 100 % | SYSTOLIC BLOOD PRESSURE: 129 MMHG | BODY MASS INDEX: 21.8 KG/M2

## 2024-09-01 DIAGNOSIS — J06.9 URI WITH COUGH AND CONGESTION: Primary | ICD-10-CM

## 2024-09-01 DIAGNOSIS — M54.6 THORACIC SPINE PAIN: ICD-10-CM

## 2024-09-01 DIAGNOSIS — N39.0 UTI (URINARY TRACT INFECTION): Primary | ICD-10-CM

## 2024-09-01 DIAGNOSIS — M51.27 HERNIATION OF INTERVERTEBRAL DISC BETWEEN L5 AND S1: ICD-10-CM

## 2024-09-01 LAB
ALBUMIN SERPL BCG-MCNC: 4.9 G/DL (ref 3.5–5)
ALP SERPL-CCNC: 56 U/L (ref 34–104)
ALT SERPL W P-5'-P-CCNC: 8 U/L (ref 7–52)
ANION GAP SERPL CALCULATED.3IONS-SCNC: 8 MMOL/L (ref 4–13)
AST SERPL W P-5'-P-CCNC: 12 U/L (ref 13–39)
BACTERIA UR QL AUTO: ABNORMAL /HPF
BASOPHILS # BLD AUTO: 0.04 THOUSANDS/ÂΜL (ref 0–0.1)
BASOPHILS NFR BLD AUTO: 1 % (ref 0–1)
BILIRUB SERPL-MCNC: 0.52 MG/DL (ref 0.2–1)
BILIRUB UR QL STRIP: NEGATIVE
BUN SERPL-MCNC: 11 MG/DL (ref 5–25)
CALCIUM SERPL-MCNC: 9.4 MG/DL (ref 8.4–10.2)
CHLORIDE SERPL-SCNC: 105 MMOL/L (ref 96–108)
CLARITY UR: ABNORMAL
CO2 SERPL-SCNC: 27 MMOL/L (ref 21–32)
COLOR UR: YELLOW
CREAT SERPL-MCNC: 0.87 MG/DL (ref 0.6–1.3)
EOSINOPHIL # BLD AUTO: 0.08 THOUSAND/ÂΜL (ref 0–0.61)
EOSINOPHIL NFR BLD AUTO: 1 % (ref 0–6)
ERYTHROCYTE [DISTWIDTH] IN BLOOD BY AUTOMATED COUNT: 13.7 % (ref 11.6–15.1)
EXT PREGNANCY TEST URINE: NEGATIVE
EXT. CONTROL: NORMAL
GFR SERPL CREATININE-BSD FRML MDRD: 90 ML/MIN/1.73SQ M
GLUCOSE SERPL-MCNC: 83 MG/DL (ref 65–140)
GLUCOSE UR STRIP-MCNC: NEGATIVE MG/DL
HCT VFR BLD AUTO: 38.9 % (ref 34.8–46.1)
HGB BLD-MCNC: 12.7 G/DL (ref 11.5–15.4)
HGB UR QL STRIP.AUTO: ABNORMAL
IMM GRANULOCYTES # BLD AUTO: 0.01 THOUSAND/UL (ref 0–0.2)
IMM GRANULOCYTES NFR BLD AUTO: 0 % (ref 0–2)
KETONES UR STRIP-MCNC: NEGATIVE MG/DL
LEUKOCYTE ESTERASE UR QL STRIP: NEGATIVE
LIPASE SERPL-CCNC: 26 U/L (ref 11–82)
LYMPHOCYTES # BLD AUTO: 2.67 THOUSANDS/ÂΜL (ref 0.6–4.47)
LYMPHOCYTES NFR BLD AUTO: 35 % (ref 14–44)
MCH RBC QN AUTO: 29.1 PG (ref 26.8–34.3)
MCHC RBC AUTO-ENTMCNC: 32.6 G/DL (ref 31.4–37.4)
MCV RBC AUTO: 89 FL (ref 82–98)
MONOCYTES # BLD AUTO: 0.62 THOUSAND/ÂΜL (ref 0.17–1.22)
MONOCYTES NFR BLD AUTO: 8 % (ref 4–12)
NEUTROPHILS # BLD AUTO: 4.21 THOUSANDS/ÂΜL (ref 1.85–7.62)
NEUTS SEG NFR BLD AUTO: 55 % (ref 43–75)
NITRITE UR QL STRIP: NEGATIVE
NON-SQ EPI CELLS URNS QL MICRO: ABNORMAL /HPF
NRBC BLD AUTO-RTO: 0 /100 WBCS
PH UR STRIP.AUTO: 6.5 [PH]
PLATELET # BLD AUTO: 277 THOUSANDS/UL (ref 149–390)
PMV BLD AUTO: 9.5 FL (ref 8.9–12.7)
POTASSIUM SERPL-SCNC: 3.1 MMOL/L (ref 3.5–5.3)
PROT SERPL-MCNC: 7.4 G/DL (ref 6.4–8.4)
PROT UR STRIP-MCNC: NEGATIVE MG/DL
RBC # BLD AUTO: 4.37 MILLION/UL (ref 3.81–5.12)
RBC #/AREA URNS AUTO: ABNORMAL /HPF
SODIUM SERPL-SCNC: 140 MMOL/L (ref 135–147)
SP GR UR STRIP.AUTO: 1.01
UROBILINOGEN UR QL STRIP.AUTO: 0.2 E.U./DL
WBC # BLD AUTO: 7.63 THOUSAND/UL (ref 4.31–10.16)
WBC #/AREA URNS AUTO: ABNORMAL /HPF

## 2024-09-01 PROCEDURE — 81001 URINALYSIS AUTO W/SCOPE: CPT | Performed by: EMERGENCY MEDICINE

## 2024-09-01 PROCEDURE — 81025 URINE PREGNANCY TEST: CPT | Performed by: EMERGENCY MEDICINE

## 2024-09-01 PROCEDURE — 96374 THER/PROPH/DIAG INJ IV PUSH: CPT

## 2024-09-01 PROCEDURE — 74177 CT ABD & PELVIS W/CONTRAST: CPT

## 2024-09-01 PROCEDURE — 83690 ASSAY OF LIPASE: CPT | Performed by: EMERGENCY MEDICINE

## 2024-09-01 PROCEDURE — 81003 URINALYSIS AUTO W/O SCOPE: CPT | Performed by: EMERGENCY MEDICINE

## 2024-09-01 PROCEDURE — 80053 COMPREHEN METABOLIC PANEL: CPT | Performed by: EMERGENCY MEDICINE

## 2024-09-01 PROCEDURE — 99284 EMERGENCY DEPT VISIT MOD MDM: CPT

## 2024-09-01 PROCEDURE — 36415 COLL VENOUS BLD VENIPUNCTURE: CPT | Performed by: EMERGENCY MEDICINE

## 2024-09-01 PROCEDURE — 85025 COMPLETE CBC W/AUTO DIFF WBC: CPT | Performed by: EMERGENCY MEDICINE

## 2024-09-01 PROCEDURE — 99214 OFFICE O/P EST MOD 30 MIN: CPT | Performed by: NURSE PRACTITIONER

## 2024-09-01 RX ORDER — SULFAMETHOXAZOLE/TRIMETHOPRIM 800-160 MG
1 TABLET ORAL 2 TIMES DAILY
Qty: 14 TABLET | Refills: 0 | Status: SHIPPED | OUTPATIENT
Start: 2024-09-01 | End: 2024-09-08

## 2024-09-01 RX ORDER — PREDNISONE 10 MG/1
TABLET ORAL
Qty: 24 TABLET | Refills: 0 | Status: SHIPPED | OUTPATIENT
Start: 2024-09-01

## 2024-09-01 RX ORDER — KETOROLAC TROMETHAMINE 30 MG/ML
15 INJECTION, SOLUTION INTRAMUSCULAR; INTRAVENOUS ONCE
Status: COMPLETED | OUTPATIENT
Start: 2024-09-01 | End: 2024-09-01

## 2024-09-01 RX ORDER — CYCLOBENZAPRINE HCL 10 MG
10 TABLET ORAL 2 TIMES DAILY PRN
Qty: 20 TABLET | Refills: 0 | Status: SHIPPED | OUTPATIENT
Start: 2024-09-01

## 2024-09-01 RX ORDER — IBUPROFEN 800 MG/1
800 TABLET, FILM COATED ORAL EVERY 8 HOURS PRN
Qty: 21 TABLET | Refills: 0 | Status: SHIPPED | OUTPATIENT
Start: 2024-09-01

## 2024-09-01 RX ORDER — POTASSIUM CHLORIDE 1500 MG/1
40 TABLET, EXTENDED RELEASE ORAL ONCE
Status: COMPLETED | OUTPATIENT
Start: 2024-09-01 | End: 2024-09-01

## 2024-09-01 RX ORDER — SULFAMETHOXAZOLE/TRIMETHOPRIM 800-160 MG
1 TABLET ORAL ONCE
Status: COMPLETED | OUTPATIENT
Start: 2024-09-01 | End: 2024-09-01

## 2024-09-01 RX ADMIN — IOHEXOL 100 ML: 350 INJECTION, SOLUTION INTRAVENOUS at 20:56

## 2024-09-01 RX ADMIN — KETOROLAC TROMETHAMINE 15 MG: 30 INJECTION, SOLUTION INTRAMUSCULAR at 20:20

## 2024-09-01 RX ADMIN — POTASSIUM CHLORIDE 40 MEQ: 1500 TABLET, EXTENDED RELEASE ORAL at 21:16

## 2024-09-01 RX ADMIN — SULFAMETHOXAZOLE AND TRIMETHOPRIM 1 TABLET: 800; 160 TABLET ORAL at 21:51

## 2024-09-01 NOTE — PROGRESS NOTES
"  [facility]        NAME: Maryse Reid is a 29 y.o. female  : 1995    MRN: [id number]  DATE: 2024  TIME: 12:21 PM    Assessment and Plan   URI with cough and congestion [J06.9]  1. URI with cough and congestion  predniSONE 10 mg tablet      2. Thoracic spine pain      lump            Patient Instructions       Follow up with PCP in 3-5 days.  Proceed to  ER if symptoms worsen.    If tests have been performed at [facility], our office will contact you with results if changes need to be made to the care plan discussed with you at the visit.  You can [facility] on [facility].  You have been prescribed prednisone. Take with food. Do NOT take any NSAID products (motrin, ibuprofen, aleve, advil) while taking this medication.  You may take tylenol.   You may take claritin for post nasal drip.  Stop smoking    You are to see your PCP for the lump on the spine that is tender.  Take only tylenol while taking the prednisone for pain.            Chief Complaint     Chief Complaint   Patient presents with    Cough     Pt c/o cough with congestion that has been going on for the past 3 weeks. Pt also c/o lump to center of mid spine that is painful to touch for the past 3 days.         History of Present Illness       This is a 29 year old female who states has had a cough x 3 weeks. She states she had cold symptoms 3 weeks ago, tested negative for covid but still has the cough. She has been taking OTC w/o relief.  She denies fevers, chills, n/v/d, pregnancy.  She states occasionally smokes.   She also c/o a \"lump on her spine\" x 1 week.  She states motrin and tylenol do not help the pain.  She has not seen her PCP for either. Denies spine injury.      Cough        Review of Systems   Review of Systems   Constitutional: Negative.    HENT:  Positive for congestion.    Eyes: Negative.    Respiratory:  Positive for cough.    Cardiovascular: Negative.    Gastrointestinal: Negative. "    Endocrine: Negative.    Genitourinary: Negative.    Musculoskeletal:  Positive for back pain.   Skin: Negative.    Allergic/Immunologic: Negative.    Neurological: Negative.    Hematological: Negative.    Psychiatric/Behavioral: Negative.           Current Medications       Current Outpatient Medications:     Iron-Vitamin C (Vitron-C)  MG TABS, Take 1 tablet by mouth 2 (two) times a day, Disp: 60 tablet, Rfl: 2    Multiple Vitamin (MULTIVITAMIN ADULT PO), Take by mouth, Disp: , Rfl:     predniSONE 10 mg tablet, Take 5 tabs po x 2 days; 4 tabs po x 2 days; 3 tabs po x 1 day; 2 tabs po x 1 day. 1 tab po x 1 day., Disp: 24 tablet, Rfl: 0    dicyclomine (BENTYL) 10 mg capsule, Take 1 capsule (10 mg total) by mouth 3 (three) times a day as needed (abdominal cramping) (Patient not taking: Reported on 7/9/2024), Disp: 40 capsule, Rfl: 5    FLUoxetine (PROzac) 20 MG tablet, Take 1 tablet (20 mg total) by mouth daily (Patient not taking: Reported on 7/9/2024), Disp: 30 tablet, Rfl: 1    fluticasone (FLONASE) 50 mcg/act nasal spray, 1 spray into each nostril daily (Patient not taking: Reported on 9/1/2024), Disp: 9.9 mL, Rfl: 0    gabapentin (NEURONTIN) 100 mg capsule, Take 1 capsule (100 mg total) by mouth 3 (three) times a day for 14 days (Patient not taking: Reported on 4/18/2024), Disp: 42 capsule, Rfl: 0    Magnesium Gluconate 550 MG TABS, Take 400 mg by mouth 2 (two) times a day (Patient not taking: Reported on 9/1/2024), Disp: , Rfl:     methylPREDNISolone 4 MG tablet therapy pack, Use as directed on package (Patient not taking: Reported on 7/23/2024), Disp: 21 tablet, Rfl: 0    NON FORMULARY, Citlaly Nelly (Patient not taking: Reported on 7/23/2024), Disp: , Rfl:     Restasis 0.05 % ophthalmic emulsion, , Disp: , Rfl:     VITAMIN D PO, Take by mouth (Patient not taking: Reported on 8/27/2024), Disp: , Rfl:     Current Allergies     Allergies as of 09/01/2024 - Reviewed 09/01/2024   Allergen Reaction Noted     Amoxicillin-pot clavulanate Facial Swelling 03/28/2024            The following portions of the patient's history were reviewed and updated as appropriate: allergies, current medications, past family history, past medical history, past social history, past surgical history and problem list.     Past Medical History:   Diagnosis Date    Abdominal pain     with nausea    ADHD     Anemia     Anxiety     Chlamydia     Diarrhea     GERD (gastroesophageal reflux disease)     Migraine     Mononucleosis     Ovarian cyst     PONV (postoperative nausea and vomiting)        Past Surgical History:   Procedure Laterality Date    COLONOSCOPY      SINUS SURGERY      UPPER GASTROINTESTINAL ENDOSCOPY      WISDOM TOOTH EXTRACTION         Family History   Problem Relation Age of Onset    Hypertension Mother     Hypertension Father     No Known Problems Brother     Hypertension Maternal Grandmother     Cancer Maternal Grandfather         bladder    Lupus Paternal Grandmother     Diabetes Paternal Grandmother     Alzheimer's disease Paternal Grandfather          Medications have been verified.        Objective   /88   Pulse 78   Temp 98.7 °F (37.1 °C) (Temporal)   Resp 16   Wt 59.4 kg (131 lb)   SpO2 100%   BMI 21.80 kg/m²   No LMP recorded.       Physical Exam     Physical Exam  Vitals and nursing note reviewed.   Constitutional:       General: She is not in acute distress.     Appearance: Normal appearance. She is normal weight. She is not ill-appearing, toxic-appearing or diaphoretic.   HENT:      Head: Normocephalic and atraumatic.      Right Ear: Tympanic membrane and ear canal normal.      Left Ear: Tympanic membrane and ear canal normal.      Nose: Nose normal. No congestion or rhinorrhea.      Mouth/Throat:      Mouth: Mucous membranes are moist.      Pharynx: No oropharyngeal exudate or posterior oropharyngeal erythema.      Comments: PND   Eyes:      Extraocular Movements: Extraocular movements intact.    Cardiovascular:      Rate and Rhythm: Normal rate and regular rhythm.      Pulses: Normal pulses.      Heart sounds: Normal heart sounds. No murmur heard.     No gallop.   Pulmonary:      Effort: Pulmonary effort is normal. No respiratory distress.      Breath sounds: Normal breath sounds. No stridor. No wheezing, rhonchi or rales.   Chest:      Chest wall: No tenderness.   Musculoskeletal:         General: Tenderness present. No swelling, deformity or signs of injury. Normal range of motion.      Cervical back: Normal range of motion.      Comments: TTP at right to T spine T-9  no lump palpated but with touched, pt pulls away and appears to be in pain.  No lump visualized and no step off palpated    Skin:     General: Skin is warm and dry.      Capillary Refill: Capillary refill takes less than 2 seconds.   Neurological:      General: No focal deficit present.      Mental Status: She is alert and oriented to person, place, and time.   Psychiatric:         Mood and Affect: Mood normal.         Behavior: Behavior normal.         Thought Content: Thought content normal.         Judgment: Judgment normal.

## 2024-09-05 NOTE — ED PROVIDER NOTES
"History  Chief Complaint   Patient presents with    Back Pain     Started 2 weeks ago; noticed small lump tender to touch     Patient is a 29-year-old female without pertinent medical history presents for evaluation of lumbar back pain/flank pain.  She says over the past 2 weeks she has been having intermittently worsening lumbar back pain.  She says that the pain seems to radiate from her bilateral flank/lumbar back into her thighs bilaterally.  Pain is worsened by certain types of movement and positions.  She denies red flags including bowel/bladder incontinence, saddle anesthesia.  No urinary complaints, hematuria dysuria.  She denies diarrhea melena or hematochezia.  No anterior abdominal pain.  She feels like there is a \"bump\" on her lumbar spine that is the source of her pain.        Prior to Admission Medications   Prescriptions Last Dose Informant Patient Reported? Taking?   FLUoxetine (PROzac) 20 MG tablet  Self No No   Sig: Take 1 tablet (20 mg total) by mouth daily   Patient not taking: Reported on 2024   Iron-Vitamin C (Vitron-C)  MG TABS   No No   Sig: Take 1 tablet by mouth 2 (two) times a day   Magnesium Gluconate 550 MG TABS  Self Yes No   Sig: Take 400 mg by mouth 2 (two) times a day   Patient not taking: Reported on 2024   Multiple Vitamin (MULTIVITAMIN ADULT PO)  Self Yes No   Sig: Take by mouth   NON FORMULARY  Self Yes No   Sig: Citlaly Villegas   Patient not taking: Reported on 2024   Restasis 0.05 % ophthalmic emulsion  Self Yes No   Patient not taking: Reported on 2024   VITAMIN D PO  Self Yes No   Sig: Take by mouth   Patient not taking: Reported on 2024   dicyclomine (BENTYL) 10 mg capsule  Self No No   Sig: Take 1 capsule (10 mg total) by mouth 3 (three) times a day as needed (abdominal cramping)   Patient not taking: Reported on 2024   fluticasone (FLONASE) 50 mcg/act nasal spray  Self No No   Si spray into each nostril daily   Patient not taking: Reported on " 9/1/2024   gabapentin (NEURONTIN) 100 mg capsule   No No   Sig: Take 1 capsule (100 mg total) by mouth 3 (three) times a day for 14 days   Patient not taking: Reported on 4/18/2024   methylPREDNISolone 4 MG tablet therapy pack  Self No No   Sig: Use as directed on package   Patient not taking: Reported on 7/23/2024   predniSONE 10 mg tablet   No No   Sig: Take 5 tabs po x 2 days; 4 tabs po x 2 days; 3 tabs po x 1 day; 2 tabs po x 1 day. 1 tab po x 1 day.      Facility-Administered Medications: None       Past Medical History:   Diagnosis Date    Abdominal pain     with nausea    ADHD     Anemia     Anxiety     Chlamydia     Diarrhea     GERD (gastroesophageal reflux disease)     Migraine     Mononucleosis     Ovarian cyst     PONV (postoperative nausea and vomiting)        Past Surgical History:   Procedure Laterality Date    COLONOSCOPY      SINUS SURGERY      UPPER GASTROINTESTINAL ENDOSCOPY      WISDOM TOOTH EXTRACTION         Family History   Problem Relation Age of Onset    Hypertension Mother     Hypertension Father     No Known Problems Brother     Hypertension Maternal Grandmother     Cancer Maternal Grandfather         bladder    Lupus Paternal Grandmother     Diabetes Paternal Grandmother     Alzheimer's disease Paternal Grandfather      I have reviewed and agree with the history as documented.    E-Cigarette/Vaping    E-Cigarette Use Never User      E-Cigarette/Vaping Substances    Nicotine No     THC No     CBD No     Flavoring No     Other No     Unknown No      Social History     Tobacco Use    Smoking status: Some Days     Current packs/day: 0.25     Average packs/day: 0.3 packs/day for 2.7 years (0.7 ttl pk-yrs)     Types: Cigarettes     Start date: 2022     Passive exposure: Past    Smokeless tobacco: Never   Vaping Use    Vaping status: Never Used   Substance Use Topics    Alcohol use: Yes     Alcohol/week: 1.0 standard drink of alcohol     Types: 1 Glasses of wine per week     Comment: socially  weekly    Drug use: Never       Review of Systems   Constitutional:  Negative for fever.   HENT:  Negative for sore throat.    Respiratory:  Negative for shortness of breath.    Cardiovascular:  Negative for chest pain.   Gastrointestinal:  Negative for abdominal pain.   Genitourinary:  Negative for dysuria.   Musculoskeletal:  Positive for back pain.   Skin:  Negative for rash.   Neurological:  Negative for light-headedness.   Psychiatric/Behavioral:  Negative for agitation.    All other systems reviewed and are negative.      Physical Exam  Physical Exam  Vitals reviewed.   Constitutional:       General: She is not in acute distress.     Appearance: She is well-developed.   HENT:      Head: Normocephalic.   Eyes:      Pupils: Pupils are equal, round, and reactive to light.   Cardiovascular:      Rate and Rhythm: Normal rate and regular rhythm.      Heart sounds: Normal heart sounds.   Pulmonary:      Effort: Pulmonary effort is normal.      Breath sounds: Normal breath sounds.   Abdominal:      General: Bowel sounds are normal. There is no distension.      Palpations: Abdomen is soft.      Tenderness: There is abdominal tenderness. There is no guarding.      Comments: Mild lower abdominal tenderness without rebound tenderness or guarding   Musculoskeletal:         General: Tenderness present. No deformity. Normal range of motion.      Cervical back: Normal range of motion and neck supple.      Comments: Diffuse tenderness to the lumbar back   Skin:     General: Skin is warm and dry.      Capillary Refill: Capillary refill takes less than 2 seconds.   Neurological:      Mental Status: She is alert and oriented to person, place, and time.      Cranial Nerves: No cranial nerve deficit.      Sensory: No sensory deficit.   Psychiatric:         Behavior: Behavior normal.         Thought Content: Thought content normal.         Judgment: Judgment normal.         Vital Signs  ED Triage Vitals [09/01/24 2008]   Temperature  Pulse Respirations Blood Pressure SpO2   98.5 °F (36.9 °C) (!) 110 18 130/80 100 %      Temp Source Heart Rate Source Patient Position - Orthostatic VS BP Location FiO2 (%)   Temporal Monitor -- -- --      Pain Score       8           Vitals:    09/01/24 2008   BP: 130/80   Pulse: (!) 110         Visual Acuity      ED Medications  Medications   ketorolac (TORADOL) injection 15 mg (15 mg Intravenous Given 9/1/24 2020)   potassium chloride (Klor-Con M20) CR tablet 40 mEq (40 mEq Oral Given 9/1/24 2116)   iohexol (OMNIPAQUE) 350 MG/ML injection (MULTI-DOSE) 100 mL (100 mL Intravenous Given 9/1/24 2056)   sulfamethoxazole-trimethoprim (BACTRIM DS) 800-160 mg per tablet 1 tablet (1 tablet Oral Given 9/1/24 2151)       Diagnostic Studies  Results Reviewed       Procedure Component Value Units Date/Time    Urine Microscopic [[id number]]  (Abnormal) Collected: 09/01/24 2029    Lab Status: Final result Specimen: Urine, Clean Catch Updated: 09/01/24 2048     RBC, UA 0-1 /hpf      WBC, UA 1-2 /hpf      Epithelial Cells Occasional /hpf      Bacteria, UA Moderate /hpf     CMP [[id number]]  (Abnormal) Collected: 09/01/24 2020    Lab Status: Final result Specimen: Blood from Arm, Right Updated: 09/01/24 2044     Sodium 140 mmol/L      Potassium 3.1 mmol/L      Chloride 105 mmol/L      CO2 27 mmol/L      ANION GAP 8 mmol/L      BUN 11 mg/dL      Creatinine 0.87 mg/dL      Glucose 83 mg/dL      Calcium 9.4 mg/dL      AST 12 U/L      ALT 8 U/L      Alkaline Phosphatase 56 U/L      Total Protein 7.4 g/dL      Albumin 4.9 g/dL      Total Bilirubin 0.52 mg/dL      eGFR 90 ml/min/1.73sq m     Narrative:      National Kidney Disease Foundation guidelines for Chronic Kidney Disease (CKD):     Stage 1 with normal or high GFR (GFR > 90 mL/min/1.73 square meters)    Stage 2 Mild CKD (GFR = 60-89 mL/min/1.73 square meters)    Stage 3A Moderate CKD (GFR = 45-59 mL/min/1.73 square meters)    Stage 3B Moderate CKD (GFR = 30-44 mL/min/1.73 square  meters)    Stage 4 Severe CKD (GFR = 15-29 mL/min/1.73 square meters)    Stage 5 End Stage CKD (GFR <15 mL/min/1.73 square meters)  Note: GFR calculation is accurate only with a steady state creatinine    Lipase [[id number]]  (Normal) Collected: 09/01/24 2020    Lab Status: Final result Specimen: Blood from Arm, Right Updated: 09/01/24 2044     Lipase 26 u/L     UA w Reflex to Microscopic w Reflex to Culture [[id number]]  (Abnormal) Collected: 09/01/24 2029    Lab Status: Final result Specimen: Urine, Clean Catch Updated: 09/01/24 2037     Color, UA Yellow     Clarity, UA Hazy     Specific Gravity, UA 1.010     pH, UA 6.5     Leukocytes, UA Negative     Nitrite, UA Negative     Protein, UA Negative mg/dl      Glucose, UA Negative mg/dl      Ketones, UA Negative mg/dl      Urobilinogen, UA 0.2 E.U./dl      Bilirubin, UA Negative     Occult Blood, UA 2+    POCT pregnancy, urine [[id number]]  (Normal) Resulted: 09/01/24 2029    Lab Status: Final result Specimen: Urine Updated: 09/01/24 2033     EXT Preg Test, Ur Negative     Control Valid    CBC and differential [[id number]] Collected: 09/01/24 2020    Lab Status: Final result Specimen: Blood from Arm, Right Updated: 09/01/24 2025     WBC 7.63 Thousand/uL      RBC 4.37 Million/uL      Hemoglobin 12.7 g/dL      Hematocrit 38.9 %      MCV 89 fL      MCH 29.1 pg      MCHC 32.6 g/dL      RDW 13.7 %      MPV 9.5 fL      Platelets 277 Thousands/uL      nRBC 0 /100 WBCs      Segmented % 55 %      Immature Grans % 0 %      Lymphocytes % 35 %      Monocytes % 8 %      Eosinophils Relative 1 %      Basophils Relative 1 %      Absolute Neutrophils 4.21 Thousands/µL      Absolute Immature Grans 0.01 Thousand/uL      Absolute Lymphocytes 2.67 Thousands/µL      Absolute Monocytes 0.62 Thousand/µL      Eosinophils Absolute 0.08 Thousand/µL      Basophils Absolute 0.04 Thousands/µL                    CT abdomen pelvis with contrast   Final Result by Mark Hyde MD (09/01 2138)      1.   Thickening of the gastric antral wall, may represent gastritis   2.  Again noted is left ovarian lesion, hemorrhagic cyst versus endometrioma, see most recent ultrasound for further management         Workstation performed: [id number]         CT recon only lumbar spine   Final Result by Mark Hyde MD (09/01 2141)      L5-S1 posterior disc protrusion            Workstation performed: [id number]                    Procedures  Procedures         ED Course                                 SBIRT 22yo+      Flowsheet Row Most Recent Value   Initial Alcohol Screen: US AUDIT-C     1. How often do you have a drink containing alcohol? 0 Filed at: 09/01/2024 2011   2. How many drinks containing alcohol do you have on a typical day you are drinking?  0 Filed at: 09/01/2024 2011   3a. Male UNDER 65: How often do you have five or more drinks on one occasion? 0 Filed at: 09/01/2024 2011   3b. FEMALE Any Age, or MALE 65+: How often do you have 4 or more drinks on one occassion? 0 Filed at: 09/01/2024 2011   Audit-C Score 0 Filed at: 09/01/2024 2011   MONIQUE: How many times in the past year have you...    Used an illegal drug or used a prescription medication for non-medical reasons? Never Filed at: 09/01/2024 2011                      Medical Decision Making  Patient is a 21-year-old female presents for evaluation of lumbar back pain.  Found to have bacteriuria with the flank pain, will treat as UTI.  Otherwise blood work unremarkable.  Imaging shows L5/S1 disc herniation which like the source of her symptoms.  Will treat with antibiotics and NSAIDs.    Amount and/or Complexity of Data Reviewed  Labs: ordered.  Radiology: ordered.    Risk  Prescription drug management.                 Disposition  Final diagnoses:   UTI (urinary tract infection)   Herniation of intervertebral disc between L5 and S1     Time reflects when diagnosis was documented in both MDM as applicable and the Disposition within this note       Time User Action  Codes Description Comment    9/1/2024  9:45 PM Yonatan Paul Add [N39.0] UTI (urinary tract infection)     9/1/2024  9:45 PM Yonatan Paul Add [M51.27] Herniation of intervertebral disc between L5 and S1           ED Disposition       ED Disposition   Discharge    Condition   Stable    Date/Time   Sun Sep 1, 2024 2145    Comment   Maryse R Flurer discharge to home/self care.                   Follow-up Information       Follow up With Specialties Details Why Contact Info Additional Information    Formerly [facility] Emergency Medicine  If symptoms worsen [address] [id number] [phone] Formerly [facility], [address], [place], [place] [address]            Discharge Medication List as of 9/1/2024  9:46 PM        START taking these medications    Details   cyclobenzaprine (FLEXERIL) 10 mg tablet Take 1 tablet (10 mg total) by mouth 2 (two) times a day as needed for muscle spasms, Starting Sun 9/1/2024, Normal      ibuprofen (MOTRIN) 800 mg tablet Take 1 tablet (800 mg total) by mouth every 8 (eight) hours as needed for mild pain, Starting Sun 9/1/2024, Normal      sulfamethoxazole-trimethoprim (BACTRIM DS) 800-160 mg per tablet Take 1 tablet by mouth 2 (two) times a day for 7 days smx-tmp DS (BACTRIM) 800-160 mg tabs (1tab q12 D10), Starting Sun 9/1/2024, Until Sun 9/8/2024, Normal           CONTINUE these medications which have NOT CHANGED    Details   dicyclomine (BENTYL) 10 mg capsule Take 1 capsule (10 mg total) by mouth 3 (three) times a day as needed (abdominal cramping), Starting Thu 11/9/2023, Normal      FLUoxetine (PROzac) 20 MG tablet Take 1 tablet (20 mg total) by mouth daily, Starting Thu 4/18/2024, Normal      fluticasone (FLONASE) 50 mcg/act nasal spray 1 spray into each nostril daily, Starting Tue 3/19/2024, Normal      gabapentin (NEURONTIN) 100 mg capsule Take 1 capsule (100 mg total) by  mouth 3 (three) times a day for 14 days, Starting Wed 4/10/2024, Until Wed 4/24/2024, Normal      Iron-Vitamin C (Vitron-C)  MG TABS Take 1 tablet by mouth 2 (two) times a day, Starting u 8/1/2024, Normal      Magnesium Gluconate 550 MG TABS Take 400 mg by mouth 2 (two) times a day, Historical Med      methylPREDNISolone 4 MG tablet therapy pack Use as directed on package, Normal      Multiple Vitamin (MULTIVITAMIN ADULT PO) Take by mouth, Historical Med      NON FORMULARY Citlaly Villegas, Historical Med      predniSONE 10 mg tablet Take 5 tabs po x 2 days; 4 tabs po x 2 days; 3 tabs po x 1 day; 2 tabs po x 1 day. 1 tab po x 1 day., Normal      Restasis 0.05 % ophthalmic emulsion Historical Med      VITAMIN D PO Take by mouth, Historical Med             No discharge procedures on file.    PDMP Review       None            ED Provider  Electronically Signed by             Yonatan Paul MD  09/05/24 [id number]

## 2024-09-17 ENCOUNTER — OFFICE VISIT (OUTPATIENT)
Dept: OBGYN CLINIC | Facility: CLINIC | Age: 29
End: 2024-09-17
Payer: COMMERCIAL

## 2024-09-17 ENCOUNTER — APPOINTMENT (OUTPATIENT)
Dept: LAB | Facility: CLINIC | Age: 29
End: 2024-09-17

## 2024-09-17 ENCOUNTER — OCCMED (OUTPATIENT)
Dept: URGENT CARE | Facility: CLINIC | Age: 29
End: 2024-09-17

## 2024-09-17 VITALS
BODY MASS INDEX: 22.99 KG/M2 | SYSTOLIC BLOOD PRESSURE: 124 MMHG | DIASTOLIC BLOOD PRESSURE: 78 MMHG | HEIGHT: 65 IN | WEIGHT: 138 LBS

## 2024-09-17 DIAGNOSIS — Z02.89 ENCOUNTER FOR PHYSICAL EXAMINATION RELATED TO EMPLOYMENT: Primary | ICD-10-CM

## 2024-09-17 DIAGNOSIS — Z02.89 ENCOUNTER FOR PHYSICAL EXAMINATION RELATED TO EMPLOYMENT: ICD-10-CM

## 2024-09-17 DIAGNOSIS — R10.2 PELVIC PAIN: ICD-10-CM

## 2024-09-17 DIAGNOSIS — N80.129 ENDOMETRIOMA OF OVARY: Primary | ICD-10-CM

## 2024-09-17 LAB
MEV IGG SER QL IA: NORMAL
MUV IGG SER QL IA: NORMAL
RUBV IGG SERPL IA-ACNC: 46.7 IU/ML
VZV IGG SER QL IA: NORMAL

## 2024-09-17 PROCEDURE — 86762 RUBELLA ANTIBODY: CPT

## 2024-09-17 PROCEDURE — 86735 MUMPS ANTIBODY: CPT

## 2024-09-17 PROCEDURE — 86787 VARICELLA-ZOSTER ANTIBODY: CPT

## 2024-09-17 PROCEDURE — 86765 RUBEOLA ANTIBODY: CPT

## 2024-09-17 PROCEDURE — 36415 COLL VENOUS BLD VENIPUNCTURE: CPT

## 2024-09-17 PROCEDURE — 99214 OFFICE O/P EST MOD 30 MIN: CPT | Performed by: STUDENT IN AN ORGANIZED HEALTH CARE EDUCATION/TRAINING PROGRAM

## 2024-09-17 PROCEDURE — 86480 TB TEST CELL IMMUN MEASURE: CPT

## 2024-09-17 NOTE — PROGRESS NOTES
[facility] [address] Ashish Luther PA    Assessment/Plan:  Maryse Reid is a 29 y.o.  with known chronic pelvic pain who presents to follow up for recent ultrasound showing left ovarian endometrioma.    Endometrioma of ovary  - We reviewed a previous pelvic ultrasound from  which showed a similar finding on the left ovary, suggesting this lesion has been stable over that time.    - We reviewed her chronic pelvic pain, dyspareunia and dysmenorrhea which we have suspected was due to endometriosis.  - We discussed options including active surveillance verus laparoscopic ovarian cystectomy. We discussed the implications of endometriosis and ovarian cystectomy on fertility.  Due to her chronic pelvic pain, she wishes to undergo laparoscopic removal of ovarian cyst.  We discussed risks benefits and alternatives in detail.  - Indication for procedure: left ovarian endometrioma, chronic pelvic pain  - Planned procedure: diagnostic laparoscopy, left ovarian cystectomy, excision of endometriosis, exam under anesthesia  - Preoperative testing: Will obtain preoperative testing as per procedure pass. Patient is in good health and no additional cardiovascular workup indicated.  - Preoperative counseling: I discussed with patient indication, risks, benefits and alternatives of surgery.  We discussed possibility of bleeding requiring blood transfusion, life-threatening infections requiring additional procedures, injuries to surrounding organs such as bladder, ureters, gastrointestinal tract and or neurovascular structures.  Additionally, we discussed general risks associated to stress of surgery such as venous thromboembolism, acute myocardial events and stroke.  All questions answered to patient's satisfaction.  She agrees and wants to proceed.    - Written informed consent obtained in the office today and scanned into the patient's chart.   - We reviewed preoperative and  "postoperative instructions in detail.  We reviewed the patient's medication list.   - An updated history and physical was performed in the office today including heart and lung examination.  - Planned VTE Prophylaxis: sequential compression devices  - Planned preoperative antibiotics: none indicated  - Expected LOS: Discharge day of surgery    Diagnoses and all orders for this visit:    Endometrioma of ovary    Pelvic pain          Subjective:   Maryse Reid is a 29 y.o.  female.  CC: Abnormal ultrasound    HPI: Maryse follows up because she recently had an abnormal ultrasound.  Her hematologist noted that a previous pelvic US in  had a complex left ovarian cyst and ordered this study for follow up.  The ultrasound performed on 08/15/2024 is consistent with an endometrioma of the left ovary.    She continues to have chronic dysmenorrhea and dyspareunia despite Mirena IUD insertion in . She endorses left sided pelvic pain.        ROS: Review of Systems   Constitutional:  Negative for chills and fever.   Respiratory:  Negative for cough and shortness of breath.    Cardiovascular:  Negative for chest pain and leg swelling.   Gastrointestinal:  Negative for abdominal pain, nausea and vomiting.   Genitourinary:  Negative for dysuria, frequency and urgency.   Neurological:  Negative for dizziness, light-headedness and headaches.       PFSH: The following portions of the patient's history were reviewed and updated as appropriate: allergies, current medications, past family history, past medical history, obstetric history, gynecologic history, past social history, past surgical history and problem list.       Objective:  /78   Ht 5' 5\" (1.651 m)   Wt 62.6 kg (138 lb)   LMP 2024   BMI 22.96 kg/m²    Physical Exam  Constitutional:       Appearance: Normal appearance.   HENT:      Head: Normocephalic and atraumatic.      Mouth/Throat:      Mouth: Mucous membranes are moist.      Pharynx: " Oropharynx is clear. No oropharyngeal exudate.   Cardiovascular:      Rate and Rhythm: Normal rate and regular rhythm.      Pulses: Normal pulses.      Heart sounds: Normal heart sounds. No murmur heard.     No friction rub. No gallop.   Pulmonary:      Effort: Pulmonary effort is normal. No respiratory distress.      Breath sounds: Normal breath sounds. No wheezing, rhonchi or rales.   Abdominal:      General: Abdomen is flat. There is no distension.      Palpations: Abdomen is soft. There is no mass.      Tenderness: There is no abdominal tenderness.      Hernia: No hernia is present.   Musculoskeletal:         General: No deformity or signs of injury. Normal range of motion.      Right lower leg: No edema.      Left lower leg: No edema.   Skin:     General: Skin is warm and dry.   Neurological:      General: No focal deficit present.      Mental Status: She is alert and oriented to person, place, and time.   Psychiatric:         Mood and Affect: Mood normal.         Behavior: Behavior normal.           I have spent a total time of 30 minutes in caring for this patient on the day of the visit/encounter including Diagnostic results, Prognosis, Risks and benefits of tx options, Counseling / Coordination of care, Documenting in the medical record, and Communicating with other healthcare professionals .      Mirlande Mendez MD  [facility]  [facility]  9/17/2024 1:21 PM

## 2024-09-17 NOTE — ASSESSMENT & PLAN NOTE
- We reviewed a previous pelvic ultrasound from 2023 which showed a similar finding on the left ovary, suggesting this lesion has been stable over that time.    - We reviewed her chronic pelvic pain, dyspareunia and dysmenorrhea which we have suspected was due to endometriosis.  - We discussed options including active surveillance verus laparoscopic ovarian cystectomy. We discussed the implications of endometriosis and ovarian cystectomy on fertility.  Due to her chronic pelvic pain, she wishes to undergo laparoscopic removal of ovarian cyst.  We discussed risks benefits and alternatives in detail.  - Indication for procedure: left ovarian endometrioma, chronic pelvic pain  - Planned procedure: diagnostic laparoscopy, left ovarian cystectomy, excision of endometriosis, exam under anesthesia  - Preoperative testing: Will obtain preoperative testing as per procedure pass. Patient is in good health and no additional cardiovascular workup indicated.  - Preoperative counseling: I discussed with patient indication, risks, benefits and alternatives of surgery.  We discussed possibility of bleeding requiring blood transfusion, life-threatening infections requiring additional procedures, injuries to surrounding organs such as bladder, ureters, gastrointestinal tract and or neurovascular structures.  Additionally, we discussed general risks associated to stress of surgery such as venous thromboembolism, acute myocardial events and stroke.  All questions answered to patient's satisfaction.  She agrees and wants to proceed.    - Written informed consent obtained in the office today and scanned into the patient's chart.   - We reviewed preoperative and postoperative instructions in detail.  We reviewed the patient's medication list.   - An updated history and physical was performed in the office today including heart and lung examination.  - Planned VTE Prophylaxis: sequential compression devices  - Planned preoperative  antibiotics: none indicated  - Expected LOS: Discharge day of surgery

## 2024-09-18 LAB
GAMMA INTERFERON BACKGROUND BLD IA-ACNC: 0.01 IU/ML
M TB IFN-G BLD-IMP: NEGATIVE
M TB IFN-G CD4+ BCKGRND COR BLD-ACNC: -0.01 IU/ML
M TB IFN-G CD4+ BCKGRND COR BLD-ACNC: -0.01 IU/ML
MITOGEN IGNF BCKGRD COR BLD-ACNC: 9.99 IU/ML

## 2024-09-19 ENCOUNTER — TELEPHONE (OUTPATIENT)
Dept: OBGYN CLINIC | Facility: MEDICAL CENTER | Age: 29
End: 2024-09-19

## 2024-09-19 NOTE — TELEPHONE ENCOUNTER
Spoke with patient.. We tentatively are choosing October 24, 2024.. Patient will call back to confirm after she checks with her job and her loved ones..

## 2024-09-19 NOTE — TELEPHONE ENCOUNTER
----- Message from Mirlande Mendez MD sent at 2024  1:13 PM EDT -----  Regarding: left ovarian cystectomy  Surgical Scheduling Request    Name: Maryse Reid  : 1995  MRN: [id number]      Procedure: diagnostic laparoscopy, left ovarian cystectomy, excision of endometriosis, exam under anesthesia  Diagnosis: left ovarian endometrioma, pelvic pain    Location: [facility]    Anesthesia: general anesthesia  Admit: outpatient  Surgical PA or 2nd Surgeon Required?:  yes  Scheduling Urgency: next available       Medical Clearance: no      Does patient need...  MA31: no   Surgical Drink: yes  Surgical Scrub: yes    Preop Appt: within 30d of surgery  Postop Appt: 1-2wk postop  Anticipated Leave Time:  weeks

## 2024-09-20 ENCOUNTER — HOSPITAL ENCOUNTER (OUTPATIENT)
Facility: HOSPITAL | Age: 29
Setting detail: OUTPATIENT SURGERY
End: 2024-09-20
Attending: STUDENT IN AN ORGANIZED HEALTH CARE EDUCATION/TRAINING PROGRAM | Admitting: STUDENT IN AN ORGANIZED HEALTH CARE EDUCATION/TRAINING PROGRAM
Payer: COMMERCIAL

## 2024-09-20 ENCOUNTER — TELEPHONE (OUTPATIENT)
Dept: OBGYN CLINIC | Facility: MEDICAL CENTER | Age: 29
End: 2024-09-20

## 2024-09-20 NOTE — TELEPHONE ENCOUNTER
----- Message from Nikky WADE sent at 2024  1:11 PM EDT -----  Regarding: RE: left ovarian cystectomy    ----- Message -----  From: Mirlande Mendez MD  Sent: 2024   1:15 PM EDT  To: Nikky Nunes  Subject: left ovarian cystectomy                          Surgical Scheduling Request    Name: Maryse Reid  : 1995  MRN: [id number]      Procedure: diagnostic laparoscopy, left ovarian cystectomy, excision of endometriosis, exam under anesthesia  Diagnosis: left ovarian endometrioma, pelvic pain    Location: [facility]    Anesthesia: general anesthesia  Admit: outpatient  Surgical PA or 2nd Surgeon Required?:  yes  Scheduling Urgency: next available       Medical Clearance: no      Does patient need...  MA31: no   Surgical Drink: yes  Surgical Scrub: yes    Preop Appt: within 30d of surgery  Postop Appt: 1-2wk postop  Anticipated Leave Time:  weeks

## 2024-09-20 NOTE — TELEPHONE ENCOUNTER
----- Message from Nikky WADE sent at 2024  1:11 PM EDT -----  Regarding: RE: left ovarian cystectomy    ----- Message -----  From: Mirlande Mendez MD  Sent: 2024   1:15 PM EDT  To: Nikky Nunes  Subject: left ovarian cystectomy                          Surgical Scheduling Request    Name: Maryse Reid  : 1995  MRN: [id number]      Procedure: diagnostic laparoscopy, left ovarian cystectomy, excision of endometriosis, exam under anesthesia  Diagnosis: left ovarian endometrioma, pelvic pain    Location: [facility]    Anesthesia: general anesthesia  Admit: outpatient  Surgical PA or 2nd Surgeon Required?:  yes  Scheduling Urgency: next available       Medical Clearance: no      Does patient need...  MA31: no   Surgical Drink: yes  Surgical Scrub: yes    Preop Appt: within 30d of surgery  Postop Appt: 1-2wk postop  Anticipated Leave Time:  weeks

## 2024-09-20 NOTE — TELEPHONE ENCOUNTER
Surgery date confirmed for Oct 17 , 2024 at [facility].. Case entered and labs ordered.. My chart messaged patient that labs were ordered and when to do them and also that she does not need a pre-op appointment.. Post-op appointment scheduled.. Teams number given..

## 2024-10-01 ENCOUNTER — NURSE TRIAGE (OUTPATIENT)
Age: 29
End: 2024-10-01

## 2024-10-01 NOTE — TELEPHONE ENCOUNTER
"Patient calling in stating that she has vaginal pain, and burning sensation and burning with urination. Pt reporting that she had also heavy vaginal bleeding after intercourse. Pt reporting that she is not currently experiencing vaginal bleeding at this time. Pt reporting that her partner was with multiple other partners as well as pt is concerned for STDs.     Patient was notified that as per Brittany in the office there are no available appts today in any of the 4 locations. Patient was notifeid of this, and pt is scheduled for tomorrow at 930am in the [place] Location. No further questions.       Patient was provided bleeding precautions of:   Please go to the nearest emergency room or call 911 if you have heavy bleeding saturating more then one pad an hour, large clots the size of a golf ball or plum, if you have excruciating abdominal pain, Pt verbalized understanding, no further questions at this time        Reason for Disposition   Female and ANY of the following: * Burning (pain) with urination * Unexplained lower abdominal pain * Abnormal color of vaginal discharge (i.e., yellow, green, gray) * Bad-smelling vaginal discharge    Answer Assessment - Initial Assessment Questions  1. TYPE of EXPOSURE: \"How were you exposed to the STD?\" (e.g., oral, vaginal, or rectal intercourse)        Pt reporting oral and vaginal exposure     2. DATE of EXPOSURE: \"When did the exposure occur?\" (e.g., days)  Over the last couple of months as per pt report.           3. SYMPTOMS: \"Do you have any symptoms?\" (e.g., pain with urination)      Vaginal pain and burning sensation and vaginal bleeding at times, pt denies vaginal bleeding at this time.    Protocols used: STD Exposure and Prevention-ADULT-[place]    "

## 2024-10-02 ENCOUNTER — OFFICE VISIT (OUTPATIENT)
Dept: OBGYN CLINIC | Facility: MEDICAL CENTER | Age: 29
End: 2024-10-02
Payer: COMMERCIAL

## 2024-10-02 ENCOUNTER — APPOINTMENT (OUTPATIENT)
Dept: LAB | Facility: MEDICAL CENTER | Age: 29
End: 2024-10-02
Payer: COMMERCIAL

## 2024-10-02 VITALS
SYSTOLIC BLOOD PRESSURE: 120 MMHG | DIASTOLIC BLOOD PRESSURE: 82 MMHG | HEIGHT: 65 IN | BODY MASS INDEX: 22.33 KG/M2 | WEIGHT: 134 LBS

## 2024-10-02 DIAGNOSIS — N89.8 VAGINAL DISCHARGE: ICD-10-CM

## 2024-10-02 DIAGNOSIS — S37.69XA ABRASION OF CERVIX, INITIAL ENCOUNTER: ICD-10-CM

## 2024-10-02 DIAGNOSIS — Z20.2 STD EXPOSURE: Primary | ICD-10-CM

## 2024-10-02 DIAGNOSIS — Z01.818 PRE-OP TESTING: ICD-10-CM

## 2024-10-02 DIAGNOSIS — Z20.2 STD EXPOSURE: ICD-10-CM

## 2024-10-02 LAB
HBV SURFACE AG SER QL: NORMAL
HCV AB SER QL: NORMAL
HIV 1+2 AB+HIV1 P24 AG SERPL QL IA: NORMAL
HIV 2 AB SERPL QL IA: NORMAL
HIV1 AB SERPL QL IA: NORMAL
HIV1 P24 AG SERPL QL IA: NORMAL
TREPONEMA PALLIDUM IGG+IGM AB [PRESENCE] IN SERUM OR PLASMA BY IMMUNOASSAY: NORMAL

## 2024-10-02 PROCEDURE — 87510 GARDNER VAG DNA DIR PROBE: CPT | Performed by: OBSTETRICS & GYNECOLOGY

## 2024-10-02 PROCEDURE — 87591 N.GONORRHOEAE DNA AMP PROB: CPT | Performed by: OBSTETRICS & GYNECOLOGY

## 2024-10-02 PROCEDURE — 87480 CANDIDA DNA DIR PROBE: CPT | Performed by: OBSTETRICS & GYNECOLOGY

## 2024-10-02 PROCEDURE — 99214 OFFICE O/P EST MOD 30 MIN: CPT | Performed by: OBSTETRICS & GYNECOLOGY

## 2024-10-02 PROCEDURE — 87340 HEPATITIS B SURFACE AG IA: CPT

## 2024-10-02 PROCEDURE — 86803 HEPATITIS C AB TEST: CPT

## 2024-10-02 PROCEDURE — 87660 TRICHOMONAS VAGIN DIR PROBE: CPT | Performed by: OBSTETRICS & GYNECOLOGY

## 2024-10-02 PROCEDURE — 87389 HIV-1 AG W/HIV-1&-2 AB AG IA: CPT

## 2024-10-02 PROCEDURE — 86780 TREPONEMA PALLIDUM: CPT

## 2024-10-02 PROCEDURE — 87491 CHLMYD TRACH DNA AMP PROBE: CPT | Performed by: OBSTETRICS & GYNECOLOGY

## 2024-10-02 PROCEDURE — 36415 COLL VENOUS BLD VENIPUNCTURE: CPT

## 2024-10-02 NOTE — PROGRESS NOTES
"Assessment Maryse was seen today for exposure to std.    Diagnoses and all orders for this visit:    STD exposure  -     HIV 1/2 AG/AB w Reflex [facility] for 2 yr old and above; Future  -     RPR-Syphilis Screening (Total Syphilis IGG/IGM); Future  -     Hepatitis B surface antigen; Future  -     Hepatitis C antibody; Future  -     Chlamydia/GC amplified DNA by PCR    Vaginal discharge  -     Vaginosis DNA Probe; Future  -     Vaginosis DNA Probe    Abrasion of cervix, initial encounter         Plan  VD: Affirm taken.  STD testing: GC chlamydia tested today.  Patient will present to lab for HIV, RPR, hepatitis B and C.  Cervical abrasion: Appears to be healing.  No evidence of infection.    Subjective   Maryse Reid is a 29 y.o. female here for a problem visit.  Pt reports she discovered that her partner from over the summer had multiple other partners.  Pt is with a new partner x 2 weeks.  No condoms.  One month ago, pt had back pain and pt was tested for kidney stones.  Was diagnosed with a UTI and given antibiotics.  Pt reports some blood after with masturbation 3 days ago.  Pt reports some pain when this happened.  Thinks her uterus feels inflamed, sore and \"on fire\".  Pt has had not noticed some VD but always has spotting.  Pt reports some \"chunky discharge\" two weeks ago.  Pt reports no other symptoms.  Patient is scheduled for a diagnostic laparoscopy for October 17 for an endometrioma.  Patient Active Problem List   Diagnosis    Iron deficiency anemia    Fatigue    Dysmenorrhea    Xerostomia    PCOS (polycystic ovarian syndrome)    Nausea and vomiting    Migraine    GERD (gastroesophageal reflux disease)    Anxiety    ADHD    Dysarthria    Thyroid antibody positive    Postprandial epigastric pain    Endometrioma of ovary       Gynecologic History  Patient's last menstrual period was 09/17/2024.  The current method of family planning is IUD.    Past Medical History:   Diagnosis Date    Abdominal pain     " with nausea    ADHD     Anemia     Anxiety     Chlamydia     Diarrhea     GERD (gastroesophageal reflux disease)     Migraine     Mononucleosis     Ovarian cyst     PONV (postoperative nausea and vomiting)      Past Surgical History:   Procedure Laterality Date    COLONOSCOPY      SINUS SURGERY      UPPER GASTROINTESTINAL ENDOSCOPY      WISDOM TOOTH EXTRACTION       Family History   Problem Relation Age of Onset    Hypertension Mother     Hypertension Father     No Known Problems Brother     Hypertension Maternal Grandmother     Cancer Maternal Grandfather         bladder    Lupus Paternal Grandmother     Diabetes Paternal Grandmother     Alzheimer's disease Paternal Grandfather      Social History     Socioeconomic History    Marital status: Single     Spouse name: Not on file    Number of children: Not on file    Years of education: Not on file    Highest education level: Not on file   Occupational History    Not on file   Tobacco Use    Smoking status: Some Days     Current packs/day: 0.25     Average packs/day: 0.3 packs/day for 2.8 years (0.7 ttl pk-yrs)     Types: Cigarettes     Start date: 2022     Passive exposure: Past    Smokeless tobacco: Never   Vaping Use    Vaping status: Never Used   Substance and Sexual Activity    Alcohol use: Yes     Alcohol/week: 1.0 standard drink of alcohol     Types: 1 Glasses of wine per week     Comment: socially weekly    Drug use: Never    Sexual activity: Yes     Partners: Male     Birth control/protection: I.U.D.     Comment: history STD-chlamydia   Other Topics Concern    Not on file   Social History Narrative    Lives in house with BF            Currently in college        Not working while in school     Social Determinants of Health     Financial Resource Strain: Not on file   Food Insecurity: No Food Insecurity (4/8/2024)    Hunger Vital Sign     Worried About Running Out of Food in the Last Year: Never true     Ran Out of Food in the Last Year: Never true    Transportation Needs: No Transportation Needs (4/8/2024)    PRAPARE - Transportation     Lack of Transportation (Medical): No     Lack of Transportation (Non-Medical): No   Physical Activity: Not on file   Stress: Not on file   Social Connections: Unknown (6/18/2024)    Received from [facility]     How often do you feel lonely or isolated from those around you? (Adult - for ages 18 years and over): Not on file   Intimate Partner Violence: Not on file   Housing Stability: Low Risk  (4/8/2024)    Housing Stability Vital Sign     Unable to Pay for Housing in the Last Year: No     Number of Times Moved in the Last Year: 1     Homeless in the Last Year: No     Allergies   Allergen Reactions    Amoxicillin-Pot Clavulanate Facial Swelling     Other Reaction(s): Facial Swelling       Current Outpatient Medications:     cyclobenzaprine (FLEXERIL) 10 mg tablet, Take 1 tablet (10 mg total) by mouth 2 (two) times a day as needed for muscle spasms, Disp: 20 tablet, Rfl: 0    fluticasone (FLONASE) 50 mcg/act nasal spray, 1 spray into each nostril daily, Disp: 9.9 mL, Rfl: 0    ibuprofen (MOTRIN) 800 mg tablet, Take 1 tablet (800 mg total) by mouth every 8 (eight) hours as needed for mild pain, Disp: 21 tablet, Rfl: 0    Iron-Vitamin C (Vitron-C)  MG TABS, Take 1 tablet by mouth 2 (two) times a day, Disp: 60 tablet, Rfl: 2    Magnesium Gluconate 550 MG TABS, Take 400 mg by mouth 2 (two) times a day, Disp: , Rfl:     VITAMIN D PO, Take by mouth, Disp: , Rfl:     Review of Systems  Constitutional :no fever, feels well, no tiredness, no recent weight gain or loss  ENT: no ear ache, no loss of hearing, no nosebleeds or nasal discharge, no sore throat or hoarseness.  Cardiovascular: no complaints of slow or fast heart beat, no chest pain, no palpitations, no leg claudication or lower extremity edema.  Respiratory: no complaints of shortness of shortness of breath, no ALLEN  Breasts:no complaints of breast  "pain, breast lump, or nipple discharge  Gastrointestinal: no complaints of abdominal pain, constipation, nausea, vomiting, or diarrhea or bloody stools  Genitourinary : no complaints of dysuria, incontinence, pelvic pain, no dysmenorrhea, +vaginal discharge and abnormal vaginal bleeding and as noted in HPI.  Musculoskeletal: no complaints of arthralgia, no myalgia, no joint swelling or stiffness, no limb pain or swelling.  Integumentary: no complaints of skin rash or lesion, itching or dry skin  Neurological: no complaints of headache, no confusion, no numbness or tingling, no dizziness or fainting     Objective     /82   Ht 5' 5\" (1.651 m)   Wt 60.8 kg (134 lb)   LMP 09/17/2024   BMI 22.30 kg/m²     General Appears stated age, cooperative, alert normal mood and affect   Psychiatric oriented to person, place and time.  Mood and affect normal   Vulva: normal , no lesions   Vagina: normal , no lesions or dryness   Urethra: normal   Urethal meatus normal   Bladder Normal, soft, non-tender and no prolapse or masses appreciated   Cervix: IUD string visualized, a 1-1/2 mm \"cut\" noticed at the 9 o'clock position.  No surrounding erythema or discharge      "

## 2024-10-03 ENCOUNTER — HOSPITAL ENCOUNTER (OUTPATIENT)
Dept: NUCLEAR MEDICINE | Facility: HOSPITAL | Age: 29
End: 2024-10-03
Payer: COMMERCIAL

## 2024-10-03 DIAGNOSIS — R10.13 POSTPRANDIAL EPIGASTRIC PAIN: ICD-10-CM

## 2024-10-03 DIAGNOSIS — R11.2 NAUSEA AND VOMITING, UNSPECIFIED VOMITING TYPE: ICD-10-CM

## 2024-10-03 LAB
C TRACH DNA SPEC QL NAA+PROBE: NEGATIVE
CANDIDA RRNA VAG QL PROBE: NOT DETECTED
G VAGINALIS RRNA GENITAL QL PROBE: NOT DETECTED
N GONORRHOEA DNA SPEC QL NAA+PROBE: NEGATIVE
T VAGINALIS RRNA GENITAL QL PROBE: NOT DETECTED

## 2024-10-03 PROCEDURE — 78264 GASTRIC EMPTYING IMG STUDY: CPT

## 2024-10-03 PROCEDURE — A9541 TC99M SULFUR COLLOID: HCPCS

## 2024-10-07 ENCOUNTER — APPOINTMENT (OUTPATIENT)
Dept: LAB | Facility: MEDICAL CENTER | Age: 29
End: 2024-10-07
Payer: COMMERCIAL

## 2024-10-07 DIAGNOSIS — Z01.818 PRE-OP TESTING: ICD-10-CM

## 2024-10-07 LAB
ANION GAP SERPL CALCULATED.3IONS-SCNC: 8 MMOL/L (ref 4–13)
BASOPHILS # BLD AUTO: 0.04 THOUSANDS/ΜL (ref 0–0.1)
BASOPHILS NFR BLD AUTO: 1 % (ref 0–1)
BUN SERPL-MCNC: 12 MG/DL (ref 5–25)
CALCIUM SERPL-MCNC: 8.7 MG/DL (ref 8.4–10.2)
CHLORIDE SERPL-SCNC: 105 MMOL/L (ref 96–108)
CO2 SERPL-SCNC: 27 MMOL/L (ref 21–32)
CREAT SERPL-MCNC: 0.83 MG/DL (ref 0.6–1.3)
EOSINOPHIL # BLD AUTO: 0.07 THOUSAND/ΜL (ref 0–0.61)
EOSINOPHIL NFR BLD AUTO: 1 % (ref 0–6)
ERYTHROCYTE [DISTWIDTH] IN BLOOD BY AUTOMATED COUNT: 12.9 % (ref 11.6–15.1)
GFR SERPL CREATININE-BSD FRML MDRD: 96 ML/MIN/1.73SQ M
GLUCOSE SERPL-MCNC: 87 MG/DL (ref 65–140)
HCT VFR BLD AUTO: 38.2 % (ref 34.8–46.1)
HGB BLD-MCNC: 12.6 G/DL (ref 11.5–15.4)
IMM GRANULOCYTES # BLD AUTO: 0.02 THOUSAND/UL (ref 0–0.2)
IMM GRANULOCYTES NFR BLD AUTO: 0 % (ref 0–2)
LYMPHOCYTES # BLD AUTO: 2.1 THOUSANDS/ΜL (ref 0.6–4.47)
LYMPHOCYTES NFR BLD AUTO: 28 % (ref 14–44)
MCH RBC QN AUTO: 30.4 PG (ref 26.8–34.3)
MCHC RBC AUTO-ENTMCNC: 33 G/DL (ref 31.4–37.4)
MCV RBC AUTO: 92 FL (ref 82–98)
MONOCYTES # BLD AUTO: 0.36 THOUSAND/ΜL (ref 0.17–1.22)
MONOCYTES NFR BLD AUTO: 5 % (ref 4–12)
NEUTROPHILS # BLD AUTO: 4.86 THOUSANDS/ΜL (ref 1.85–7.62)
NEUTS SEG NFR BLD AUTO: 65 % (ref 43–75)
NRBC BLD AUTO-RTO: 0 /100 WBCS
PLATELET # BLD AUTO: 271 THOUSANDS/UL (ref 149–390)
PMV BLD AUTO: 9.9 FL (ref 8.9–12.7)
POTASSIUM SERPL-SCNC: 3.7 MMOL/L (ref 3.5–5.3)
RBC # BLD AUTO: 4.15 MILLION/UL (ref 3.81–5.12)
SODIUM SERPL-SCNC: 140 MMOL/L (ref 135–147)
WBC # BLD AUTO: 7.45 THOUSAND/UL (ref 4.31–10.16)

## 2024-10-07 PROCEDURE — 86900 BLOOD TYPING SEROLOGIC ABO: CPT | Performed by: STUDENT IN AN ORGANIZED HEALTH CARE EDUCATION/TRAINING PROGRAM

## 2024-10-07 PROCEDURE — 80048 BASIC METABOLIC PNL TOTAL CA: CPT

## 2024-10-07 PROCEDURE — 85025 COMPLETE CBC W/AUTO DIFF WBC: CPT

## 2024-10-07 PROCEDURE — 86901 BLOOD TYPING SEROLOGIC RH(D): CPT | Performed by: STUDENT IN AN ORGANIZED HEALTH CARE EDUCATION/TRAINING PROGRAM

## 2024-10-07 PROCEDURE — 86850 RBC ANTIBODY SCREEN: CPT | Performed by: STUDENT IN AN ORGANIZED HEALTH CARE EDUCATION/TRAINING PROGRAM

## 2024-10-07 PROCEDURE — 36415 COLL VENOUS BLD VENIPUNCTURE: CPT

## 2024-10-08 ENCOUNTER — LAB REQUISITION (OUTPATIENT)
Dept: LAB | Facility: HOSPITAL | Age: 29
End: 2024-10-08
Payer: COMMERCIAL

## 2024-10-08 DIAGNOSIS — Z01.818 ENCOUNTER FOR OTHER PREPROCEDURAL EXAMINATION: ICD-10-CM

## 2024-10-08 LAB
ABO GROUP BLD: NORMAL
BLD GP AB SCN SERPL QL: NEGATIVE
RH BLD: POSITIVE
SPECIMEN EXPIRATION DATE: NORMAL

## 2024-10-15 ENCOUNTER — TELEPHONE (OUTPATIENT)
Dept: OBGYN CLINIC | Facility: MEDICAL CENTER | Age: 29
End: 2024-10-15

## 2024-10-29 ENCOUNTER — TELEPHONE (OUTPATIENT)
Dept: HEMATOLOGY ONCOLOGY | Facility: CLINIC | Age: 29
End: 2024-10-29

## 2024-10-29 NOTE — TELEPHONE ENCOUNTER
Called and spoke to pt whose requesting this appt be r/s because she can't get off from work. Understood she needed labs to be done and that she would go and do them.

## 2024-11-06 ENCOUNTER — TELEPHONE (OUTPATIENT)
Dept: GASTROENTEROLOGY | Facility: CLINIC | Age: 29
End: 2024-11-06

## 2024-11-06 NOTE — TELEPHONE ENCOUNTER
I lmom for pt to please call back to reschedule the appt scheduled on 1/15/25 with Dr Juarez due to a schedule change. I apologized for the inconvenience. Will call again if do not hear back from pt.

## 2024-11-13 NOTE — TELEPHONE ENCOUNTER
Pt returned call but says she dose not need to R/S at this time as she is doing fine, she will call if she needs a future appt

## 2024-11-13 NOTE — TELEPHONE ENCOUNTER
I lmom for pt to please call back to reschedule the appt scheduled on 1/15/25 with Dr Juarez due to a schedule change. I apologized for the inconvenience. Will call again if do not hear back from pt

## 2024-12-12 ENCOUNTER — TELEPHONE (OUTPATIENT)
Dept: FAMILY MEDICINE CLINIC | Facility: MEDICAL CENTER | Age: 29
End: 2024-12-12

## 2024-12-17 ENCOUNTER — OFFICE VISIT (OUTPATIENT)
Dept: FAMILY MEDICINE CLINIC | Facility: MEDICAL CENTER | Age: 29
End: 2024-12-17
Payer: COMMERCIAL

## 2024-12-17 ENCOUNTER — APPOINTMENT (OUTPATIENT)
Dept: LAB | Facility: HOSPITAL | Age: 29
End: 2024-12-17
Payer: COMMERCIAL

## 2024-12-17 VITALS
TEMPERATURE: 99.3 F | RESPIRATION RATE: 18 BRPM | HEIGHT: 65 IN | BODY MASS INDEX: 22.06 KG/M2 | HEART RATE: 68 BPM | SYSTOLIC BLOOD PRESSURE: 120 MMHG | DIASTOLIC BLOOD PRESSURE: 72 MMHG | WEIGHT: 132.4 LBS | OXYGEN SATURATION: 99 %

## 2024-12-17 DIAGNOSIS — M25.50 DIFFUSE ARTHRALGIA: Primary | ICD-10-CM

## 2024-12-17 DIAGNOSIS — R53.83 FATIGUE, UNSPECIFIED TYPE: ICD-10-CM

## 2024-12-17 DIAGNOSIS — R30.0 DYSURIA: ICD-10-CM

## 2024-12-17 DIAGNOSIS — Z91.018 FOOD ALLERGY: ICD-10-CM

## 2024-12-17 LAB
SL AMB  POCT GLUCOSE, UA: NORMAL
SL AMB LEUKOCYTE ESTERASE,UA: NORMAL
SL AMB POCT BILIRUBIN,UA: NORMAL
SL AMB POCT BLOOD,UA: NORMAL
SL AMB POCT CLARITY,UA: NORMAL
SL AMB POCT COLOR,UA: YELLOW
SL AMB POCT KETONES,UA: NORMAL
SL AMB POCT NITRITE,UA: NORMAL
SL AMB POCT PH,UA: 6
SL AMB POCT SPECIFIC GRAVITY,UA: 1.02
SL AMB POCT URINE PROTEIN: NORMAL
SL AMB POCT UROBILINOGEN: NORMAL

## 2024-12-17 PROCEDURE — 87086 URINE CULTURE/COLONY COUNT: CPT | Performed by: STUDENT IN AN ORGANIZED HEALTH CARE EDUCATION/TRAINING PROGRAM

## 2024-12-17 PROCEDURE — 99214 OFFICE O/P EST MOD 30 MIN: CPT | Performed by: STUDENT IN AN ORGANIZED HEALTH CARE EDUCATION/TRAINING PROGRAM

## 2024-12-17 PROCEDURE — 81002 URINALYSIS NONAUTO W/O SCOPE: CPT | Performed by: STUDENT IN AN ORGANIZED HEALTH CARE EDUCATION/TRAINING PROGRAM

## 2024-12-17 RX ORDER — PREDNISONE 20 MG/1
40 TABLET ORAL DAILY
Qty: 10 TABLET | Refills: 0 | Status: SHIPPED | OUTPATIENT
Start: 2024-12-17 | End: 2024-12-22

## 2024-12-17 RX ORDER — SULFAMETHOXAZOLE AND TRIMETHOPRIM 800; 160 MG/1; MG/1
1 TABLET ORAL 2 TIMES DAILY
Qty: 14 TABLET | Refills: 0 | Status: SHIPPED | OUTPATIENT
Start: 2024-12-17 | End: 2024-12-24

## 2024-12-17 NOTE — PROGRESS NOTES
[facility] - Clinic Note  Lilli Kiran, DO, 24     Maryse Reid MRN: [id number] : 1995 Age: 29 y.o.     Assessment/Plan     1. Diffuse arthralgia (Primary)    -Follow-up lab work per orders  -Encourage adequate fluid intake  -Start prednisone per orders  -Advised to schedule follow-up with rheumatology and endocrinology  - CBC and Platelet; Future  - Comprehensive Metabolic Panel; Future  - RF Screen w/ Reflex to Titer; Future  - Cyclic citrul peptide antibody, IgG; Future  - Sjogren's Antibodies; Future  - Lyme Total AB W Reflex to IGM/IGG; Future  - C-reactive protein; Future  - TEO Screen w/Reflex Cascade; Future  - Tick-borne Disease, Acute Molecular Panel, Non-Lyme; Future  - EBV Acute Panel; Future  - predniSONE 20 mg tablet; Take 2 tablets (40 mg total) by mouth daily for 5 days  Dispense: 10 tablet; Refill: 0    2. Fatigue, unspecified type    - CBC and Platelet; Future  - Comprehensive Metabolic Panel; Future  - RF Screen w/ Reflex to Titer; Future  - Cyclic citrul peptide antibody, IgG; Future  - Sjogren's Antibodies; Future  - Lyme Total AB W Reflex to IGM/IGG; Future  - C-reactive protein; Future  - TEO Screen w/Reflex Cascade; Future  - Tick-borne Disease, Acute Molecular Panel, Non-Lyme; Future  - EBV Acute Panel; Future  - TSH, 3rd generation with Free T4 reflex; Future      3. Dysuria    - POCT urine dip:   2024   Color, UA yellow    Clarity, UA cloudy    SL AMB SPECIFIC GRAVITY_URINE 1.025    pH, UA 6.0    Leukocytes, UA neg    Nitrite, UA neg    POCT URINE PROTEIN neg    Glucose, UA neg    Ketones, UA neg    Blood, UA 3+    SL AMB POCT UROBILINOGEN neg    BILIRUBIN,UA neg    - Urine culture  - sulfamethoxazole-trimethoprim (BACTRIM DS) 800-160 mg per tablet; Take 1 tablet by mouth 2 (two) times a day for 7 days  Dispense: 14 tablet; Refill: 0; advised about concurrent probiotic use  -Hydrate well  -Return to office 1 week after completion of antibiotic  course for test of cure    4. Food allergy    - Food Allergy Profile; Future      Maryse Reid acknowledged understanding of treatment plan, all questions answered.  Seek medical attention if any new or worsening symptoms.    Subjective     Maryse Reid is a 29 y.o. female who presents for evaluation of a rash involving the chest and face.  She is a patient of  ROSA M Villarreal. Rash started 4 days ago. Also mentions diffuse arthralgias.  Lesions are red, and raised in texture. Rash has changed over time. Rash is pruritic. Associated symptoms: arthralgia, subjective fever. Patient denies: cough, nausea, and vomiting. Patient has not had contacts with similar rash. Patient has not had new exposures (soaps, lotions, laundry detergents, foods, medications, plants, insects or animals).  Patient states she had a similar flareup of symptoms in July 2024 where she felt fatigued, arthralgias, rash. Patient also mentions dysuria and left flank pain. No hematuria.  Paternal grandmother with history of lupus.  Patient reports antibiotic allergy to Augmentin.  Patient also mentions it seems that she has throat irritation when eating shellfish.  Denies any anaphylaxis.      The following portions of the patient's history were reviewed and updated as appropriate: allergies, current medications, past family history, past medical history, past social history, past surgical history and problem list.     Past Medical History:   Diagnosis Date    Abdominal pain     with nausea    ADHD     Anemia     Anxiety     Chlamydia     Diarrhea     GERD (gastroesophageal reflux disease)     Migraine     Mononucleosis     Ovarian cyst     PONV (postoperative nausea and vomiting)        Allergies   Allergen Reactions    Amoxicillin-Pot Clavulanate Facial Swelling     Other Reaction(s): Facial Swelling       Past Surgical History:   Procedure Laterality Date    COLONOSCOPY      SINUS SURGERY      UPPER GASTROINTESTINAL ENDOSCOPY       WISDOM TOOTH EXTRACTION         Family History   Problem Relation Age of Onset    Hypertension Mother     Hypertension Father     No Known Problems Brother     Hypertension Maternal Grandmother     Cancer Maternal Grandfather         bladder    Lupus Paternal Grandmother     Diabetes Paternal Grandmother     Alzheimer's disease Paternal Grandfather        Social History     Socioeconomic History    Marital status: Single     Spouse name: None    Number of children: None    Years of education: None    Highest education level: None   Occupational History    None   Tobacco Use    Smoking status: Some Days     Current packs/day: 0.25     Average packs/day: 0.3 packs/day for 3.0 years (0.7 ttl pk-yrs)     Types: Cigarettes     Start date: 2022     Passive exposure: Past    Smokeless tobacco: Never   Vaping Use    Vaping status: Never Used   Substance and Sexual Activity    Alcohol use: Yes     Alcohol/week: 1.0 standard drink of alcohol     Types: 1 Glasses of wine per week     Comment: socially weekly    Drug use: Never    Sexual activity: Yes     Partners: Male     Birth control/protection: I.U.D.     Comment: history STD-chlamydia   Other Topics Concern    None   Social History Narrative    Lives in house with BF            Currently in college        Not working while in school     Social Drivers of Health     Financial Resource Strain: Not on file   Food Insecurity: No Food Insecurity (4/8/2024)    Nursing - Inadequate Food Risk Classification     Worried About Running Out of Food in the Last Year: Never true     Ran Out of Food in the Last Year: Never true     Ran Out of Food in the Last Year: Not on file   Transportation Needs: No Transportation Needs (4/8/2024)    PRAPARE - Transportation     Lack of Transportation (Medical): No     Lack of Transportation (Non-Medical): No   Physical Activity: Not on file   Stress: Not on file   Social Connections: Unknown (6/18/2024)    Received from [employer]  [facility]     How often do you feel lonely or isolated from those around you? (Adult - for ages 18 years and over): Not on file   Intimate Partner Violence: Not on file   Housing Stability: Low Risk  (4/8/2024)    Housing Stability Vital Sign     Unable to Pay for Housing in the Last Year: No     Number of Times Moved in the Last Year: 1     Homeless in the Last Year: No       Current Outpatient Medications   Medication Sig Dispense Refill    cyclobenzaprine (FLEXERIL) 10 mg tablet Take 1 tablet (10 mg total) by mouth 2 (two) times a day as needed for muscle spasms 20 tablet 0    fluticasone (FLONASE) 50 mcg/act nasal spray 1 spray into each nostril daily 9.9 mL 0    ibuprofen (MOTRIN) 800 mg tablet Take 1 tablet (800 mg total) by mouth every 8 (eight) hours as needed for mild pain 21 tablet 0    Iron-Vitamin C (Vitron-C)  MG TABS Take 1 tablet by mouth 2 (two) times a day 60 tablet 2    Magnesium Gluconate 550 MG TABS Take 400 mg by mouth 2 (two) times a day      VITAMIN D PO Take by mouth       No current facility-administered medications for this visit.       Review of Systems     As noted in HPI    Objective      /72 (BP Location: Left arm, Patient Position: Sitting, Cuff Size: Standard)   Pulse 68   Temp 99.3 °F (37.4 °C) (Temporal)   Resp 18   Ht 5' 5\" (1.651 m)   Wt 60.1 kg (132 lb 6.4 oz)   SpO2 99%   BMI 22.03 kg/m²     Physical Exam  Vitals reviewed.   Constitutional:       General: She is not in acute distress.  HENT:      Head: Normocephalic and atraumatic.      Right Ear: External ear normal.      Left Ear: External ear normal.      Nose: Nose normal.      Mouth/Throat:      Mouth: Mucous membranes are moist.      Pharynx: Oropharynx is clear.   Eyes:      Extraocular Movements: Extraocular movements intact.      Conjunctiva/sclera: Conjunctivae normal.      Pupils: Pupils are equal, round, and reactive to light.   Cardiovascular:      Rate and Rhythm: Normal rate and regular " "rhythm.      Pulses: Normal pulses.      Heart sounds: Normal heart sounds.   Pulmonary:      Effort: Pulmonary effort is normal.      Breath sounds: Normal breath sounds.   Abdominal:      General: Abdomen is flat. Bowel sounds are normal.      Palpations: Abdomen is soft.      Tenderness: There is no abdominal tenderness. There is left CVA tenderness. There is no right CVA tenderness, guarding or rebound.   Musculoskeletal:      Cervical back: Neck supple. No rigidity.      Right lower leg: No edema.      Left lower leg: No edema.   Lymphadenopathy:      Cervical: No cervical adenopathy.   Skin:     General: Skin is warm and dry.      Findings: Rash present.      Comments: Erythematous diffuse papular rash of face, not butterfly distribution   Neurological:      Mental Status: She is alert and oriented to person, place, and time.   Psychiatric:         Thought Content: Thought content normal.             Some portions of this record may have been generated with voice recognition software. There may be translation, syntax, or grammatical errors. Occasional wrong word or \"sound-a-like\" substitutions may have occurred due to the inherent limitations of the voice recognition software. Read the chart carefully and recognize, using context, where substations may have occurred. If you have any questions, please contact the dictating provider for clarification or correction, as needed.  "

## 2024-12-18 ENCOUNTER — APPOINTMENT (OUTPATIENT)
Dept: LAB | Facility: HOSPITAL | Age: 29
End: 2024-12-18
Payer: COMMERCIAL

## 2024-12-18 DIAGNOSIS — R53.83 FATIGUE, UNSPECIFIED TYPE: ICD-10-CM

## 2024-12-18 DIAGNOSIS — M25.50 DIFFUSE ARTHRALGIA: ICD-10-CM

## 2024-12-18 DIAGNOSIS — Z91.018 FOOD ALLERGY: ICD-10-CM

## 2024-12-18 LAB
ABO GROUP BLD: NORMAL
ALBUMIN SERPL BCG-MCNC: 4.4 G/DL (ref 3.5–5)
ALP SERPL-CCNC: 50 U/L (ref 34–104)
ALT SERPL W P-5'-P-CCNC: 6 U/L (ref 7–52)
ANION GAP SERPL CALCULATED.3IONS-SCNC: 4 MMOL/L (ref 4–13)
AST SERPL W P-5'-P-CCNC: 8 U/L (ref 13–39)
B BURGDOR IGG+IGM SER QL IA: NEGATIVE
BILIRUB SERPL-MCNC: 0.83 MG/DL (ref 0.2–1)
BLD GP AB SCN SERPL QL: NEGATIVE
BUN SERPL-MCNC: 10 MG/DL (ref 5–25)
CALCIUM SERPL-MCNC: 9.2 MG/DL (ref 8.4–10.2)
CHLORIDE SERPL-SCNC: 107 MMOL/L (ref 96–108)
CO2 SERPL-SCNC: 28 MMOL/L (ref 21–32)
CREAT SERPL-MCNC: 0.95 MG/DL (ref 0.6–1.3)
CRP SERPL QL: <1 MG/L
EBV EA IGG SER QL IA: NEGATIVE
EBV NA IGG SER QL IA: POSITIVE
EBV VCA IGG SER QL IA: POSITIVE
EBV VCA IGM SER QL IA: NEGATIVE
ERYTHROCYTE [DISTWIDTH] IN BLOOD BY AUTOMATED COUNT: 12 % (ref 11.6–15.1)
GFR SERPL CREATININE-BSD FRML MDRD: 81 ML/MIN/1.73SQ M
GLUCOSE P FAST SERPL-MCNC: 97 MG/DL (ref 65–99)
HCT VFR BLD AUTO: 38.4 % (ref 34.8–46.1)
HGB BLD-MCNC: 12.9 G/DL (ref 11.5–15.4)
MCH RBC QN AUTO: 30.9 PG (ref 26.8–34.3)
MCHC RBC AUTO-ENTMCNC: 33.6 G/DL (ref 31.4–37.4)
MCV RBC AUTO: 92 FL (ref 82–98)
PLATELET # BLD AUTO: 297 THOUSANDS/UL (ref 149–390)
PMV BLD AUTO: 9.3 FL (ref 8.9–12.7)
POTASSIUM SERPL-SCNC: 3.9 MMOL/L (ref 3.5–5.3)
PROT SERPL-MCNC: 7 G/DL (ref 6.4–8.4)
RBC # BLD AUTO: 4.17 MILLION/UL (ref 3.81–5.12)
RH BLD: POSITIVE
SODIUM SERPL-SCNC: 139 MMOL/L (ref 135–147)
SPECIMEN EXPIRATION DATE: NORMAL
TSH SERPL DL<=0.05 MIU/L-ACNC: 2.94 UIU/ML (ref 0.45–4.5)
WBC # BLD AUTO: 4.66 THOUSAND/UL (ref 4.31–10.16)

## 2024-12-18 PROCEDURE — 87468 ANAPLSMA PHGCYTOPHLM AMP PRB: CPT

## 2024-12-18 PROCEDURE — 85027 COMPLETE CBC AUTOMATED: CPT

## 2024-12-18 PROCEDURE — 86663 EPSTEIN-BARR ANTIBODY: CPT

## 2024-12-18 PROCEDURE — 87484 EHRLICHA CHAFFEENSIS AMP PRB: CPT | Performed by: STUDENT IN AN ORGANIZED HEALTH CARE EDUCATION/TRAINING PROGRAM

## 2024-12-18 PROCEDURE — 86665 EPSTEIN-BARR CAPSID VCA: CPT

## 2024-12-18 PROCEDURE — 86225 DNA ANTIBODY NATIVE: CPT

## 2024-12-18 PROCEDURE — 86140 C-REACTIVE PROTEIN: CPT

## 2024-12-18 PROCEDURE — 36415 COLL VENOUS BLD VENIPUNCTURE: CPT

## 2024-12-18 PROCEDURE — 86430 RHEUMATOID FACTOR TEST QUAL: CPT

## 2024-12-18 PROCEDURE — 86618 LYME DISEASE ANTIBODY: CPT

## 2024-12-18 PROCEDURE — 87478 BORRELIA MIYAMOTOI AMP PRB: CPT | Performed by: STUDENT IN AN ORGANIZED HEALTH CARE EDUCATION/TRAINING PROGRAM

## 2024-12-18 PROCEDURE — 86200 CCP ANTIBODY: CPT

## 2024-12-18 PROCEDURE — 82785 ASSAY OF IGE: CPT

## 2024-12-18 PROCEDURE — 86664 EPSTEIN-BARR NUCLEAR ANTIGEN: CPT

## 2024-12-18 PROCEDURE — 80053 COMPREHEN METABOLIC PANEL: CPT

## 2024-12-18 PROCEDURE — 87469 BABESIA MICROTI AMP PRB: CPT | Performed by: STUDENT IN AN ORGANIZED HEALTH CARE EDUCATION/TRAINING PROGRAM

## 2024-12-18 PROCEDURE — 86038 ANTINUCLEAR ANTIBODIES: CPT

## 2024-12-18 PROCEDURE — 86235 NUCLEAR ANTIGEN ANTIBODY: CPT

## 2024-12-18 PROCEDURE — 86003 ALLG SPEC IGE CRUDE XTRC EA: CPT

## 2024-12-18 PROCEDURE — 84443 ASSAY THYROID STIM HORMONE: CPT

## 2024-12-19 ENCOUNTER — RESULTS FOLLOW-UP (OUTPATIENT)
Dept: OBGYN CLINIC | Facility: MEDICAL CENTER | Age: 29
End: 2024-12-19

## 2024-12-19 LAB
BACTERIA UR CULT: NORMAL
CCP AB SER IA-ACNC: 2.4 (ref ?–10)
DSDNA IGG SERPL IA-ACNC: 1.6 IU/ML (ref ?–15)
ENA SS-A AB SER IA-ACNC: <0.5 U/ML (ref ?–10)
ENA SS-B IGG SER IA-ACNC: <0.6 U/ML (ref ?–10)
NUCLEAR IGG SER IA-RTO: 0.6 RATIO (ref ?–1)
RHEUMATOID FACT SER QL LA: NEGATIVE

## 2024-12-20 LAB
ALMOND IGE QN: <0.1 KUA/I (ref 0–0.1)
CASHEW NUT IGE QN: <0.1 KUA/I (ref 0–0.1)
CODFISH IGE QN: <0.1 KUA/I (ref 0–0.1)
EGG WHITE IGE QN: <0.1 KUA/I (ref 0–0.1)
GLUTEN IGE QN: <0.1 KUA/I (ref 0–0.1)
HAZELNUT IGE QN: <0.1 KUA/L (ref 0–0.1)
MILK IGE QN: <0.1 KUA/I (ref 0–0.1)
PEANUT IGE QN: <0.1 KUA/I (ref 0–0.1)
SALMON IGE QN: <0.1 KUA/I (ref 0–0.1)
SCALLOP IGE QN: <0.1 KUA/L (ref 0–0.1)
SESAME SEED IGE QN: <0.1 KUA/I (ref 0–0.1)
SHRIMP IGE QN: <0.1 KUA/L (ref 0–0.1)
SOYBEAN IGE QN: <0.1 KUA/I (ref 0–0.1)
TOTAL IGE SMQN RAST: 18.5 KU/L (ref 0–113)
TUNA IGE QN: <0.1 KUA/I (ref 0–0.1)
WALNUT IGE QN: <0.1 KUA/I (ref 0–0.1)
WHEAT IGE QN: <0.1 KUA/I (ref 0–0.1)

## 2024-12-23 LAB
Lab: NORMAL
Lab: NORMAL
MISCELLANEOUS LAB TEST RESULT: NORMAL

## 2024-12-27 ENCOUNTER — RESULTS FOLLOW-UP (OUTPATIENT)
Dept: FAMILY MEDICINE CLINIC | Facility: MEDICAL CENTER | Age: 29
End: 2024-12-27

## 2025-01-06 ENCOUNTER — PATIENT MESSAGE (OUTPATIENT)
Dept: FAMILY MEDICINE CLINIC | Facility: MEDICAL CENTER | Age: 30
End: 2025-01-06

## 2025-01-07 NOTE — PATIENT COMMUNICATION
Pt returned c all, message in chart relayed.  First available OVL with provider 01/21/25 (I did schedule appt in that slot).  Patient having sxs now and did not want to wait 2 weeks.  Attempted to warm transfer to clerical, not available.  Please call patient back if she is able to come into one of the same days slots for today, she is also off tomorrow so is available anytime.

## 2025-01-09 ENCOUNTER — OFFICE VISIT (OUTPATIENT)
Dept: FAMILY MEDICINE CLINIC | Facility: MEDICAL CENTER | Age: 30
End: 2025-01-09
Payer: COMMERCIAL

## 2025-01-09 VITALS
SYSTOLIC BLOOD PRESSURE: 104 MMHG | TEMPERATURE: 98.4 F | HEIGHT: 65 IN | WEIGHT: 136 LBS | RESPIRATION RATE: 16 BRPM | OXYGEN SATURATION: 99 % | HEART RATE: 67 BPM | BODY MASS INDEX: 22.66 KG/M2 | DIASTOLIC BLOOD PRESSURE: 68 MMHG

## 2025-01-09 DIAGNOSIS — R25.1 TREMOR: ICD-10-CM

## 2025-01-09 DIAGNOSIS — R53.1 GENERALIZED WEAKNESS: Primary | ICD-10-CM

## 2025-01-09 DIAGNOSIS — R42 DIZZINESS: ICD-10-CM

## 2025-01-09 DIAGNOSIS — M79.2 NERVE PAIN: ICD-10-CM

## 2025-01-09 PROCEDURE — 99214 OFFICE O/P EST MOD 30 MIN: CPT

## 2025-01-09 NOTE — PROGRESS NOTES
"Name: Maryse Reid      : 1995      MRN: [id number]  Encounter Provider: ROSA M Villarreal  Encounter Date: 2025   Encounter department: [facility]  :  Assessment & Plan  Generalized weakness  Check MRI brain and C-spine.  BMP due prior to imaging to evaluate renal function.  Follow-up with neurology.    Orders:    Ambulatory Referral to Neurology; Future    MRI brain w wo contrast; Future    MRI cervical spine w wo contrast; Future    Basic metabolic panel; Future    Tremor  Check MRI brain and C-spine.  BMP due prior to imaging to evaluate renal function.  Follow-up with neurology.    Orders:    Ambulatory Referral to Neurology; Future    MRI brain w wo contrast; Future    MRI cervical spine w wo contrast; Future    Basic metabolic panel; Future    Dizziness  Check MRI brain and C-spine.  BMP due prior to imaging to evaluate renal function.  Follow-up with neurology.    Orders:    Ambulatory Referral to Neurology; Future    MRI brain w wo contrast; Future    MRI cervical spine w wo contrast; Future    Basic metabolic panel; Future    Nerve pain  Check MRI brain and C-spine.  BMP due prior to imaging to evaluate renal function.  Follow-up with neurology.    Orders:    Ambulatory Referral to Neurology; Future    MRI brain w wo contrast; Future    MRI cervical spine w wo contrast; Future    Basic metabolic panel; Future           History of Present Illness     29-year-old female presents with complaints of feeling as though her whole body is vibrating, difficulty finding words, dizziness, shooting nerve pain, weakness in right upper and lower extremities, shooting pain down left arm, right hand tremor, feeling as though her right hand is not connected to her body, shooting pain down left-sided back, hot flashes, headaches, visual changes, feeling off balance, burning sensation on skin, feeling like someone is pushing on her right lung, she states \"feels like my cells " "are having a party and I was not invited\".  Symptoms are intermittent, seems to be occurring daily for the past 2 weeks however prior to this were occurring a few times per week.  She denies any alcohol and or drug use. Denies recent medication changes.     She was evaluated in the emergency department in April of last year with similar complaints.  At that time, CTA, CT, MRI, EMG and EEG all returned unremarkable. She was advised to follow-up with neurology which she has not yet done.        Review of Systems   Constitutional: Negative.    HENT: Negative.     Eyes:  Positive for visual disturbance.   Respiratory: Negative.     Cardiovascular: Negative.    Gastrointestinal: Negative.    Endocrine: Negative.    Genitourinary: Negative.    Musculoskeletal: Negative.    Skin: Negative.    Neurological:  Positive for dizziness, tremors, speech difficulty, weakness and headaches. Negative for seizures and facial asymmetry.   Hematological: Negative.    Psychiatric/Behavioral: Negative.         Objective   /68 (BP Location: Left arm, Patient Position: Sitting, Cuff Size: Large)   Pulse 67   Temp 98.4 °F (36.9 °C) (Temporal)   Resp 16   Ht 5' 5\" (1.651 m)   Wt 61.7 kg (136 lb)   SpO2 99%   BMI 22.63 kg/m²      Physical Exam  Vitals and nursing note reviewed.   Constitutional:       General: She is not in acute distress.     Appearance: Normal appearance. She is not ill-appearing.   HENT:      Head: Normocephalic and atraumatic.   Eyes:      Extraocular Movements: Extraocular movements intact.      Conjunctiva/sclera: Conjunctivae normal.      Pupils: Pupils are equal, round, and reactive to light.   Cardiovascular:      Rate and Rhythm: Normal rate and regular rhythm.      Pulses: Normal pulses.      Heart sounds: Normal heart sounds.   Pulmonary:      Effort: Pulmonary effort is normal.      Breath sounds: Normal breath sounds.   Abdominal:      General: Abdomen is flat. Bowel sounds are normal.      " Palpations: Abdomen is soft.      Tenderness: There is no abdominal tenderness. There is no guarding.   Musculoskeletal:         General: Normal range of motion.      Cervical back: Normal range of motion. No tenderness.   Skin:     General: Skin is warm and dry.   Neurological:      General: No focal deficit present.      Mental Status: She is alert and oriented to person, place, and time. Mental status is at baseline.      GCS: GCS eye subscore is 4. GCS verbal subscore is 5. GCS motor subscore is 6.      Cranial Nerves: No dysarthria or facial asymmetry.      Sensory: Sensation is intact.      Motor: Weakness present. No tremor or seizure activity.      Gait: Gait is intact. Gait normal.      Comments: Strength 5/5 left upper and left lower extremities.  Strength 4/5 right upper and right lower extremities.  No obvious tremor on exam.  Sensation intact to light touch bilateral upper and lower extremities.  Gait intact.     Psychiatric:         Mood and Affect: Mood normal.         Behavior: Behavior normal.         Thought Content: Thought content normal.

## 2025-01-13 ENCOUNTER — TELEPHONE (OUTPATIENT)
Dept: GYNECOLOGY | Facility: CLINIC | Age: 30
End: 2025-01-13

## 2025-01-13 NOTE — TELEPHONE ENCOUNTER
----- Message from Luz PEREA sent at 1/13/2025  9:58 AM EST -----  Regarding: surgery cancellation  Hello! Just reaching out regarding this patient. Her insurance termed on 1/4/2025. I did call to find out if she has current insurance. Patient returned my phone call, she does not have active insurance,  and would like to cancel surgery at this time.     Thank you!

## 2025-01-17 ENCOUNTER — TELEPHONE (OUTPATIENT)
Dept: NEUROLOGY | Facility: CLINIC | Age: 30
End: 2025-01-17

## 2025-01-17 NOTE — TELEPHONE ENCOUNTER
Called patient to confirm upcoming appointment on 1/23/25  with Jacek Cameron in the [place] office. LMOM

## 2025-01-23 ENCOUNTER — OFFICE VISIT (OUTPATIENT)
Dept: NEUROLOGY | Facility: CLINIC | Age: 30
End: 2025-01-23
Payer: COMMERCIAL

## 2025-01-23 VITALS
HEART RATE: 65 BPM | TEMPERATURE: 97.8 F | SYSTOLIC BLOOD PRESSURE: 118 MMHG | WEIGHT: 135.2 LBS | HEIGHT: 65 IN | DIASTOLIC BLOOD PRESSURE: 76 MMHG | OXYGEN SATURATION: 95 % | BODY MASS INDEX: 22.53 KG/M2

## 2025-01-23 DIAGNOSIS — R53.1 GENERALIZED WEAKNESS: ICD-10-CM

## 2025-01-23 DIAGNOSIS — M79.2 NERVE PAIN: ICD-10-CM

## 2025-01-23 DIAGNOSIS — G43.109 MIGRAINE WITH AURA AND WITHOUT STATUS MIGRAINOSUS, NOT INTRACTABLE: Primary | ICD-10-CM

## 2025-01-23 DIAGNOSIS — R25.1 TREMOR: ICD-10-CM

## 2025-01-23 DIAGNOSIS — R42 DIZZINESS: ICD-10-CM

## 2025-01-23 PROCEDURE — 99204 OFFICE O/P NEW MOD 45 MIN: CPT | Performed by: NURSE PRACTITIONER

## 2025-01-23 RX ORDER — RIMEGEPANT SULFATE 75 MG/75MG
75 TABLET, ORALLY DISINTEGRATING ORAL AS NEEDED
Qty: 8 TABLET | Refills: 5 | Status: SHIPPED | OUTPATIENT
Start: 2025-01-23

## 2025-01-23 RX ORDER — TOPIRAMATE 25 MG/1
TABLET, FILM COATED ORAL
Qty: 120 TABLET | Refills: 1 | Status: SHIPPED | OUTPATIENT
Start: 2025-01-23

## 2025-01-23 NOTE — PROGRESS NOTES
Neurology Ambulatory Visit  Name: Maryse Reid       : 1995       MRN: [id number]   Encounter Provider: Klaus Holliday MA   Encounter Date: 2025  Encounter department: [facility] [place]    Assessment and Plan  1. Migraine with aura and without status migrainosus, not intractable  -     topiramate (Topamax) 25 mg tablet; Start with 1 tablet at bedtime for 5 days and then increase to 2 tablets at bedtime for 5 days and then 3 tablets at bedtime for 5 days and then 4 tablets at bedtime  -     rimegepant sulfate (Nurtec) 75 mg TBDP; Take 1 tablet (75 mg total) by mouth as needed (migraine) Limit of 1 in 24 hours  2. Generalized weakness  -     Ambulatory Referral to Neurology  3. Tremor  -     Ambulatory Referral to Neurology  4. Dizziness  -     Ambulatory Referral to Neurology  5. Nerve pain  -     Ambulatory Referral to Neurology    Patient Instructions/Plan:  Start topamax daily and we will trial getting you nurtec as needed.  Let us know in 4 weeks how you are doing  Follow up in 2-3 months time.    She will Return in about 2 months (around 3/23/2025).    History of Present Illness     HPI   Maryse Reid is a 29 y.o. female who presents for new patient evaluation of migraine headache with complex aura. Headaches are primarily right sided associated with other symptoms outlined in ROS,and are occurring daily She was referred by primary care; I also reviewed previous hospital visit from -4/10/2024 where workup for stroke like symptoms was completed. She was recently reordered brain/c spine imaging by primary care. She states she has had migraine headaches since a young age; she has had other previous hospital visits for unexplained symptoms (thought she had thoracic outlet syndrome at one point in college). She did have a horseback riding accident in .She has been seen by rheumatology without any significant evidence of rheumatological disorder. She states  family history of HTN, DM and Alzheimers. She has an IUD. She works as a dialysis nurse. She denies tobacco/drug use; social alcohol user. She gets botox in the masseter which helps some for jaw pain/headache; she drinks 90-120oz water/day; she exercises regularly with light cardio and weight training.She states sleeping 6-8 hours/night with some anxiety. She has been tried on gabapentin and prozac previously but no other preventative treatments specific to migraine.     MRI brain 4/8/2024:  BRAIN PARENCHYMA:  There is no discrete mass, mass effect or midline shift. There is no intracranial hemorrhage.  Normal posterior fossa.  Diffusion imaging is unremarkable.  There are no white matter changes in the cerebral hemispheres.  Postcontrast imaging of the brain demonstrates no abnormal enhancement.  VENTRICLES:  Normal for the patient's age.  SELLA AND PITUITARY GLAND:  Normal.  ORBITS:  Normal.  PARANASAL SINUSES: Stable left maxillary sinus retention cyst.  VASCULATURE:  Evaluation of the major intracranial vasculature demonstrates appropriate flow voids.  CALVARIUM AND SKULL BASE:  Normal.  EXTRACRANIAL SOFT TISSUES:  Normal.    Lab Results   Component Value Date    WBC 4.66 12/18/2024    HGB 12.9 12/18/2024    HCT 38.4 12/18/2024    MCV 92 12/18/2024     12/18/2024     Lab Results   Component Value Date    SODIUM 139 12/18/2024    K 3.9 12/18/2024     12/18/2024    CO2 28 12/18/2024    AGAP 4 12/18/2024    BUN 10 12/18/2024    CREATININE 0.95 12/18/2024    GLUC 87 10/07/2024    GLUF 97 12/18/2024    CALCIUM 9.2 12/18/2024    AST 8 (L) 12/18/2024    ALT 6 (L) 12/18/2024    ALKPHOS 50 12/18/2024    TP 7.0 12/18/2024    TBILI 0.83 12/18/2024    EGFR 81 12/18/2024     Lab Results   Component Value Date    HQB8LGQKXEWM 2.943 12/18/2024    TSH 2.19 01/04/2022     Hospital history:  past medical history of ADHD, anxiety, depression, GERD who presented as a stroke alert on 4/8/2024 at 12:03 PM with initial  "presenting deficits of dysarthria and bilateral upper extremity tremors.  Initial BP elevated at 160/72, fingerstick blood glucose 110.  Initial NIHSS 1 for mild to moderate dysarthria and a last known well at 8:30 AM on day of presentation.  Initial stroke imaging consisting of CT head and CTA H/N without any intracranial or cerebrovascular pathology.  She was not a TNK candidate secondary for nondisabling symptoms.  She was admitted for MRI brain with and without contrast which was negative.  Neurological exam revealed a staggering dysarthric speech which was noted to wax and wane in both severity and intensity during her stay.  She also complained of a \"disconnect tween her right arm and her brain\" and was noted to have difficulty with fine movements and dexterity that improved with distraction.     Review of Systems   Constitutional: Negative.    HENT: Negative.     Eyes: Negative.    Respiratory: Negative.     Cardiovascular: Negative.    Gastrointestinal:  Positive for nausea.   Endocrine: Negative.    Genitourinary: Negative.    Musculoskeletal:  Positive for back pain.   Skin: Negative.    Allergic/Immunologic: Negative.    Neurological:  Positive for dizziness, tremors, weakness, light-headedness, numbness and headaches (every day).   Hematological: Negative.    Psychiatric/Behavioral: Negative.     Sensitivity to light, noise and strong smells  Balance is not the greatest - neg on falls  Burning sensation on arms, back and legs  Muscle spasms on right side- 1/11/25 was the last  Symptoms started approx 1 year ago    Current Outpatient Medications on File Prior to Visit   Medication Sig Dispense Refill    cyclobenzaprine (FLEXERIL) 10 mg tablet Take 1 tablet (10 mg total) by mouth 2 (two) times a day as needed for muscle spasms 20 tablet 0    fluticasone (FLONASE) 50 mcg/act nasal spray 1 spray into each nostril daily 9.9 mL 0    ibuprofen (MOTRIN) 800 mg tablet Take 1 tablet (800 mg total) by mouth every 8 " "(eight) hours as needed for mild pain 21 tablet 0    Iron-Vitamin C (Vitron-C)  MG TABS Take 1 tablet by mouth 2 (two) times a day 60 tablet 2    Magnesium Gluconate 550 MG TABS Take 400 mg by mouth 2 (two) times a day      VITAMIN D PO Take by mouth       No current facility-administered medications on file prior to visit.      Social History     Tobacco Use    Smoking status: Some Days     Current packs/day: 0.25     Average packs/day: 0.3 packs/day for 3.1 years (0.8 ttl pk-yrs)     Types: Cigarettes     Start date: 2022     Passive exposure: Past    Smokeless tobacco: Never   Vaping Use    Vaping status: Never Used   Substance and Sexual Activity    Alcohol use: Yes     Alcohol/week: 1.0 standard drink of alcohol     Types: 1 Glasses of wine per week     Comment: socially weekly    Drug use: Never    Sexual activity: Yes     Partners: Male     Birth control/protection: I.U.D.     Comment: history STD-chlamydia     Past Medical History:   Diagnosis Date    Abdominal pain     with nausea    ADHD     Anemia     Anxiety     Chlamydia     Diarrhea     GERD (gastroesophageal reflux disease)     Migraine     Mononucleosis     Ovarian cyst     PONV (postoperative nausea and vomiting)      Allergies   Allergen Reactions    Amoxicillin-Pot Clavulanate Facial Swelling     Other Reaction(s): Facial Swelling         Objective     /76 (BP Location: Right arm, Patient Position: Sitting, Cuff Size: Standard)   Pulse 65   Temp 97.8 °F (36.6 °C) (Temporal)   Ht 5' 5\" (1.651 m)   Wt 61.3 kg (135 lb 3.2 oz)   SpO2 95%   BMI 22.50 kg/m²    Physical Exam  Vitals reviewed.   Constitutional:       General: She is not in acute distress.  HENT:      Head: Normocephalic and atraumatic.      Right Ear: External ear normal.      Left Ear: External ear normal.      Nose: Nose normal.      Mouth/Throat:      Mouth: Mucous membranes are moist.      Pharynx: Oropharynx is clear.   Eyes:      General:         Right eye: No " discharge.         Left eye: No discharge.      Pupils: Pupils are equal, round, and reactive to light.   Cardiovascular:      Rate and Rhythm: Normal rate and regular rhythm.      Pulses: Normal pulses.      Heart sounds: Normal heart sounds.   Pulmonary:      Effort: Pulmonary effort is normal.      Breath sounds: Normal breath sounds.   Abdominal:      General: There is no distension.      Tenderness: There is no abdominal tenderness.   Musculoskeletal:      Cervical back: Normal range of motion.      Right lower leg: No edema.      Left lower leg: No edema.   Skin:     General: Skin is warm.      Capillary Refill: Capillary refill takes less than 2 seconds.      Findings: No rash.   Neurological:      Mental Status: She is alert. Mental status is at baseline.      Cranial Nerves: No cranial nerve deficit.      Sensory: No sensory deficit.      Motor: No weakness.      Coordination: Coordination normal.      Gait: Gait normal.      Deep Tendon Reflexes: Reflexes normal.   Psychiatric:         Mood and Affect: Mood normal.         Behavior: Behavior normal.       Neurological Exam  Mental Status  Alert.    Cranial Nerves  CN III, IV, VI: Pupils equal round and reactive to light bilaterally.    Gait   Normal gait.

## 2025-01-23 NOTE — PATIENT INSTRUCTIONS
Start topamax daily and we will trial getting you nurtec as needed.  Let us know in 4 weeks how you are doing  Follow up in 2-3 months time.

## 2025-01-31 ENCOUNTER — TELEPHONE (OUTPATIENT)
Age: 30
End: 2025-01-31

## 2025-01-31 NOTE — TELEPHONE ENCOUNTER
Pt called back, returning our call.      She states that she previously was getting botox for this thru [facility] for this.  And she was paying out of pocket.      She was chewing the other day and her jaw kind of locked up.  Had Really bad shooting pain into her cheek and causing headaches and pain into her ear and to her neck.      She is taking topiramate 25mg 1 tab at hs.  States that she is Knocked out by 5-6pm every night. States that she is sleeping 10-13 hours a day.    She feels a little chemically depressed. Not suicidal but definitely noticed a change in her mood.   Today is the best she felt.    Also states that she has an IUD and also maybe hormonal.     Advised that there should be no issue with her titrating slower and advised that if she has worsening mood symptoms or suicidal thought she can stop taking topamax.     Please advise  [phone] to leave detailed message or ok to send mychart.

## 2025-02-05 ENCOUNTER — NURSE TRIAGE (OUTPATIENT)
Age: 30
End: 2025-02-05

## 2025-02-07 ENCOUNTER — TELEPHONE (OUTPATIENT)
Age: 30
End: 2025-02-07

## 2025-02-07 NOTE — PATIENT COMMUNICATION
ROSA M Macias to Me       2/6/25  4:06 PM  Can we please make sure prior auth for nurtec was done for this patient for migraine abortive use  Thanks  Jacek

## 2025-02-07 NOTE — TELEPHONE ENCOUNTER
Is This A New Presentation, Or A Follow-Up?: Scar Follow Up
Nurtec PA completed on [facility]    Received immediate approval on CM  Your request has been approved  CaseId:[id number];Status:Approved;Review Type:Prior Auth;Coverage Start Date:01/19/2025;Coverage End Date:02/07/2026;     Left message for pharm making them aware of approval.    PIERIS [employer] message sent to pt.   
Nurtec PA started on [facility]  Key: [id number]    One of the questions is-  Has the patient tried at least one triptan therapy?*     I do not see that pt has tired a triptan.  Please advise  
ROSA M Macias to Me       2/6/25  4:06 PM  Can we please make sure prior auth for nurtec was done for this patient for migraine abortive use  Thanks  Jacek  
ROSA M Macias to Me       2/7/25 11:08 AM  Complex aura/stroke like symptoms- triptans contraindicated.  -Jacek  
WDL

## 2025-02-10 ENCOUNTER — TELEPHONE (OUTPATIENT)
Dept: NEUROLOGY | Facility: CLINIC | Age: 30
End: 2025-02-10

## 2025-02-10 NOTE — PATIENT COMMUNICATION
Inbound call received from Kelle responding to missed call from Dr. Gustavo Reina. Patient made aware Dr. Gustavo Reina advised to stop Topamax. Please see triage for questions.     Patient made aware some questions may be best answered by her Jacek MEDEROS tomorrow as he knows her history. Patient would like to at least address if Topamax needs to be titrated off.       Patient confirmed [phone]  is her best call back and we may leave a detailed voicemail. Gregtent made aware we have an on call service for emergencies after office is closed if she needs to follow up.     Dr. Gustavo Reina contacted in [employer] system Secure Chat, please assist, thank you!    Jacek MEDEROS Please advise, thank you!

## 2025-02-10 NOTE — TELEPHONE ENCOUNTER
Called and spoke to patient. New onset rash throughout the body. Potentially feeling like a flushing sensation. Patient with recent increase to Topamax. Denies preceding illness. Advised to immediately discontinue the medication out of concern for SJS. Advised to follow up with neurology provider. If sx worsen or fail to improve recommend going to the nearest ED.

## 2025-02-10 NOTE — TELEPHONE ENCOUNTER
"Reason for Disposition  • [1] Caller has URGENT medicine question about med that PCP or specialist prescribed AND [2] triager unable to answer question    Answer Assessment - Initial Assessment Questions  1. NAME of MEDICINE: \"What medicine(s) are you calling about?\"      Topamax 50 mg.     2. QUESTION: \"What is your question?\" (e.g., double dose of medicine, side effect)        Patient wants Dr.. Gustavo Reina to call again. Patient to know if it is normal to be itchy from Topamax and why would she have a reaction 3 weeks after starting it. Patient mentioned her bilateral feet were numb and tingling but this was previously discussed with Jacek MEDEROS. Patient asked if the numbness and tingling was also a reaction to Topamax. Patient wants to clarify if titration is needed before stopping Topamax. She has been taking 50 mg Topamax.     3. PRESCRIBER: \"Who prescribed the medicine?\" Reason: if prescribed by specialist, call should be referred to that group. Jacek MEDEROS.         4. SYMPTOMS: \"Do you have any symptoms?\" If Yes, ask: \"What symptoms are you having?\"  \"How bad are the symptoms (e.g., mild, moderate, severe)      No confusion, difficulty breathing, or weakness.    Protocols used: Medication Question Call-Adult-    "

## 2025-02-10 NOTE — PROGRESS NOTES
Rheumatology Follow-up Visit  Name: Maryse Reid      : 1995      MRN: [id number]  Encounter Provider: Bonny Jimenez MD  Encounter Date: 2025   Encounter department: [facility] [address]  :  Assessment & Plan  Arthralgia, unspecified joint  29-year-old female who presents for follow-up.  Patient has been following in rheumatology for positive TEO and low positive centromere antibody without clear unifying diagnosis.  Recently patient has had several new symptoms develop along with recurrence of previous symptoms.  Symptoms today include pain and stiffness of her hands, pain in her low back and pain in the hips.  Pain in her back and hips is more constant and exacerbated by prolonged sitting or standing but is not alleviated by activity.  Description is not entirely inflammatory.  Would recommend ultrasound of the hands and wrists for further evaluation to see if there is any underlying inflammatory symptoms.  Recent repeat serologic testing resulted in negative TEO negative centromere antibody.  Will hold off on any additional lab testing at this point in time.  Will arrange for follow-up in 2 months.  Orders:    US RA Hands/Wrists; Future    Allergy, subsequent encounter  New onset allergies to multiple different foods and antibiotics over the past 1 year.  Recommend allergy evaluation.  Orders:    Ambulatory Referral to Allergy; Future    Weakness of left foot  Subtle left foot dorsiflexion weakness.  Agree with MRI brain and C-spine as previously ordered by PCP.  Would also recommend EMG for further evaluation.  Patient will also be following up with neurology as previously scheduled.  Orders:    EMG; Future    Perioral dermatitis  Discussed with patient that facial rash is most consistent with perioral dermatitis.  Recommend follow-up with dermatology for treatment.  Referral was provided.  Orders:    Ambulatory Referral to Dermatology; Future        History of Present  "Illness   HPI  Maryse Reid is a 29 y.o. female who presents for follow-up.    Interval History:  Patient reports in December she had a flareup of symptoms.  She reports she was having a low-grade temperature, body aches specifically in her spine, hips, and hands.  She also reports rash both on the face and chest.  On the face clustered around her mouth and nose.  Occasionally gets flat erythematous rash on chest.  Additionally reports multiple neurologic symptoms including numbness and tingling as well as intermittent weakness in her legs.  She was seen by neurology who recommended starting Topamax for possible atypical migraine.  She reports she started this and it did help a little bit but by the third week she got diffuse full body itching so she had to stop it.  She has also been having increasing number of night sweats/hot flash.    Additionally, over the past year she reports she has developed multiple allergies.  She reports she is no longer to eat shellfish for example as this causes throat itching like it is going to close and also wheezing.  She reports she is only able to eat about 5 foods because other foods seem to trigger her symptoms.  She also developed new allergies to antibiotics in the past year which she previously had tolerated.  Reports that she is using Benadryl frequently and has to always keep it on her.    Permanent History:  Patient was seen in 2023 with TEO 1:80 and low positive centromere ab of 1.2. Negative APLA, dsDNA, SSA, SSB, Sm, RNP, RF, CCP, Scl-70, RNA-Benigno-3, and Th/To. Her symptoms were fatigue, joint pain, sicca, and Raynaud's. She did undergo salivary gland biopsy in 2023 which was negative for Sjogren's.     Review of Systems  Complete ROS conducted as per HPI.    Objective   /78 (BP Location: Right arm, Patient Position: Sitting, Cuff Size: Adult)   Temp 98.4 °F (36.9 °C) (Tympanic)   Ht 5' 5\" (1.651 m)   Wt 57.6 kg (127 lb)   BMI 21.13 kg/m²      Physical " Exam  Physical Exam  Constitutional: well appearing, no acute distress  HEENT: normocephalic, sclera clear, no visible oral or nasal ulcers  Neck: supple, no palpable cervical adenopathy  CV: regular rate and rhythm  Pulm: normal respiratory effort, breathing comfortably on room air  Skin: + Raised papular rash in the nasolabial creases and around the mouth, periungual erythema  Extremities: warm and well perfused, no edema  MSK: No synovitis or effusion, negative JESSICA bilaterally    Labs and Imaging  I have personally reviewed pertinent labs and imaging.

## 2025-02-10 NOTE — PATIENT COMMUNICATION
"Received secure chat response from Dr. Chen:  \"Tried calling twice. Second time LVM. Overall recommend stopping the Topamax.\"  "

## 2025-02-10 NOTE — PATIENT COMMUNICATION
"Inbound call received from patient.   Pt reported that she is taking the Topamax 50 mg HS but has developed an itching skin rash that started on her back and spread to her whole body including her head. Pt reported she took Benadryl 25 mg PO last evening which helped with the itching but made pt feel \"wired up\" and pt was not able to sleep.     Pt denied the following:  -- any changes with lotions, soaps, perfumes, laundry detergent,medications, including OTC meds, diet     Pt is due for her Topamax 50 mg HS and is asking if she should hold taking the medication. Pt stated that she would take the Benadryl again but advised pt to hold off until she hears back from the office due to the medication causing the pt not to be able to sleep at HS.    Pt is working until 6 pm so can leave a detailed message on machine for pt.   Pt aware that a message would be sent to the on-call provider to review and call the pt back with their recommendations     Secure chat sent to Dr. Chen (on-call Neuro provider) to contact pt with her recommendations.         "

## 2025-02-12 NOTE — TELEPHONE ENCOUNTER
"Patient called to report she is still very itchy. Patient stopped Topamax 2-11-25. Patient reports her entire body itches but not all at the same time. Patient took Benadryl with some relief but it made patient feel,\"wired.\" Advised patient could try Zyrtec.     Patient denied itching in her throat/swelling, difficulty swallowing, or fever. Made patient aware if she experiences these symptoms to call 911 and get to the nearest ED.     [phone]. Ok to leave a detailed message.     Jacek, please advise. Thank you!  "

## 2025-02-13 NOTE — TELEPHONE ENCOUNTER
ROSA M Macias  You1 hour ago (1:33 PM)       Aside from stopping the topamax and making sure not more serious symptoms (thank you for explaining reasons to go to ED) I dont have further recommendations at this time; if rash persists and doesn't slowly get better I would suggest primary care evaluation.  Aric Read

## 2025-02-17 NOTE — TELEPHONE ENCOUNTER
Pt called, returing jose's call.  Made her aware of junior's message.  States that her Tremor and numbness came back   States that she also has a Poor appetie. Tremor is to her r hand termor and numbness is to her R hand, r foot and r side of her body  States that the red Spots did go away  Also Gets really dizzy when she looks down-this is ongoing states that she has a rheum appt tomorrow    She is scheduled to see you for follow up on 3/25.  She is askig if you still want her to keep that appt?    Please advise  [phone] to leave detailed message

## 2025-02-17 NOTE — TELEPHONE ENCOUNTER
ROSA M Macias        Aside from stopping the topamax and making sure not more serious symptoms (thank you for explaining reasons to go to ED) I dont have further recommendations at this time; if rash persists and doesn't slowly get better I would suggest primary care evaluation.  Thanks  Jacek     ___________________________________    I called pt and left a detailed msg per communication consent and read provider's note. I informed if she has any further questions to please call our office.

## 2025-02-17 NOTE — TELEPHONE ENCOUNTER
ROSA M Macias       2/17/25 11:52 AM  Yes please keep upcoming appointment    _______________________    I called pt and left a detailed msg per pts request and informed provider in agreement for pt to keep upcoming appt. I informed if has any other questions to please call our office back and speak to a nurse

## 2025-02-18 ENCOUNTER — OFFICE VISIT (OUTPATIENT)
Age: 30
End: 2025-02-18
Payer: COMMERCIAL

## 2025-02-18 ENCOUNTER — TELEPHONE (OUTPATIENT)
Age: 30
End: 2025-02-18

## 2025-02-18 VITALS
DIASTOLIC BLOOD PRESSURE: 78 MMHG | HEIGHT: 65 IN | WEIGHT: 127 LBS | TEMPERATURE: 98.4 F | BODY MASS INDEX: 21.16 KG/M2 | SYSTOLIC BLOOD PRESSURE: 120 MMHG

## 2025-02-18 DIAGNOSIS — T78.40XD ALLERGY, SUBSEQUENT ENCOUNTER: ICD-10-CM

## 2025-02-18 DIAGNOSIS — R29.898 WEAKNESS OF LEFT FOOT: ICD-10-CM

## 2025-02-18 DIAGNOSIS — L71.0 PERIORAL DERMATITIS: ICD-10-CM

## 2025-02-18 DIAGNOSIS — M25.50 ARTHRALGIA, UNSPECIFIED JOINT: Primary | ICD-10-CM

## 2025-02-18 PROCEDURE — 99214 OFFICE O/P EST MOD 30 MIN: CPT | Performed by: STUDENT IN AN ORGANIZED HEALTH CARE EDUCATION/TRAINING PROGRAM

## 2025-02-18 NOTE — TELEPHONE ENCOUNTER
Received call from Patient to schedule New Patient appt for Skin Check - Perioral dermatitis. Offered first avail 11/2025 Ch[facility], patient rejected as too far out.     Patient verbalized they will go elsewhere for care.

## 2025-02-19 ENCOUNTER — APPOINTMENT (OUTPATIENT)
Dept: LAB | Facility: MEDICAL CENTER | Age: 30
End: 2025-02-19
Payer: COMMERCIAL

## 2025-02-19 DIAGNOSIS — R53.1 GENERALIZED WEAKNESS: ICD-10-CM

## 2025-02-19 DIAGNOSIS — R79.0 LOW FERRITIN: ICD-10-CM

## 2025-02-19 DIAGNOSIS — R25.1 TREMOR: ICD-10-CM

## 2025-02-19 DIAGNOSIS — M79.2 NERVE PAIN: ICD-10-CM

## 2025-02-19 DIAGNOSIS — R42 DIZZINESS: ICD-10-CM

## 2025-02-19 LAB
ANION GAP SERPL CALCULATED.3IONS-SCNC: 7 MMOL/L (ref 4–13)
BASOPHILS # BLD AUTO: 0.04 THOUSANDS/ΜL (ref 0–0.1)
BASOPHILS NFR BLD AUTO: 1 % (ref 0–1)
BUN SERPL-MCNC: 11 MG/DL (ref 5–25)
CALCIUM SERPL-MCNC: 9.7 MG/DL (ref 8.4–10.2)
CHLORIDE SERPL-SCNC: 104 MMOL/L (ref 96–108)
CO2 SERPL-SCNC: 31 MMOL/L (ref 21–32)
CREAT SERPL-MCNC: 0.94 MG/DL (ref 0.6–1.3)
EOSINOPHIL # BLD AUTO: 0.03 THOUSAND/ΜL (ref 0–0.61)
EOSINOPHIL NFR BLD AUTO: 0 % (ref 0–6)
ERYTHROCYTE [DISTWIDTH] IN BLOOD BY AUTOMATED COUNT: 12.1 % (ref 11.6–15.1)
FERRITIN SERPL-MCNC: 12 NG/ML (ref 11–307)
GFR SERPL CREATININE-BSD FRML MDRD: 82 ML/MIN/1.73SQ M
GLUCOSE SERPL-MCNC: 97 MG/DL (ref 65–140)
HCT VFR BLD AUTO: 39.2 % (ref 34.8–46.1)
HGB BLD-MCNC: 13.6 G/DL (ref 11.5–15.4)
IMM GRANULOCYTES # BLD AUTO: 0.03 THOUSAND/UL (ref 0–0.2)
IMM GRANULOCYTES NFR BLD AUTO: 0 % (ref 0–2)
IRON SATN MFR SERPL: 21 % (ref 15–50)
IRON SERPL-MCNC: 82 UG/DL (ref 50–212)
LYMPHOCYTES # BLD AUTO: 1.88 THOUSANDS/ΜL (ref 0.6–4.47)
LYMPHOCYTES NFR BLD AUTO: 26 % (ref 14–44)
MCH RBC QN AUTO: 31.5 PG (ref 26.8–34.3)
MCHC RBC AUTO-ENTMCNC: 34.7 G/DL (ref 31.4–37.4)
MCV RBC AUTO: 91 FL (ref 82–98)
MONOCYTES # BLD AUTO: 0.47 THOUSAND/ΜL (ref 0.17–1.22)
MONOCYTES NFR BLD AUTO: 6 % (ref 4–12)
NEUTROPHILS # BLD AUTO: 4.93 THOUSANDS/ΜL (ref 1.85–7.62)
NEUTS SEG NFR BLD AUTO: 67 % (ref 43–75)
NRBC BLD AUTO-RTO: 0 /100 WBCS
PLATELET # BLD AUTO: 293 THOUSANDS/UL (ref 149–390)
PMV BLD AUTO: 10.1 FL (ref 8.9–12.7)
POTASSIUM SERPL-SCNC: 4.2 MMOL/L (ref 3.5–5.3)
RBC # BLD AUTO: 4.32 MILLION/UL (ref 3.81–5.12)
SODIUM SERPL-SCNC: 142 MMOL/L (ref 135–147)
TIBC SERPL-MCNC: 392 UG/DL (ref 250–450)
TRANSFERRIN SERPL-MCNC: 280 MG/DL (ref 203–362)
UIBC SERPL-MCNC: 310 UG/DL (ref 155–355)
WBC # BLD AUTO: 7.38 THOUSAND/UL (ref 4.31–10.16)

## 2025-02-19 PROCEDURE — 83550 IRON BINDING TEST: CPT

## 2025-02-19 PROCEDURE — 82728 ASSAY OF FERRITIN: CPT

## 2025-02-19 PROCEDURE — 85025 COMPLETE CBC W/AUTO DIFF WBC: CPT

## 2025-02-19 PROCEDURE — 83540 ASSAY OF IRON: CPT

## 2025-02-19 PROCEDURE — 36415 COLL VENOUS BLD VENIPUNCTURE: CPT

## 2025-02-19 PROCEDURE — 80048 BASIC METABOLIC PNL TOTAL CA: CPT

## 2025-02-20 ENCOUNTER — RESULTS FOLLOW-UP (OUTPATIENT)
Dept: FAMILY MEDICINE CLINIC | Facility: MEDICAL CENTER | Age: 30
End: 2025-02-20

## 2025-02-20 NOTE — TELEPHONE ENCOUNTER
ROSA M Macias to Me     2/11/25 11:09 AM   If concern for allergy to topamax she can stop it, no need to titrate off  Please let me know if she would like to try a different preventative such as nortriptyline  -Jacek  -------------------------------    Per 02/10/2025 encounter Patient stopped topamax 02/11/2025.

## 2025-02-24 ENCOUNTER — OFFICE VISIT (OUTPATIENT)
Dept: URGENT CARE | Facility: CLINIC | Age: 30
End: 2025-02-24
Payer: COMMERCIAL

## 2025-02-24 VITALS
TEMPERATURE: 98.2 F | DIASTOLIC BLOOD PRESSURE: 64 MMHG | HEART RATE: 80 BPM | BODY MASS INDEX: 21.16 KG/M2 | OXYGEN SATURATION: 99 % | WEIGHT: 127 LBS | RESPIRATION RATE: 16 BRPM | HEIGHT: 65 IN | SYSTOLIC BLOOD PRESSURE: 104 MMHG

## 2025-02-24 DIAGNOSIS — J02.9 SORE THROAT: Primary | ICD-10-CM

## 2025-02-24 LAB — S PYO AG THROAT QL: NEGATIVE

## 2025-02-24 PROCEDURE — 87070 CULTURE OTHR SPECIMN AEROBIC: CPT | Performed by: ORTHOPAEDIC SURGERY

## 2025-02-24 PROCEDURE — 87880 STREP A ASSAY W/OPTIC: CPT | Performed by: ORTHOPAEDIC SURGERY

## 2025-02-24 PROCEDURE — 99213 OFFICE O/P EST LOW 20 MIN: CPT | Performed by: ORTHOPAEDIC SURGERY

## 2025-02-24 RX ORDER — CEPHALEXIN 500 MG/1
500 CAPSULE ORAL EVERY 12 HOURS SCHEDULED
Qty: 20 CAPSULE | Refills: 0 | Status: SHIPPED | OUTPATIENT
Start: 2025-02-24 | End: 2025-03-06

## 2025-02-24 NOTE — PATIENT INSTRUCTIONS
"If Strep is suspected we may have obtained a throat swab from you today, which will be sent to our lab for culture. Our office will contact you once the culture results are available if there needs to be a change in treatment plan. Alternatively, you may follow your results on MyChart. In the interim, begin antibiotics as prescribed.   For pain relief you may try:  Warm water and salt gargles  Chloraseptic spray  Cepacol lozenges  \"Throat Coat\" tea  Over-the-counter Tylenol/ibuprofen for pain and fever  Stay well hydrated and get plenty of rest  Following completion of antibiotics it may be beneficial to throw out your toothbrush for a new one as it is possible to re-infect yourself  Follow up with your PCP in 3-5 days  Proceed to ER if symptoms worsen      "

## 2025-02-25 ENCOUNTER — RESULTS FOLLOW-UP (OUTPATIENT)
Dept: HEMATOLOGY ONCOLOGY | Facility: CLINIC | Age: 30
End: 2025-02-25

## 2025-02-26 ENCOUNTER — HOSPITAL ENCOUNTER (OUTPATIENT)
Dept: MRI IMAGING | Facility: HOSPITAL | Age: 30
Discharge: HOME | End: 2025-02-26
Payer: COMMERCIAL

## 2025-02-26 DIAGNOSIS — R25.1 TREMOR: ICD-10-CM

## 2025-02-26 DIAGNOSIS — R42 DIZZINESS: ICD-10-CM

## 2025-02-26 DIAGNOSIS — M79.2 NERVE PAIN: ICD-10-CM

## 2025-02-26 DIAGNOSIS — R53.1 GENERALIZED WEAKNESS: ICD-10-CM

## 2025-02-26 PROCEDURE — A9585 GADOBUTROL INJECTION: HCPCS

## 2025-02-26 PROCEDURE — 70553 MRI BRAIN STEM W/O & W/DYE: CPT

## 2025-02-26 PROCEDURE — 72156 MRI NECK SPINE W/O & W/DYE: CPT

## 2025-02-26 RX ORDER — GADOBUTROL 604.72 MG/ML
5 INJECTION INTRAVENOUS
Status: COMPLETED | OUTPATIENT
Start: 2025-02-26 | End: 2025-02-26

## 2025-02-26 RX ADMIN — GADOBUTROL 5 ML: 604.72 INJECTION INTRAVENOUS at 20:14

## 2025-02-27 LAB — BACTERIA THROAT CULT: NORMAL

## 2025-03-03 ENCOUNTER — APPOINTMENT (OUTPATIENT)
Dept: URGENT CARE | Facility: CLINIC | Age: 30
End: 2025-03-03
Payer: OTHER MISCELLANEOUS

## 2025-03-03 ENCOUNTER — OCCMED (OUTPATIENT)
Dept: URGENT CARE | Facility: CLINIC | Age: 30
End: 2025-03-03
Payer: OTHER MISCELLANEOUS

## 2025-03-03 DIAGNOSIS — S29.012A UPPER BACK STRAIN, INITIAL ENCOUNTER: Primary | ICD-10-CM

## 2025-03-03 PROCEDURE — 99283 EMERGENCY DEPT VISIT LOW MDM: CPT

## 2025-03-03 PROCEDURE — G0382 LEV 3 HOSP TYPE B ED VISIT: HCPCS

## 2025-03-11 ENCOUNTER — HOSPITAL ENCOUNTER (OUTPATIENT)
Dept: RADIOLOGY | Facility: HOSPITAL | Age: 30
Discharge: HOME | End: 2025-03-11
Attending: STUDENT IN AN ORGANIZED HEALTH CARE EDUCATION/TRAINING PROGRAM
Payer: COMMERCIAL

## 2025-03-11 DIAGNOSIS — M25.50 ARTHRALGIA, UNSPECIFIED JOINT: ICD-10-CM

## 2025-03-11 PROCEDURE — 76882 US LMTD JT/FCL EVL NVASC XTR: CPT

## 2025-03-12 ENCOUNTER — RESULTS FOLLOW-UP (OUTPATIENT)
Age: 30
End: 2025-03-12

## 2025-03-21 ENCOUNTER — APPOINTMENT (OUTPATIENT)
Dept: URGENT CARE | Facility: CLINIC | Age: 30
End: 2025-03-21
Payer: OTHER MISCELLANEOUS

## 2025-03-21 PROCEDURE — 99214 OFFICE O/P EST MOD 30 MIN: CPT | Performed by: FAMILY MEDICINE

## 2025-03-25 ENCOUNTER — OFFICE VISIT (OUTPATIENT)
Dept: NEUROLOGY | Facility: CLINIC | Age: 30
End: 2025-03-25
Payer: COMMERCIAL

## 2025-03-25 VITALS
BODY MASS INDEX: 22.06 KG/M2 | WEIGHT: 132.4 LBS | DIASTOLIC BLOOD PRESSURE: 72 MMHG | TEMPERATURE: 97.8 F | SYSTOLIC BLOOD PRESSURE: 108 MMHG | OXYGEN SATURATION: 99 % | HEIGHT: 65 IN | HEART RATE: 80 BPM

## 2025-03-25 DIAGNOSIS — G43.109 MIGRAINE WITH AURA AND WITHOUT STATUS MIGRAINOSUS, NOT INTRACTABLE: Primary | ICD-10-CM

## 2025-03-25 PROCEDURE — 99213 OFFICE O/P EST LOW 20 MIN: CPT | Performed by: NURSE PRACTITIONER

## 2025-03-25 RX ORDER — PIMECROLIMUS 10 MG/G
CREAM TOPICAL
COMMUNITY
Start: 2025-03-07

## 2025-03-25 NOTE — PATIENT INSTRUCTIONS
Can start over the counter allergy medication and make sure to follow up rc allergist  Use the nurtec at onset of headache and let me know if it helps  Recent MRI testing was reviewed and no significant findings  Follow up 4 months

## 2025-03-25 NOTE — PROGRESS NOTES
Name: Maryse Reid      : 1995      MRN: [id number]  Encounter Provider: ROSA M Amado  Encounter Date: 3/25/2025   Encounter department: [facility] [place]  :  Assessment & Plan  Migraine with aura and without status migrainosus, not intractable           Patient Instructions   Can start over the counter allergy medication and make sure to follow up iwith allergist  Use the nurtec at onset of headache and let me know if it helps  Recent MRI testing was reviewed and no significant findings  Follow up 4 months     History of Present Illness   Maryse Reid presents for migraine follow up, last office visit 2025.  She has had phone calls to the office since the visit and she had allergy reaction to topamax and she also states she cannot eat shellfish anymore because of allergy symptoms. She is hesitant to start a different preventative  She states current migraine frequency is 2x/week. She denies change in headache presentation. She does have right neck/trapezius tenderness and has had chiropractor/PT eval. She also had rheumatology evaluation recently and has EMG scheduled-records reviewed. She now works travel nursing. She states sleep can be interrupted by night sweats.    MRI brain/c spine 2025:  BRAIN PARENCHYMA:  There is no discrete mass, mass effect or midline shift. There is no intracranial hemorrhage.  Normal posterior fossa.  Diffusion imaging is unremarkable.  There are no white matter changes in the cerebral hemispheres.  Postcontrast imaging of the brain demonstrates no abnormal enhancement.  VENTRICLES:  Normal for the patient's age.  SELLA AND PITUITARY GLAND:  Normal.  ORBITS:  Normal.  PARANASAL SINUSES:  Normal.  VASCULATURE:  Evaluation of the major intracranial vasculature demonstrates appropriate flow voids.  CALVARIUM AND SKULL BASE:  Normal.  EXTRACRANIAL SOFT TISSUES:  Normal.    IMPRESSION:  1. No cervical spinal stenosis, cord  compression, or abnormal cord signal.  2. Partially visualized disc extrusion at T3-T4 with mild mass effect on the cord.  3. Normal cord signal. No enhancing lesion seen.    Previous History:  Headaches are primarily right sided associated with other symptoms outlined in ROS,and are occurring daily She was referred by primary care; I also reviewed previous hospital visit from 4/8-4/10/2024 where workup for stroke like symptoms was completed. She was recently reordered brain/c spine imaging by primary care. She states she has had migraine headaches since a young age; she has had other previous hospital visits for unexplained symptoms (thought she had thoracic outlet syndrome at one point in college). She did have a horseback riding accident in 2022.She has been seen by rheumatology without any significant evidence of rheumatological disorder. She states family history of HTN, DM and Alzheimers. She has an IUD. She works as a dialysis nurse. She denies tobacco/drug use; social alcohol user. She gets botox in the masseter which helps some for jaw pain/headache; she drinks 90-120oz water/day; she exercises regularly with light cardio and weight training.She states sleeping 6-8 hours/night with some anxiety. She has been tried on gabapentin and prozac previously but no other preventative treatments specific to migraine.     Hospital history:  past medical history of ADHD, anxiety, depression, GERD who presented as a stroke alert on 4/8/2024 at 12:03 PM with initial presenting deficits of dysarthria and bilateral upper extremity tremors.  Initial BP elevated at 160/72, fingerstick blood glucose 110.  Initial NIHSS 1 for mild to moderate dysarthria and a last known well at 8:30 AM on day of presentation.  Initial stroke imaging consisting of CT head and CTA H/N without any intracranial or cerebrovascular pathology.  She was not a TNK candidate secondary for nondisabling symptoms.  She was admitted for MRI brain with and  "without contrast which was negative.  Neurological exam revealed a staggering dysarthric speech which was noted to wax and wane in both severity and intensity during her stay.  She also complained of a \"disconnect tween her right arm and her brain\" and was noted to have difficulty with fine movements and dexterity that improved with distraction.     HPI   Review of Systems   Constitutional: Negative.    HENT: Negative.     Eyes: Negative.    Respiratory: Negative.     Cardiovascular: Negative.    Gastrointestinal: Negative.    Endocrine: Negative.    Genitourinary: Negative.    Musculoskeletal: Negative.    Skin: Negative.    Allergic/Immunologic: Negative.    Neurological:  Positive for dizziness, tremors (subsided), weakness (left leg), numbness (feet) and headaches (2x a week).   Hematological: Negative.    Psychiatric/Behavioral: Negative.        I have personally reviewed the MA's review of systems and made changes as necessary.    Current Outpatient Medications on File Prior to Visit   Medication Sig Dispense Refill    cyclobenzaprine (FLEXERIL) 10 mg tablet Take 1 tablet (10 mg total) by mouth 2 (two) times a day as needed for muscle spasms 20 tablet 0    fluticasone (FLONASE) 50 mcg/act nasal spray 1 spray into each nostril daily 9.9 mL 0    ibuprofen (MOTRIN) 800 mg tablet Take 1 tablet (800 mg total) by mouth every 8 (eight) hours as needed for mild pain 21 tablet 0    Iron-Vitamin C (Vitron-C)  MG TABS Take 1 tablet by mouth 2 (two) times a day 60 tablet 2    Magnesium Gluconate 550 MG TABS Take 400 mg by mouth 2 (two) times a day      pimecrolimus (ELIDEL) 1 % cream apply A thin layer to rash ON face twice a day if needed      rimegepant sulfate (Nurtec) 75 mg TBDP Take 1 tablet (75 mg total) by mouth as needed (migraine) Limit of 1 in 24 hours 8 tablet 5    VITAMIN D PO Take by mouth       No current facility-administered medications on file prior to visit.      Social History     Tobacco Use    " "Smoking status: Some Days     Current packs/day: 0.25     Average packs/day: 0.3 packs/day for 3.3 years (0.8 ttl pk-yrs)     Types: Cigarettes     Start date: 2022     Passive exposure: Past    Smokeless tobacco: Never   Vaping Use    Vaping status: Never Used   Substance and Sexual Activity    Alcohol use: Yes     Alcohol/week: 1.0 standard drink of alcohol     Types: 1 Glasses of wine per week     Comment: socially weekly    Drug use: Never    Sexual activity: Yes     Partners: Male     Birth control/protection: I.U.D.     Comment: history STD-chlamydia        Objective   /72 (BP Location: Right arm, Patient Position: Sitting, Cuff Size: Standard)   Pulse 80   Temp 97.8 °F (36.6 °C) (Temporal)   Ht 5' 5\" (1.651 m)   Wt 60.1 kg (132 lb 6.4 oz)   SpO2 99%   BMI 22.03 kg/m²     Physical Exam  Vitals reviewed.   Constitutional:       General: She is not in acute distress.  HENT:      Head: Normocephalic and atraumatic.      Right Ear: External ear normal.      Left Ear: External ear normal.      Nose: Nose normal.      Mouth/Throat:      Mouth: Mucous membranes are moist.      Pharynx: Oropharynx is clear.   Eyes:      General:         Right eye: No discharge.         Left eye: No discharge.      Extraocular Movements: Extraocular movements intact.      Conjunctiva/sclera: Conjunctivae normal.      Pupils: Pupils are equal, round, and reactive to light.   Cardiovascular:      Rate and Rhythm: Normal rate and regular rhythm.      Pulses: Normal pulses.      Heart sounds: Normal heart sounds.   Pulmonary:      Effort: Pulmonary effort is normal.      Breath sounds: Normal breath sounds.   Abdominal:      General: There is no distension.      Tenderness: There is no abdominal tenderness.   Musculoskeletal:      Cervical back: Normal range of motion.      Right lower leg: No edema.      Left lower leg: No edema.   Skin:     General: Skin is warm.      Capillary Refill: Capillary refill takes less than 2 " seconds.      Findings: No rash.   Neurological:      General: No focal deficit present.      Mental Status: She is alert. Mental status is at baseline.      Cranial Nerves: No cranial nerve deficit.      Sensory: No sensory deficit.      Motor: No weakness.      Coordination: Coordination normal.      Gait: Gait normal.      Deep Tendon Reflexes: Reflexes normal.   Psychiatric:         Mood and Affect: Mood normal.         Behavior: Behavior normal.       Neurological Exam  Mental Status  Alert.    Cranial Nerves  CN III, IV, VI: Extraocular movements intact bilaterally. Pupils equal round and reactive to light bilaterally.    Gait   Normal gait.

## 2025-04-09 ENCOUNTER — TELEPHONE (OUTPATIENT)
Dept: NEUROLOGY | Facility: CLINIC | Age: 30
End: 2025-04-09

## 2025-06-06 DIAGNOSIS — R76.8 THYROID ANTIBODY POSITIVE: Primary | ICD-10-CM
